# Patient Record
Sex: FEMALE | Race: WHITE | NOT HISPANIC OR LATINO
[De-identification: names, ages, dates, MRNs, and addresses within clinical notes are randomized per-mention and may not be internally consistent; named-entity substitution may affect disease eponyms.]

---

## 2017-01-20 ENCOUNTER — APPOINTMENT (OUTPATIENT)
Dept: OBGYN | Facility: CLINIC | Age: 67
End: 2017-01-20

## 2018-02-15 ENCOUNTER — APPOINTMENT (OUTPATIENT)
Dept: OBGYN | Facility: CLINIC | Age: 68
End: 2018-02-15
Payer: MEDICARE

## 2018-02-15 VITALS
WEIGHT: 158 LBS | DIASTOLIC BLOOD PRESSURE: 93 MMHG | HEART RATE: 94 BPM | SYSTOLIC BLOOD PRESSURE: 164 MMHG | HEIGHT: 64 IN | BODY MASS INDEX: 26.98 KG/M2

## 2018-02-15 DIAGNOSIS — Z01.419 ENCOUNTER FOR GYNECOLOGICAL EXAMINATION (GENERAL) (ROUTINE) W/OUT ABNORMAL FINDINGS: ICD-10-CM

## 2018-02-15 DIAGNOSIS — H91.93 UNSPECIFIED HEARING LOSS, BILATERAL: ICD-10-CM

## 2018-02-15 DIAGNOSIS — Z87.19 PERSONAL HISTORY OF OTHER DISEASES OF THE DIGESTIVE SYSTEM: ICD-10-CM

## 2018-02-15 DIAGNOSIS — Z80.0 FAMILY HISTORY OF MALIGNANT NEOPLASM OF DIGESTIVE ORGANS: ICD-10-CM

## 2018-02-15 DIAGNOSIS — Z87.39 PERSONAL HISTORY OF OTHER DISEASES OF THE MUSCULOSKELETAL SYSTEM AND CONNECTIVE TISSUE: ICD-10-CM

## 2018-02-15 DIAGNOSIS — F41.8 OTHER SPECIFIED ANXIETY DISORDERS: ICD-10-CM

## 2018-02-15 DIAGNOSIS — Z80.3 FAMILY HISTORY OF MALIGNANT NEOPLASM OF BREAST: ICD-10-CM

## 2018-02-15 DIAGNOSIS — F11.90 OPIOID USE, UNSPECIFIED, UNCOMPLICATED: ICD-10-CM

## 2018-02-15 DIAGNOSIS — Z86.79 PERSONAL HISTORY OF OTHER DISEASES OF THE CIRCULATORY SYSTEM: ICD-10-CM

## 2018-02-15 DIAGNOSIS — Z85.3 PERSONAL HISTORY OF MALIGNANT NEOPLASM OF BREAST: ICD-10-CM

## 2018-02-15 DIAGNOSIS — Z87.09 PERSONAL HISTORY OF OTHER DISEASES OF THE RESPIRATORY SYSTEM: ICD-10-CM

## 2018-02-15 PROCEDURE — G0101: CPT

## 2018-02-15 PROCEDURE — 99213 OFFICE O/P EST LOW 20 MIN: CPT | Mod: 25

## 2018-02-20 LAB — CORE LAB FLUID CYTOLOGY: NORMAL

## 2018-02-22 LAB — CYTOLOGY CVX/VAG DOC THIN PREP: NORMAL

## 2018-03-12 ENCOUNTER — APPOINTMENT (OUTPATIENT)
Dept: SURGERY | Facility: CLINIC | Age: 68
End: 2018-03-12
Payer: MEDICARE

## 2018-03-12 VITALS
SYSTOLIC BLOOD PRESSURE: 189 MMHG | HEART RATE: 70 BPM | BODY MASS INDEX: 27.31 KG/M2 | DIASTOLIC BLOOD PRESSURE: 79 MMHG | HEIGHT: 64 IN | TEMPERATURE: 98 F | WEIGHT: 160 LBS | OXYGEN SATURATION: 96 %

## 2018-03-12 DIAGNOSIS — Z80.3 FAMILY HISTORY OF MALIGNANT NEOPLASM OF BREAST: ICD-10-CM

## 2018-03-12 DIAGNOSIS — Z87.898 PERSONAL HISTORY OF OTHER SPECIFIED CONDITIONS: ICD-10-CM

## 2018-03-12 PROCEDURE — 99204 OFFICE O/P NEW MOD 45 MIN: CPT

## 2018-03-12 RX ORDER — OMEPRAZOLE MAGNESIUM 20.6 MG/1
20.6 (20 BASE) CAPSULE, DELAYED RELEASE ORAL
Refills: 0 | Status: ACTIVE | COMMUNITY

## 2018-03-12 RX ORDER — OXYCODONE AND ACETAMINOPHEN 10; 325 MG/1; MG/1
10-325 TABLET ORAL
Refills: 0 | Status: ACTIVE | COMMUNITY

## 2018-03-15 ENCOUNTER — OUTPATIENT (OUTPATIENT)
Dept: OUTPATIENT SERVICES | Facility: HOSPITAL | Age: 68
LOS: 1 days | End: 2018-03-15
Payer: MEDICARE

## 2018-03-15 ENCOUNTER — APPOINTMENT (OUTPATIENT)
Dept: ULTRASOUND IMAGING | Facility: CLINIC | Age: 68
End: 2018-03-15
Payer: MEDICARE

## 2018-03-15 ENCOUNTER — APPOINTMENT (OUTPATIENT)
Dept: MAMMOGRAPHY | Facility: CLINIC | Age: 68
End: 2018-03-15
Payer: MEDICARE

## 2018-03-15 DIAGNOSIS — Z00.8 ENCOUNTER FOR OTHER GENERAL EXAMINATION: ICD-10-CM

## 2018-03-15 PROCEDURE — 76641 ULTRASOUND BREAST COMPLETE: CPT | Mod: 26,50

## 2018-03-15 PROCEDURE — G0279: CPT

## 2018-03-15 PROCEDURE — 76641 ULTRASOUND BREAST COMPLETE: CPT

## 2018-03-15 PROCEDURE — G0279: CPT | Mod: 26

## 2018-03-15 PROCEDURE — 77066 DX MAMMO INCL CAD BI: CPT

## 2018-03-15 PROCEDURE — 77066 DX MAMMO INCL CAD BI: CPT | Mod: 26

## 2018-03-20 ENCOUNTER — APPOINTMENT (OUTPATIENT)
Dept: MRI IMAGING | Facility: CLINIC | Age: 68
End: 2018-03-20
Payer: MEDICARE

## 2018-03-20 ENCOUNTER — OUTPATIENT (OUTPATIENT)
Dept: OUTPATIENT SERVICES | Facility: HOSPITAL | Age: 68
LOS: 1 days | End: 2018-03-20
Payer: MEDICARE

## 2018-03-20 ENCOUNTER — APPOINTMENT (OUTPATIENT)
Dept: ULTRASOUND IMAGING | Facility: CLINIC | Age: 68
End: 2018-03-20
Payer: MEDICARE

## 2018-03-20 ENCOUNTER — RESULT REVIEW (OUTPATIENT)
Age: 68
End: 2018-03-20

## 2018-03-20 DIAGNOSIS — N64.52 NIPPLE DISCHARGE: ICD-10-CM

## 2018-03-20 DIAGNOSIS — Z00.8 ENCOUNTER FOR OTHER GENERAL EXAMINATION: ICD-10-CM

## 2018-03-20 PROCEDURE — 77065 DX MAMMO INCL CAD UNI: CPT | Mod: 26,LT

## 2018-03-20 PROCEDURE — 19083 BX BREAST 1ST LESION US IMAG: CPT | Mod: LT

## 2018-03-20 PROCEDURE — 77065 DX MAMMO INCL CAD UNI: CPT

## 2018-03-20 PROCEDURE — 19083 BX BREAST 1ST LESION US IMAG: CPT

## 2018-03-20 PROCEDURE — A4648: CPT

## 2018-03-23 ENCOUNTER — APPOINTMENT (OUTPATIENT)
Dept: SURGERY | Facility: CLINIC | Age: 68
End: 2018-03-23
Payer: MEDICARE

## 2018-03-23 VITALS — BODY MASS INDEX: 27.31 KG/M2 | HEIGHT: 64 IN | TEMPERATURE: 98 F | WEIGHT: 160 LBS

## 2018-03-23 VITALS — SYSTOLIC BLOOD PRESSURE: 121 MMHG | DIASTOLIC BLOOD PRESSURE: 75 MMHG | OXYGEN SATURATION: 93 % | HEART RATE: 75 BPM

## 2018-03-23 DIAGNOSIS — N64.52 NIPPLE DISCHARGE: ICD-10-CM

## 2018-03-23 DIAGNOSIS — G47.9 SLEEP DISORDER, UNSPECIFIED: ICD-10-CM

## 2018-03-23 PROCEDURE — 99215 OFFICE O/P EST HI 40 MIN: CPT

## 2018-03-23 RX ORDER — ZOLPIDEM TARTRATE 10 MG/1
10 TABLET ORAL
Qty: 30 | Refills: 0 | Status: DISCONTINUED | COMMUNITY
Start: 2018-01-11 | End: 2018-03-23

## 2018-03-23 RX ORDER — ZOLPIDEM TARTRATE 10 MG/1
10 TABLET, FILM COATED ORAL
Refills: 0 | Status: DISCONTINUED | COMMUNITY
End: 2018-03-23

## 2018-03-26 ENCOUNTER — OTHER (OUTPATIENT)
Age: 68
End: 2018-03-26

## 2018-03-28 PROBLEM — N64.52 DISCHARGE FROM LEFT NIPPLE: Status: ACTIVE | Noted: 2018-03-12

## 2018-04-02 ENCOUNTER — APPOINTMENT (OUTPATIENT)
Dept: SURGERY | Facility: CLINIC | Age: 68
End: 2018-04-02

## 2018-04-07 ENCOUNTER — OUTPATIENT (OUTPATIENT)
Dept: OUTPATIENT SERVICES | Facility: HOSPITAL | Age: 68
LOS: 1 days | Discharge: HOME | End: 2018-04-07

## 2018-04-07 DIAGNOSIS — C50.919 MALIGNANT NEOPLASM OF UNSPECIFIED SITE OF UNSPECIFIED FEMALE BREAST: ICD-10-CM

## 2018-04-11 ENCOUNTER — OUTPATIENT (OUTPATIENT)
Dept: OUTPATIENT SERVICES | Facility: HOSPITAL | Age: 68
LOS: 1 days | Discharge: HOME | End: 2018-04-11

## 2018-04-11 ENCOUNTER — APPOINTMENT (OUTPATIENT)
Dept: PLASTIC SURGERY | Facility: CLINIC | Age: 68
End: 2018-04-11

## 2018-04-11 DIAGNOSIS — C50.919 MALIGNANT NEOPLASM OF UNSPECIFIED SITE OF UNSPECIFIED FEMALE BREAST: ICD-10-CM

## 2018-04-16 ENCOUNTER — APPOINTMENT (OUTPATIENT)
Dept: HEMATOLOGY ONCOLOGY | Facility: CLINIC | Age: 68
End: 2018-04-16
Payer: MEDICARE

## 2018-04-16 ENCOUNTER — APPOINTMENT (OUTPATIENT)
Dept: SURGERY | Facility: CLINIC | Age: 68
End: 2018-04-16
Payer: MEDICARE

## 2018-04-16 VITALS
HEART RATE: 79 BPM | OXYGEN SATURATION: 94 % | BODY MASS INDEX: 28.44 KG/M2 | HEIGHT: 64 IN | DIASTOLIC BLOOD PRESSURE: 83 MMHG | SYSTOLIC BLOOD PRESSURE: 181 MMHG | WEIGHT: 166.56 LBS | TEMPERATURE: 97.6 F

## 2018-04-16 VITALS
HEART RATE: 65 BPM | DIASTOLIC BLOOD PRESSURE: 87 MMHG | WEIGHT: 166 LBS | BODY MASS INDEX: 28.34 KG/M2 | TEMPERATURE: 98.5 F | SYSTOLIC BLOOD PRESSURE: 143 MMHG | OXYGEN SATURATION: 96 % | HEIGHT: 64 IN

## 2018-04-16 DIAGNOSIS — N64.52 NIPPLE DISCHARGE: ICD-10-CM

## 2018-04-16 DIAGNOSIS — J44.9 CHRONIC OBSTRUCTIVE PULMONARY DISEASE, UNSPECIFIED: ICD-10-CM

## 2018-04-16 DIAGNOSIS — R92.8 OTHER ABNORMAL AND INCONCLUSIVE FINDINGS ON DIAGNOSTIC IMAGING OF BREAST: ICD-10-CM

## 2018-04-16 PROCEDURE — 99215 OFFICE O/P EST HI 40 MIN: CPT

## 2018-04-16 PROCEDURE — 99205 OFFICE O/P NEW HI 60 MIN: CPT

## 2018-04-16 RX ORDER — VALSARTAN AND HYDROCHLOROTHIAZIDE 160; 12.5 MG/1; MG/1
160-12.5 TABLET, FILM COATED ORAL
Refills: 0 | Status: DISCONTINUED | COMMUNITY
End: 2018-04-16

## 2018-04-18 ENCOUNTER — APPOINTMENT (OUTPATIENT)
Dept: MRI IMAGING | Facility: CLINIC | Age: 68
End: 2018-04-18
Payer: MEDICARE

## 2018-04-18 ENCOUNTER — OUTPATIENT (OUTPATIENT)
Dept: OUTPATIENT SERVICES | Facility: HOSPITAL | Age: 68
LOS: 1 days | End: 2018-04-18
Payer: MEDICARE

## 2018-04-18 DIAGNOSIS — N64.52 NIPPLE DISCHARGE: ICD-10-CM

## 2018-04-18 PROCEDURE — 77059 MRI BREAST BILATERAL: CPT | Mod: 26

## 2018-04-18 PROCEDURE — C8937: CPT

## 2018-04-18 PROCEDURE — C8908: CPT

## 2018-04-18 PROCEDURE — A9585: CPT

## 2018-04-18 PROCEDURE — 0159T: CPT | Mod: 26

## 2018-04-18 PROCEDURE — 82565 ASSAY OF CREATININE: CPT

## 2018-04-23 ENCOUNTER — APPOINTMENT (OUTPATIENT)
Dept: PHYSICAL MEDICINE AND REHAB | Facility: CLINIC | Age: 68
End: 2018-04-23
Payer: MEDICARE

## 2018-04-23 VITALS
TEMPERATURE: 97.6 F | HEART RATE: 68 BPM | BODY MASS INDEX: 28.4 KG/M2 | HEIGHT: 64 IN | WEIGHT: 166.34 LBS | SYSTOLIC BLOOD PRESSURE: 150 MMHG | DIASTOLIC BLOOD PRESSURE: 96 MMHG | OXYGEN SATURATION: 96 %

## 2018-04-23 PROCEDURE — 99204 OFFICE O/P NEW MOD 45 MIN: CPT

## 2018-05-02 PROBLEM — R92.8 ABNORMAL FINDING ON BREAST IMAGING: Status: ACTIVE | Noted: 2018-03-16

## 2018-05-08 ENCOUNTER — APPOINTMENT (OUTPATIENT)
Dept: PLASTIC SURGERY | Facility: CLINIC | Age: 68
End: 2018-05-08
Payer: MEDICARE

## 2018-05-08 VITALS
BODY MASS INDEX: 28.17 KG/M2 | DIASTOLIC BLOOD PRESSURE: 90 MMHG | HEIGHT: 64 IN | OXYGEN SATURATION: 99 % | WEIGHT: 165 LBS | TEMPERATURE: 98.2 F | RESPIRATION RATE: 16 BRPM | SYSTOLIC BLOOD PRESSURE: 148 MMHG | HEART RATE: 72 BPM

## 2018-05-08 DIAGNOSIS — Z80.0 FAMILY HISTORY OF MALIGNANT NEOPLASM OF DIGESTIVE ORGANS: ICD-10-CM

## 2018-05-08 DIAGNOSIS — Z80.41 FAMILY HISTORY OF MALIGNANT NEOPLASM OF OVARY: ICD-10-CM

## 2018-05-08 DIAGNOSIS — Z80.3 FAMILY HISTORY OF MALIGNANT NEOPLASM OF BREAST: ICD-10-CM

## 2018-05-08 PROCEDURE — 99205 OFFICE O/P NEW HI 60 MIN: CPT

## 2018-05-10 ENCOUNTER — OTHER (OUTPATIENT)
Age: 68
End: 2018-05-10

## 2018-05-15 ENCOUNTER — OUTPATIENT (OUTPATIENT)
Dept: OUTPATIENT SERVICES | Facility: HOSPITAL | Age: 68
LOS: 1 days | End: 2018-05-15
Payer: MEDICARE

## 2018-05-15 ENCOUNTER — APPOINTMENT (OUTPATIENT)
Dept: MRI IMAGING | Facility: CLINIC | Age: 68
End: 2018-05-15
Payer: MEDICARE

## 2018-05-15 ENCOUNTER — RESULT REVIEW (OUTPATIENT)
Age: 68
End: 2018-05-15

## 2018-05-15 DIAGNOSIS — Z00.8 ENCOUNTER FOR OTHER GENERAL EXAMINATION: ICD-10-CM

## 2018-05-15 PROCEDURE — A4648: CPT

## 2018-05-15 PROCEDURE — 77065 DX MAMMO INCL CAD UNI: CPT

## 2018-05-15 PROCEDURE — A9585: CPT

## 2018-05-15 PROCEDURE — 19085 BX BREAST 1ST LESION MR IMAG: CPT | Mod: RT

## 2018-05-15 PROCEDURE — 77065 DX MAMMO INCL CAD UNI: CPT | Mod: 26,RT

## 2018-05-15 PROCEDURE — 19085 BX BREAST 1ST LESION MR IMAG: CPT

## 2018-05-17 ENCOUNTER — APPOINTMENT (OUTPATIENT)
Dept: SURGERY | Facility: CLINIC | Age: 68
End: 2018-05-17
Payer: MEDICARE

## 2018-05-17 VITALS
OXYGEN SATURATION: 97 % | BODY MASS INDEX: 28.34 KG/M2 | SYSTOLIC BLOOD PRESSURE: 156 MMHG | HEIGHT: 64 IN | DIASTOLIC BLOOD PRESSURE: 93 MMHG | WEIGHT: 166 LBS | HEART RATE: 70 BPM

## 2018-05-17 VITALS — TEMPERATURE: 99.2 F

## 2018-05-17 PROCEDURE — 99215 OFFICE O/P EST HI 40 MIN: CPT

## 2018-05-17 RX ORDER — AMLODIPINE BESYLATE 5 MG/1
5 TABLET ORAL DAILY
Refills: 0 | Status: DISCONTINUED | COMMUNITY
Start: 2018-04-16 | End: 2018-05-17

## 2018-05-21 ENCOUNTER — APPOINTMENT (OUTPATIENT)
Dept: NEPHROLOGY | Facility: CLINIC | Age: 68
End: 2018-05-21

## 2018-06-07 ENCOUNTER — OUTPATIENT (OUTPATIENT)
Dept: OUTPATIENT SERVICES | Facility: HOSPITAL | Age: 68
LOS: 1 days | End: 2018-06-07
Payer: MEDICARE

## 2018-06-07 ENCOUNTER — APPOINTMENT (OUTPATIENT)
Dept: MRI IMAGING | Facility: IMAGING CENTER | Age: 68
End: 2018-06-07
Payer: MEDICARE

## 2018-06-07 DIAGNOSIS — C50.912 MALIGNANT NEOPLASM OF UNSPECIFIED SITE OF LEFT FEMALE BREAST: ICD-10-CM

## 2018-06-07 PROCEDURE — 82565 ASSAY OF CREATININE: CPT

## 2018-06-07 PROCEDURE — C8902: CPT

## 2018-06-07 PROCEDURE — C8920: CPT

## 2018-06-07 PROCEDURE — 74185 MRA ABD W OR W/O CNTRST: CPT | Mod: 26

## 2018-06-07 PROCEDURE — A9585: CPT

## 2018-06-07 PROCEDURE — 72198 MR ANGIO PELVIS W/O & W/DYE: CPT | Mod: 26

## 2018-06-11 ENCOUNTER — OUTPATIENT (OUTPATIENT)
Dept: OUTPATIENT SERVICES | Facility: HOSPITAL | Age: 68
LOS: 1 days | End: 2018-06-11
Payer: MEDICARE

## 2018-06-11 ENCOUNTER — APPOINTMENT (OUTPATIENT)
Dept: SURGERY | Facility: CLINIC | Age: 68
End: 2018-06-11
Payer: MEDICARE

## 2018-06-11 VITALS
DIASTOLIC BLOOD PRESSURE: 56 MMHG | WEIGHT: 155.21 LBS | HEIGHT: 64 IN | HEART RATE: 61 BPM | RESPIRATION RATE: 16 BRPM | TEMPERATURE: 97 F | SYSTOLIC BLOOD PRESSURE: 81 MMHG

## 2018-06-11 VITALS
OXYGEN SATURATION: 97 % | WEIGHT: 153.88 LBS | BODY MASS INDEX: 26.27 KG/M2 | HEIGHT: 64 IN | HEART RATE: 67 BPM | TEMPERATURE: 97.9 F

## 2018-06-11 DIAGNOSIS — Z29.9 ENCOUNTER FOR PROPHYLACTIC MEASURES, UNSPECIFIED: ICD-10-CM

## 2018-06-11 DIAGNOSIS — Z01.818 ENCOUNTER FOR OTHER PREPROCEDURAL EXAMINATION: ICD-10-CM

## 2018-06-11 DIAGNOSIS — Z15.01 GENETIC SUSCEPTIBILITY TO MALIGNANT NEOPLASM OF BREAST: ICD-10-CM

## 2018-06-11 DIAGNOSIS — Z98.890 OTHER SPECIFIED POSTPROCEDURAL STATES: Chronic | ICD-10-CM

## 2018-06-11 DIAGNOSIS — M54.9 DORSALGIA, UNSPECIFIED: ICD-10-CM

## 2018-06-11 DIAGNOSIS — J44.9 CHRONIC OBSTRUCTIVE PULMONARY DISEASE, UNSPECIFIED: ICD-10-CM

## 2018-06-11 DIAGNOSIS — C50.912 MALIGNANT NEOPLASM OF UNSPECIFIED SITE OF LEFT FEMALE BREAST: ICD-10-CM

## 2018-06-11 DIAGNOSIS — I10 ESSENTIAL (PRIMARY) HYPERTENSION: ICD-10-CM

## 2018-06-11 DIAGNOSIS — J38.1 POLYP OF VOCAL CORD AND LARYNX: Chronic | ICD-10-CM

## 2018-06-11 LAB
ALBUMIN SERPL ELPH-MCNC: 4.1 G/DL — SIGNIFICANT CHANGE UP (ref 3.3–5.2)
ALP SERPL-CCNC: 90 U/L — SIGNIFICANT CHANGE UP (ref 40–120)
ALT FLD-CCNC: 19 U/L — SIGNIFICANT CHANGE UP
ANION GAP SERPL CALC-SCNC: 16 MMOL/L — SIGNIFICANT CHANGE UP (ref 5–17)
APTT BLD: 30.7 SEC — SIGNIFICANT CHANGE UP (ref 27.5–37.4)
AST SERPL-CCNC: 22 U/L — SIGNIFICANT CHANGE UP
BASOPHILS # BLD AUTO: 0 K/UL — SIGNIFICANT CHANGE UP (ref 0–0.2)
BASOPHILS NFR BLD AUTO: 0.4 % — SIGNIFICANT CHANGE UP (ref 0–2)
BILIRUB SERPL-MCNC: 0.2 MG/DL — LOW (ref 0.4–2)
BLD GP AB SCN SERPL QL: SIGNIFICANT CHANGE UP
BUN SERPL-MCNC: 26 MG/DL — HIGH (ref 8–20)
CALCIUM SERPL-MCNC: 9.3 MG/DL — SIGNIFICANT CHANGE UP (ref 8.6–10.2)
CALCIUM SERPL-MCNC: 9.5 MG/DL — SIGNIFICANT CHANGE UP (ref 8.6–10.2)
CHLORIDE SERPL-SCNC: 97 MMOL/L — LOW (ref 98–107)
CO2 SERPL-SCNC: 26 MMOL/L — SIGNIFICANT CHANGE UP (ref 22–29)
CREAT SERPL-MCNC: 1.43 MG/DL — HIGH (ref 0.5–1.3)
EOSINOPHIL # BLD AUTO: 0 K/UL — SIGNIFICANT CHANGE UP (ref 0–0.5)
EOSINOPHIL NFR BLD AUTO: 0.9 % — SIGNIFICANT CHANGE UP (ref 0–6)
GLUCOSE SERPL-MCNC: 111 MG/DL — SIGNIFICANT CHANGE UP (ref 70–115)
HBA1C BLD-MCNC: 5.4 % — SIGNIFICANT CHANGE UP (ref 4–5.6)
HCT VFR BLD CALC: 39.6 % — SIGNIFICANT CHANGE UP (ref 37–47)
HGB BLD-MCNC: 13.3 G/DL — SIGNIFICANT CHANGE UP (ref 12–16)
INR BLD: 1.11 RATIO — SIGNIFICANT CHANGE UP (ref 0.88–1.16)
LYMPHOCYTES # BLD AUTO: 1.5 K/UL — SIGNIFICANT CHANGE UP (ref 1–4.8)
LYMPHOCYTES # BLD AUTO: 27.6 % — SIGNIFICANT CHANGE UP (ref 20–55)
MAGNESIUM SERPL-MCNC: 1.8 MG/DL — SIGNIFICANT CHANGE UP (ref 1.6–2.6)
MCHC RBC-ENTMCNC: 28.5 PG — SIGNIFICANT CHANGE UP (ref 27–31)
MCHC RBC-ENTMCNC: 33.6 G/DL — SIGNIFICANT CHANGE UP (ref 32–36)
MCV RBC AUTO: 85 FL — SIGNIFICANT CHANGE UP (ref 81–99)
MONOCYTES # BLD AUTO: 0.3 K/UL — SIGNIFICANT CHANGE UP (ref 0–0.8)
MONOCYTES NFR BLD AUTO: 5.4 % — SIGNIFICANT CHANGE UP (ref 3–10)
NEUTROPHILS # BLD AUTO: 3.5 K/UL — SIGNIFICANT CHANGE UP (ref 1.8–8)
NEUTROPHILS NFR BLD AUTO: 65.5 % — SIGNIFICANT CHANGE UP (ref 37–73)
PHOSPHATE SERPL-MCNC: 3.7 MG/DL — SIGNIFICANT CHANGE UP (ref 2.4–4.7)
PLATELET # BLD AUTO: 290 K/UL — SIGNIFICANT CHANGE UP (ref 150–400)
POTASSIUM SERPL-MCNC: 3 MMOL/L — LOW (ref 3.5–5.3)
POTASSIUM SERPL-SCNC: 3 MMOL/L — LOW (ref 3.5–5.3)
PROT SERPL-MCNC: 7.2 G/DL — SIGNIFICANT CHANGE UP (ref 6.6–8.7)
PROTHROM AB SERPL-ACNC: 12.2 SEC — SIGNIFICANT CHANGE UP (ref 9.8–12.7)
RBC # BLD: 4.66 M/UL — SIGNIFICANT CHANGE UP (ref 4.4–5.2)
RBC # FLD: 13.7 % — SIGNIFICANT CHANGE UP (ref 11–15.6)
SODIUM SERPL-SCNC: 139 MMOL/L — SIGNIFICANT CHANGE UP (ref 135–145)
TYPE + AB SCN PNL BLD: SIGNIFICANT CHANGE UP
WBC # BLD: 5.4 K/UL — SIGNIFICANT CHANGE UP (ref 4.8–10.8)
WBC # FLD AUTO: 5.4 K/UL — SIGNIFICANT CHANGE UP (ref 4.8–10.8)

## 2018-06-11 PROCEDURE — 83036 HEMOGLOBIN GLYCOSYLATED A1C: CPT

## 2018-06-11 PROCEDURE — 85027 COMPLETE CBC AUTOMATED: CPT

## 2018-06-11 PROCEDURE — 85730 THROMBOPLASTIN TIME PARTIAL: CPT

## 2018-06-11 PROCEDURE — 99215 OFFICE O/P EST HI 40 MIN: CPT

## 2018-06-11 PROCEDURE — 86900 BLOOD TYPING SEROLOGIC ABO: CPT

## 2018-06-11 PROCEDURE — 36415 COLL VENOUS BLD VENIPUNCTURE: CPT

## 2018-06-11 PROCEDURE — 86304 IMMUNOASSAY TUMOR CA 125: CPT

## 2018-06-11 PROCEDURE — 86850 RBC ANTIBODY SCREEN: CPT

## 2018-06-11 PROCEDURE — G0463: CPT

## 2018-06-11 PROCEDURE — 82310 ASSAY OF CALCIUM: CPT

## 2018-06-11 PROCEDURE — 85610 PROTHROMBIN TIME: CPT

## 2018-06-11 PROCEDURE — 86901 BLOOD TYPING SEROLOGIC RH(D): CPT

## 2018-06-11 PROCEDURE — 71046 X-RAY EXAM CHEST 2 VIEWS: CPT | Mod: 26

## 2018-06-11 PROCEDURE — 80053 COMPREHEN METABOLIC PANEL: CPT

## 2018-06-11 PROCEDURE — 83735 ASSAY OF MAGNESIUM: CPT

## 2018-06-11 PROCEDURE — 84100 ASSAY OF PHOSPHORUS: CPT

## 2018-06-11 PROCEDURE — 71046 X-RAY EXAM CHEST 2 VIEWS: CPT

## 2018-06-11 PROCEDURE — 93010 ELECTROCARDIOGRAM REPORT: CPT

## 2018-06-11 PROCEDURE — 93005 ELECTROCARDIOGRAM TRACING: CPT

## 2018-06-11 RX ORDER — SODIUM CHLORIDE 9 MG/ML
3 INJECTION INTRAMUSCULAR; INTRAVENOUS; SUBCUTANEOUS EVERY 8 HOURS
Qty: 0 | Refills: 0 | Status: DISCONTINUED | OUTPATIENT
Start: 2018-06-18 | End: 2018-06-18

## 2018-06-11 RX ORDER — CEFOTETAN DISODIUM 1 G
2 VIAL (EA) INJECTION ONCE
Qty: 0 | Refills: 0 | Status: COMPLETED | OUTPATIENT
Start: 2018-06-18 | End: 2018-06-18

## 2018-06-11 NOTE — PATIENT PROFILE ADULT. - PRO PAIN LIFE ADAPT
inability or reluctance to perform ADLs/inability to sleep/inability to concentrate/decreased activity level/decreased appetite

## 2018-06-11 NOTE — H&P PST ADULT - ASSESSMENT
medications reviewed, instructions given on what medications to take and what not to take. Asked the patient to take the Blood pressure medication/ heart medication on DOP.   CAPRINI SCORE [CLOT]    AGE RELATED RISK FACTORS                                                       MOBILITY RELATED FACTORS  [ ] Age 41-60 years                                            (1 Point)                  [ ] Bed rest                                                        (1 Point)  [ ] Age: 61-74 years                                           (2 Points)                 [ ] Plaster cast                                                   (2 Points)  [ ] Age= 75 years                                              (3 Points)                 [ ] Bed bound for more than 72 hours                 (2 Points)    DISEASE RELATED RISK FACTORS                                               GENDER SPECIFIC FACTORS  [ ] Edema in the lower extremities                       (1 Point)                  [ ] Pregnancy                                                     (1 Point)  [ ] Varicose veins                                               (1 Point)                  [ ] Post-partum < 6 weeks                                   (1 Point)             [ ] BMI > 25 Kg/m2                                            (1 Point)                  [ ] Hormonal therapy  or oral contraception          (1 Point)                 [ ] Sepsis (in the previous month)                        (1 Point)                  [ ] History of pregnancy complications                 (1 point)  [ ] Pneumonia or serious lung disease                                               [ ] Unexplained or recurrent                     (1 Point)           (in the previous month)                               (1 Point)  [ ] Abnormal pulmonary function test                     (1 Point)                 SURGERY RELATED RISK FACTORS  [ ] Acute myocardial infarction                              (1 Point)                 [ ]  Section                                             (1 Point)  [ ] Congestive heart failure (in the previous month)  (1 Point)               [ ] Minor surgery                                                  (1 Point)   [ ] Inflammatory bowel disease                             (1 Point)                 [ ] Arthroscopic surgery                                        (2 Points)  [ ] Central venous access                                      (2 Points)                [ ] General surgery lasting more than 45 minutes   (2 Points)       [ ] Stroke (in the previous month)                          (5 Points)               [ ] Elective arthroplasty                                         (5 Points)                                                                                                                                               HEMATOLOGY RELATED FACTORS                                                 TRAUMA RELATED RISK FACTORS  [ ] Prior episodes of VTE                                     (3 Points)                 [ ] Fracture of the hip, pelvis, or leg                       (5 Points)  [ ] Positive family history for VTE                         (3 Points)                 [ ] Acute spinal cord injury (in the previous month)  (5 Points)  [ ] Prothrombin 12702 A                                     (3 Points)                 [ ] Paralysis  (less than 1 month)                             (5 Points)  [ ] Factor V Leiden                                             (3 Points)                  [ ] Multiple Trauma within 1 month                        (5 Points)  [ ] Lupus anticoagulants                                     (3 Points)                                                           [ ] Anticardiolipin antibodies                               (3 Points)                                                       [ ] High homocysteine in the blood                      (3 Points)                                             [ ] Other congenital or acquired thrombophilia      (3 Points)                                                [ ] Heparin induced thrombocytopenia                  (3 Points)                                          Total Score [          ] medications reviewed, instructions given on what medications to take and what not to take. Asked the patient to take the Blood pressure medication/ heart medication on DOP.   CAPRINI SCORE [CLOT]    AGE RELATED RISK FACTORS                                                       MOBILITY RELATED FACTORS  [ ] Age 41-60 years                                            (1 Point)                  [ ] Bed rest                                                        (1 Point)  [x ] Age: 61-74 years                                           (2 Points)                 [ ] Plaster cast                                                   (2 Points)  [ ] Age= 75 years                                              (3 Points)                 [ ] Bed bound for more than 72 hours                 (2 Points)    DISEASE RELATED RISK FACTORS                                               GENDER SPECIFIC FACTORS  [ ] Edema in the lower extremities                       (1 Point)                  [ ] Pregnancy                                                     (1 Point)  [ ] Varicose veins                                               (1 Point)                  [ ] Post-partum < 6 weeks                                   (1 Point)             [x ] BMI > 25 Kg/m2                                            (1 Point)                  [ ] Hormonal therapy  or oral contraception          (1 Point)                 [ ] Sepsis (in the previous month)                        (1 Point)                  [ ] History of pregnancy complications                 (1 point)  [ ] Pneumonia or serious lung disease                                               [ ] Unexplained or recurrent                     (1 Point)           (in the previous month)                               (1 Point)  [ ] Abnormal pulmonary function test                     (1 Point)                 SURGERY RELATED RISK FACTORS  [ ] Acute myocardial infarction                              (1 Point)                 [ ]  Section                                             (1 Point)  [ ] Congestive heart failure (in the previous month)  (1 Point)               [ ] Minor surgery                                                  (1 Point)   [ ] Inflammatory bowel disease                             (1 Point)                 [ ] Arthroscopic surgery                                        (2 Points)  [ ] Central venous access                                      (2 Points)                [x ] General surgery lasting more than 45 minutes   (2 Points)       [ ] Stroke (in the previous month)                          (5 Points)               [ ] Elective arthroplasty                                         (5 Points)                                                                                                                                               HEMATOLOGY RELATED FACTORS                                                 TRAUMA RELATED RISK FACTORS  [ ] Prior episodes of VTE                                     (3 Points)                 [ ] Fracture of the hip, pelvis, or leg                       (5 Points)  [ ] Positive family history for VTE                         (3 Points)                 [ ] Acute spinal cord injury (in the previous month)  (5 Points)  [ ] Prothrombin 43691 A                                     (3 Points)                 [ ] Paralysis  (less than 1 month)                             (5 Points)  [ ] Factor V Leiden                                             (3 Points)                  [ ] Multiple Trauma within 1 month                        (5 Points)  [ ] Lupus anticoagulants                                     (3 Points)                                                           [ ] Anticardiolipin antibodies                               (3 Points)                                                       [ ] High homocysteine in the blood                      (3 Points)                                             [ ] Other congenital or acquired thrombophilia      (3 Points)                                                [ ] Heparin induced thrombocytopenia                  (3 Points)                                          Total Score [ 5         ]

## 2018-06-11 NOTE — H&P PST ADULT - PMH
Back pain  Chronic  COPD (chronic obstructive pulmonary disease)    GERD (gastroesophageal reflux disease)    Hypertension

## 2018-06-11 NOTE — H&P PST ADULT - HISTORY OF PRESENT ILLNESS
67 year old female 67 year old female who had a history of breast cancer in 2005, S/P chemo and radiation states she started having drainage from her left breast in Feb 2017 and she had a mammo gram and sonogram which was negative and the drainage stopped later but it started again in Dec. 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 67 year old female who had a history of breast cancer in 2005, S/P chemo and radiation states she started having drainage from her left breast in Feb 2017 and she had a mammo gram and sonogram which was negative and the drainage stopped later but it started again in Dec. 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

## 2018-06-11 NOTE — PATIENT PROFILE ADULT. - ABILITY TO HEAR (WITH HEARING AID OR HEARING APPLIANCE IF NORMALLY USED):
bilat hearing loss/Mildly to Moderately Impaired: difficulty hearing in some environments or speaker may need to increase volume or speak distinctly

## 2018-06-11 NOTE — PATIENT PROFILE ADULT. - LEARNING ASSESSMENT (PATIENT) ADDITIONAL COMMENTS
Metoprolol, Valsartan_HCTZ, spiriva, symbicort, ventolin rescue, Med clearance - Dr Blackmon 6/12, Cardiac clear with  Dr Toth & Pulmonary clear with Dr Gatica Metoprolol, Valsartan_HCTZ, spiriva, symbicort, ventolin rescue, Med clearance - Dr Blackmon 6/12, Cardiac clear with  Dr Toth & Pulmonary clear with Dr Gatica H/O TMJ Instructed pt on pre-op instructions, tips for safer surgery, pain management scale, pre-surgical infection prevention instructions, take Metoprolol & Valsartan-HCTZ with a sip of water & Spiriva, Symbicort the morning of surgery, bring ventolin rescue inhaler, Med clearance  with  Dr Blackmon on 6/12/18, Cardiac clearance with Dr Toth & Pulmonary clearance with Dr Gatica verbalized understanding of all.

## 2018-06-11 NOTE — H&P PST ADULT - ATTENDING COMMENTS
Patient understands that left sentinel lymph node biopsy may fail depending on previous surgery/healing however attempt will be made for left sentinel lymph node biopsy possible axillary dissection.  She understands that secondary to bilateral breast cancer, she will undergo bilateral mastectomy with bilateral sentinel lymph node biopsy possible axillary dissection. Risks v benefits were discussed. Technical aspects of procedure were reviewed. Patient understands that left sentinel lymph node biopsy may fail depending on previous surgery/healing (records indicate that she underwent a sentinel lymph node biopsy in 2005) however attempt will be made for left sentinel lymph node biopsy possible axillary dissection.  She understands that secondary to bilateral breast cancer, she will undergo bilateral mastectomy with bilateral sentinel lymph node biopsy possible axillary dissection. Risks v benefits were discussed. Technical aspects of procedure were reviewed.

## 2018-06-11 NOTE — H&P PST ADULT - OTHER CARE PROVIDERS
will see Dr. Toth for cardiac clearance pending , will see Dr. Gatica for Pulmonary clearance pending

## 2018-06-11 NOTE — H&P PST ADULT - PROBLEM SELECTOR PLAN 1
Bilateral mastectomy bilateral lymphoscintigraphy, bilateral breast reconstruction with deep inferior epigastric  flaps. Medical, pulmonary and cardiac clearance pending

## 2018-06-11 NOTE — H&P PST ADULT - FAMILY HISTORY
Mother  Still living? No  Family history of breast cancer in female, Age at diagnosis: Age Unknown     Father  Still living? No  Family history of throat cancer, Age at diagnosis: Age Unknown

## 2018-06-12 LAB — CANCER AG125 SERPL-ACNC: 18 U/ML — SIGNIFICANT CHANGE UP

## 2018-06-17 ENCOUNTER — TRANSCRIPTION ENCOUNTER (OUTPATIENT)
Age: 68
End: 2018-06-17

## 2018-06-18 ENCOUNTER — APPOINTMENT (OUTPATIENT)
Dept: PLASTIC SURGERY | Facility: HOSPITAL | Age: 68
End: 2018-06-18
Payer: MEDICARE

## 2018-06-18 ENCOUNTER — RESULT REVIEW (OUTPATIENT)
Age: 68
End: 2018-06-18

## 2018-06-18 ENCOUNTER — APPOINTMENT (OUTPATIENT)
Dept: SURGERY | Facility: HOSPITAL | Age: 68
End: 2018-06-18

## 2018-06-18 ENCOUNTER — INPATIENT (INPATIENT)
Facility: HOSPITAL | Age: 68
LOS: 3 days | Discharge: HOME | DRG: 571 | End: 2018-06-22
Attending: SURGERY | Admitting: SURGERY
Payer: MEDICARE

## 2018-06-18 VITALS
HEIGHT: 64 IN | OXYGEN SATURATION: 99 % | HEART RATE: 78 BPM | RESPIRATION RATE: 16 BRPM | SYSTOLIC BLOOD PRESSURE: 122 MMHG | WEIGHT: 155.21 LBS | TEMPERATURE: 98 F | DIASTOLIC BLOOD PRESSURE: 66 MMHG

## 2018-06-18 DIAGNOSIS — J38.1 POLYP OF VOCAL CORD AND LARYNX: Chronic | ICD-10-CM

## 2018-06-18 DIAGNOSIS — Z98.890 OTHER SPECIFIED POSTPROCEDURAL STATES: Chronic | ICD-10-CM

## 2018-06-18 DIAGNOSIS — C50.912 MALIGNANT NEOPLASM OF UNSPECIFIED SITE OF LEFT FEMALE BREAST: ICD-10-CM

## 2018-06-18 LAB
ANION GAP SERPL CALC-SCNC: 15 MMOL/L — SIGNIFICANT CHANGE UP (ref 5–17)
BUN SERPL-MCNC: 10 MG/DL — SIGNIFICANT CHANGE UP (ref 8–20)
CALCIUM SERPL-MCNC: 7.8 MG/DL — LOW (ref 8.6–10.2)
CHLORIDE SERPL-SCNC: 99 MMOL/L — SIGNIFICANT CHANGE UP (ref 98–107)
CO2 SERPL-SCNC: 24 MMOL/L — SIGNIFICANT CHANGE UP (ref 22–29)
CREAT SERPL-MCNC: 1.01 MG/DL — SIGNIFICANT CHANGE UP (ref 0.5–1.3)
GLUCOSE BLDC GLUCOMTR-MCNC: 112 MG/DL — HIGH (ref 70–99)
GLUCOSE BLDC GLUCOMTR-MCNC: 123 MG/DL — HIGH (ref 70–99)
GLUCOSE BLDC GLUCOMTR-MCNC: 97 MG/DL — SIGNIFICANT CHANGE UP (ref 70–99)
GLUCOSE SERPL-MCNC: 127 MG/DL — HIGH (ref 70–115)
HCT VFR BLD CALC: 33.2 % — LOW (ref 37–47)
HGB BLD-MCNC: 11.2 G/DL — LOW (ref 12–16)
MAGNESIUM SERPL-MCNC: 1.8 MG/DL — SIGNIFICANT CHANGE UP (ref 1.6–2.6)
MCHC RBC-ENTMCNC: 29.2 PG — SIGNIFICANT CHANGE UP (ref 27–31)
MCHC RBC-ENTMCNC: 33.7 G/DL — SIGNIFICANT CHANGE UP (ref 32–36)
MCV RBC AUTO: 86.7 FL — SIGNIFICANT CHANGE UP (ref 81–99)
PHOSPHATE SERPL-MCNC: 4.1 MG/DL — SIGNIFICANT CHANGE UP (ref 2.4–4.7)
PLATELET # BLD AUTO: 198 K/UL — SIGNIFICANT CHANGE UP (ref 150–400)
POTASSIUM SERPL-MCNC: 4 MMOL/L — SIGNIFICANT CHANGE UP (ref 3.5–5.3)
POTASSIUM SERPL-MCNC: 4.5 MMOL/L — SIGNIFICANT CHANGE UP (ref 3.5–5.3)
POTASSIUM SERPL-SCNC: 4 MMOL/L — SIGNIFICANT CHANGE UP (ref 3.5–5.3)
POTASSIUM SERPL-SCNC: 4.5 MMOL/L — SIGNIFICANT CHANGE UP (ref 3.5–5.3)
RBC # BLD: 3.83 M/UL — LOW (ref 4.4–5.2)
RBC # FLD: 13.8 % — SIGNIFICANT CHANGE UP (ref 11–15.6)
SODIUM SERPL-SCNC: 138 MMOL/L — SIGNIFICANT CHANGE UP (ref 135–145)
WBC # BLD: 10.3 K/UL — SIGNIFICANT CHANGE UP (ref 4.8–10.8)
WBC # FLD AUTO: 10.3 K/UL — SIGNIFICANT CHANGE UP (ref 4.8–10.8)

## 2018-06-18 PROCEDURE — XXXXX: CPT

## 2018-06-18 PROCEDURE — 19364 BRST RCNSTJ FREE FLAP: CPT

## 2018-06-18 PROCEDURE — 38792 RA TRACER ID OF SENTINL NODE: CPT | Mod: 50

## 2018-06-18 PROCEDURE — 88309 TISSUE EXAM BY PATHOLOGIST: CPT | Mod: 26

## 2018-06-18 PROCEDURE — 88307 TISSUE EXAM BY PATHOLOGIST: CPT | Mod: 26

## 2018-06-18 PROCEDURE — 88333 PATH CONSLTJ SURG CYTO XM 1: CPT | Mod: 26

## 2018-06-18 PROCEDURE — 38530 BIOPSY/REMOVAL LYMPH NODES: CPT | Mod: RT

## 2018-06-18 PROCEDURE — 88305 TISSUE EXAM BY PATHOLOGIST: CPT | Mod: 26

## 2018-06-18 PROCEDURE — 19303 MAST SIMPLE COMPLETE: CPT | Mod: 50

## 2018-06-18 PROCEDURE — 38525 BIOPSY/REMOVAL LYMPH NODES: CPT | Mod: 50

## 2018-06-18 PROCEDURE — 19303 MAST SIMPLE COMPLETE: CPT | Mod: AS,50

## 2018-06-18 RX ORDER — MIDAZOLAM HYDROCHLORIDE 1 MG/ML
1 INJECTION, SOLUTION INTRAMUSCULAR; INTRAVENOUS
Qty: 0 | Refills: 0 | Status: DISCONTINUED | OUTPATIENT
Start: 2018-06-18 | End: 2018-06-19

## 2018-06-18 RX ORDER — BUPIVACAINE 13.3 MG/ML
20 INJECTION, SUSPENSION, LIPOSOMAL INFILTRATION ONCE
Qty: 0 | Refills: 0 | Status: DISCONTINUED | OUTPATIENT
Start: 2018-06-18 | End: 2018-06-18

## 2018-06-18 RX ORDER — HEPARIN SODIUM 5000 [USP'U]/ML
5000 INJECTION INTRAVENOUS; SUBCUTANEOUS EVERY 8 HOURS
Qty: 0 | Refills: 0 | Status: DISCONTINUED | OUTPATIENT
Start: 2018-06-18 | End: 2018-06-22

## 2018-06-18 RX ORDER — ACETAMINOPHEN 500 MG
1000 TABLET ORAL ONCE
Qty: 0 | Refills: 0 | Status: COMPLETED | OUTPATIENT
Start: 2018-06-19 | End: 2018-06-19

## 2018-06-18 RX ORDER — HEPARIN SODIUM 5000 [USP'U]/ML
5000 INJECTION INTRAVENOUS; SUBCUTANEOUS EVERY 8 HOURS
Qty: 0 | Refills: 0 | Status: DISCONTINUED | OUTPATIENT
Start: 2018-06-18 | End: 2018-06-18

## 2018-06-18 RX ORDER — HYDROMORPHONE HYDROCHLORIDE 2 MG/ML
0.5 INJECTION INTRAMUSCULAR; INTRAVENOUS; SUBCUTANEOUS
Qty: 0 | Refills: 0 | Status: DISCONTINUED | OUTPATIENT
Start: 2018-06-18 | End: 2018-06-18

## 2018-06-18 RX ORDER — SODIUM CHLORIDE 9 MG/ML
1000 INJECTION, SOLUTION INTRAVENOUS
Qty: 0 | Refills: 0 | Status: DISCONTINUED | OUTPATIENT
Start: 2018-06-18 | End: 2018-06-19

## 2018-06-18 RX ORDER — SODIUM CHLORIDE 9 MG/ML
1000 INJECTION, SOLUTION INTRAVENOUS ONCE
Qty: 0 | Refills: 0 | Status: COMPLETED | OUTPATIENT
Start: 2018-06-18 | End: 2018-06-19

## 2018-06-18 RX ORDER — HYDROMORPHONE HYDROCHLORIDE 2 MG/ML
1 INJECTION INTRAMUSCULAR; INTRAVENOUS; SUBCUTANEOUS
Qty: 0 | Refills: 0 | Status: DISCONTINUED | OUTPATIENT
Start: 2018-06-18 | End: 2018-06-19

## 2018-06-18 RX ORDER — ONDANSETRON 8 MG/1
4 TABLET, FILM COATED ORAL ONCE
Qty: 0 | Refills: 0 | Status: DISCONTINUED | OUTPATIENT
Start: 2018-06-18 | End: 2018-06-19

## 2018-06-18 RX ORDER — ACETAMINOPHEN 500 MG
1000 TABLET ORAL ONCE
Qty: 0 | Refills: 0 | Status: COMPLETED | OUTPATIENT
Start: 2018-06-18 | End: 2018-06-18

## 2018-06-18 RX ADMIN — HYDROMORPHONE HYDROCHLORIDE 0.5 MILLIGRAM(S): 2 INJECTION INTRAMUSCULAR; INTRAVENOUS; SUBCUTANEOUS at 21:15

## 2018-06-18 RX ADMIN — HYDROMORPHONE HYDROCHLORIDE 0.5 MILLIGRAM(S): 2 INJECTION INTRAMUSCULAR; INTRAVENOUS; SUBCUTANEOUS at 19:51

## 2018-06-18 RX ADMIN — HYDROMORPHONE HYDROCHLORIDE 0.5 MILLIGRAM(S): 2 INJECTION INTRAMUSCULAR; INTRAVENOUS; SUBCUTANEOUS at 19:38

## 2018-06-18 RX ADMIN — HYDROMORPHONE HYDROCHLORIDE 0.5 MILLIGRAM(S): 2 INJECTION INTRAMUSCULAR; INTRAVENOUS; SUBCUTANEOUS at 19:25

## 2018-06-18 RX ADMIN — HYDROMORPHONE HYDROCHLORIDE 0.5 MILLIGRAM(S): 2 INJECTION INTRAMUSCULAR; INTRAVENOUS; SUBCUTANEOUS at 19:35

## 2018-06-18 RX ADMIN — HYDROMORPHONE HYDROCHLORIDE 0.5 MILLIGRAM(S): 2 INJECTION INTRAMUSCULAR; INTRAVENOUS; SUBCUTANEOUS at 21:28

## 2018-06-18 RX ADMIN — HYDROMORPHONE HYDROCHLORIDE 0.5 MILLIGRAM(S): 2 INJECTION INTRAMUSCULAR; INTRAVENOUS; SUBCUTANEOUS at 20:00

## 2018-06-18 RX ADMIN — HYDROMORPHONE HYDROCHLORIDE 0.5 MILLIGRAM(S): 2 INJECTION INTRAMUSCULAR; INTRAVENOUS; SUBCUTANEOUS at 21:06

## 2018-06-18 RX ADMIN — MIDAZOLAM HYDROCHLORIDE 1 MILLIGRAM(S): 1 INJECTION, SOLUTION INTRAMUSCULAR; INTRAVENOUS at 20:00

## 2018-06-18 RX ADMIN — HYDROMORPHONE HYDROCHLORIDE 0.5 MILLIGRAM(S): 2 INJECTION INTRAMUSCULAR; INTRAVENOUS; SUBCUTANEOUS at 20:56

## 2018-06-18 RX ADMIN — HYDROMORPHONE HYDROCHLORIDE 0.5 MILLIGRAM(S): 2 INJECTION INTRAMUSCULAR; INTRAVENOUS; SUBCUTANEOUS at 19:15

## 2018-06-18 RX ADMIN — HEPARIN SODIUM 5000 UNIT(S): 5000 INJECTION INTRAVENOUS; SUBCUTANEOUS at 23:58

## 2018-06-18 RX ADMIN — SODIUM CHLORIDE 100 MILLILITER(S): 9 INJECTION, SOLUTION INTRAVENOUS at 19:05

## 2018-06-18 RX ADMIN — HYDROMORPHONE HYDROCHLORIDE 0.5 MILLIGRAM(S): 2 INJECTION INTRAMUSCULAR; INTRAVENOUS; SUBCUTANEOUS at 21:51

## 2018-06-18 RX ADMIN — Medication 100 GRAM(S): at 08:12

## 2018-06-18 RX ADMIN — HYDROMORPHONE HYDROCHLORIDE 0.5 MILLIGRAM(S): 2 INJECTION INTRAMUSCULAR; INTRAVENOUS; SUBCUTANEOUS at 19:48

## 2018-06-18 RX ADMIN — HYDROMORPHONE HYDROCHLORIDE 0.5 MILLIGRAM(S): 2 INJECTION INTRAMUSCULAR; INTRAVENOUS; SUBCUTANEOUS at 22:05

## 2018-06-18 RX ADMIN — HYDROMORPHONE HYDROCHLORIDE 1 MILLIGRAM(S): 2 INJECTION INTRAMUSCULAR; INTRAVENOUS; SUBCUTANEOUS at 23:43

## 2018-06-18 RX ADMIN — MIDAZOLAM HYDROCHLORIDE 1 MILLIGRAM(S): 1 INJECTION, SOLUTION INTRAMUSCULAR; INTRAVENOUS at 20:31

## 2018-06-18 RX ADMIN — HYDROMORPHONE HYDROCHLORIDE 0.5 MILLIGRAM(S): 2 INJECTION INTRAMUSCULAR; INTRAVENOUS; SUBCUTANEOUS at 21:45

## 2018-06-18 RX ADMIN — Medication 400 MILLIGRAM(S): at 18:30

## 2018-06-18 NOTE — PROGRESS NOTE ADULT - SUBJECTIVE AND OBJECTIVE BOX
Post-op Check    Subjective:  Pt states that pain is mostly controlled on current regiment. Denies palpitations, SOB, n/v.     STATUS POST:  b/l mastectomy, b/l breast reconstruction with RIO flaps, CORA BSO    POST OPERATIVE DAY #: 0    MEDICATIONS  (STANDING):  heparin  Injectable 5000 Unit(s) SubCutaneous every 8 hours  lactated ringers Bolus 1000 milliLiter(s) IV Bolus once  lactated ringers. 1000 milliLiter(s) (100 mL/Hr) IV Continuous <Continuous>    MEDICATIONS  (PRN):  HYDROmorphone  Injectable 1 milliGRAM(s) IV Push every 3 hours PRN Moderate Pain (4 - 6)  midazolam Injectable 1 milliGRAM(s) IV Push every 30 minutes PRN anxiety  ondansetron Injectable 4 milliGRAM(s) IV Push once PRN Nausea and/or Vomiting      Vital Signs Last 24 Hrs  T(C): 37.7 (18 Jun 2018 23:00), Max: 37.8 (18 Jun 2018 19:02)  T(F): 99.8 (18 Jun 2018 23:00), Max: 100 (18 Jun 2018 19:02)  HR: 87 (18 Jun 2018 23:30) (78 - 97)  BP: 127/64 (18 Jun 2018 22:30) (122/66 - 175/98)  BP(mean): 76 (18 Jun 2018 21:30) (76 - 119)  RR: 12 (18 Jun 2018 23:30) (12 - 26)  SpO2: 97% (18 Jun 2018 23:30) (97% - 100%)    Physical Exam:    Constitutional: NAD  HEENT: PERRL, EOMI  Neck: No JVD, FROM without pain  Respiratory: no accessory muscle use  Chest wall: Tissue oximetry in place b/l, surgical dressings in place with some SS discharge L>R, drains in place  GI: soft, TTP across lower abdomen, surgical site well approximated. Drains in place with SS output.  Neurological: A&O x 3; without gross deficit    A: 67F doing well s/p mastectomy and breast reconstruction with RIO flaps    P:  Continue current care in PACU  Pain control  OOB as tolerated  Encourage IS  DVT ppx

## 2018-06-19 ENCOUNTER — RESULT REVIEW (OUTPATIENT)
Age: 68
End: 2018-06-19

## 2018-06-19 DIAGNOSIS — Z15.01 GENETIC SUSCEPTIBILITY TO MALIGNANT NEOPLASM OF BREAST: ICD-10-CM

## 2018-06-19 DIAGNOSIS — C50.919 MALIGNANT NEOPLASM OF UNSPECIFIED SITE OF UNSPECIFIED FEMALE BREAST: ICD-10-CM

## 2018-06-19 DIAGNOSIS — L76.82 OTHER POSTPROCEDURAL COMPLICATIONS OF SKIN AND SUBCUTANEOUS TISSUE: ICD-10-CM

## 2018-06-19 DIAGNOSIS — M54.9 DORSALGIA, UNSPECIFIED: ICD-10-CM

## 2018-06-19 LAB
ANION GAP SERPL CALC-SCNC: 12 MMOL/L — SIGNIFICANT CHANGE UP (ref 5–17)
BUN SERPL-MCNC: 13 MG/DL — SIGNIFICANT CHANGE UP (ref 8–20)
CALCIUM SERPL-MCNC: 8.2 MG/DL — LOW (ref 8.6–10.2)
CHLORIDE SERPL-SCNC: 101 MMOL/L — SIGNIFICANT CHANGE UP (ref 98–107)
CO2 SERPL-SCNC: 24 MMOL/L — SIGNIFICANT CHANGE UP (ref 22–29)
CREAT SERPL-MCNC: 1.14 MG/DL — SIGNIFICANT CHANGE UP (ref 0.5–1.3)
GLUCOSE SERPL-MCNC: 150 MG/DL — HIGH (ref 70–115)
HCT VFR BLD CALC: 32 % — LOW (ref 37–47)
HGB BLD-MCNC: 10.3 G/DL — LOW (ref 12–16)
MAGNESIUM SERPL-MCNC: 2 MG/DL — SIGNIFICANT CHANGE UP (ref 1.6–2.6)
MCHC RBC-ENTMCNC: 28.1 PG — SIGNIFICANT CHANGE UP (ref 27–31)
MCHC RBC-ENTMCNC: 32.2 G/DL — SIGNIFICANT CHANGE UP (ref 32–36)
MCV RBC AUTO: 87.4 FL — SIGNIFICANT CHANGE UP (ref 81–99)
PHOSPHATE SERPL-MCNC: 4.1 MG/DL — SIGNIFICANT CHANGE UP (ref 2.4–4.7)
PLATELET # BLD AUTO: 187 K/UL — SIGNIFICANT CHANGE UP (ref 150–400)
POTASSIUM SERPL-MCNC: 4 MMOL/L — SIGNIFICANT CHANGE UP (ref 3.5–5.3)
POTASSIUM SERPL-SCNC: 4 MMOL/L — SIGNIFICANT CHANGE UP (ref 3.5–5.3)
RBC # BLD: 3.66 M/UL — LOW (ref 4.4–5.2)
RBC # FLD: 13.8 % — SIGNIFICANT CHANGE UP (ref 11–15.6)
SODIUM SERPL-SCNC: 137 MMOL/L — SIGNIFICANT CHANGE UP (ref 135–145)
WBC # BLD: 10.8 K/UL — SIGNIFICANT CHANGE UP (ref 4.8–10.8)
WBC # FLD AUTO: 10.8 K/UL — SIGNIFICANT CHANGE UP (ref 4.8–10.8)

## 2018-06-19 PROCEDURE — 88112 CYTOPATH CELL ENHANCE TECH: CPT | Mod: 26

## 2018-06-19 PROCEDURE — 99223 1ST HOSP IP/OBS HIGH 75: CPT

## 2018-06-19 RX ORDER — HYDROMORPHONE HYDROCHLORIDE 2 MG/ML
1 INJECTION INTRAMUSCULAR; INTRAVENOUS; SUBCUTANEOUS ONCE
Qty: 0 | Refills: 0 | Status: DISCONTINUED | OUTPATIENT
Start: 2018-06-19 | End: 2018-06-19

## 2018-06-19 RX ORDER — MORPHINE SULFATE 50 MG/1
2 CAPSULE, EXTENDED RELEASE ORAL
Qty: 0 | Refills: 0 | Status: DISCONTINUED | OUTPATIENT
Start: 2018-06-19 | End: 2018-06-19

## 2018-06-19 RX ORDER — NALOXONE HYDROCHLORIDE 4 MG/.1ML
0.1 SPRAY NASAL
Qty: 0 | Refills: 0 | Status: DISCONTINUED | OUTPATIENT
Start: 2018-06-19 | End: 2018-06-22

## 2018-06-19 RX ORDER — HYDROMORPHONE HYDROCHLORIDE 2 MG/ML
30 INJECTION INTRAMUSCULAR; INTRAVENOUS; SUBCUTANEOUS
Qty: 0 | Refills: 0 | Status: DISCONTINUED | OUTPATIENT
Start: 2018-06-19 | End: 2018-06-19

## 2018-06-19 RX ORDER — HYDROMORPHONE HYDROCHLORIDE 2 MG/ML
0.5 INJECTION INTRAMUSCULAR; INTRAVENOUS; SUBCUTANEOUS ONCE
Qty: 0 | Refills: 0 | Status: DISCONTINUED | OUTPATIENT
Start: 2018-06-19 | End: 2018-06-19

## 2018-06-19 RX ORDER — HYDROMORPHONE HYDROCHLORIDE 2 MG/ML
30 INJECTION INTRAMUSCULAR; INTRAVENOUS; SUBCUTANEOUS
Qty: 0 | Refills: 0 | Status: DISCONTINUED | OUTPATIENT
Start: 2018-06-19 | End: 2018-06-21

## 2018-06-19 RX ORDER — HYDROMORPHONE HYDROCHLORIDE 2 MG/ML
0.5 INJECTION INTRAMUSCULAR; INTRAVENOUS; SUBCUTANEOUS
Qty: 0 | Refills: 0 | Status: DISCONTINUED | OUTPATIENT
Start: 2018-06-19 | End: 2018-06-19

## 2018-06-19 RX ORDER — OXYCODONE HYDROCHLORIDE 5 MG/1
10 TABLET ORAL EVERY 6 HOURS
Qty: 0 | Refills: 0 | Status: DISCONTINUED | OUTPATIENT
Start: 2018-06-19 | End: 2018-06-19

## 2018-06-19 RX ORDER — PANTOPRAZOLE SODIUM 20 MG/1
40 TABLET, DELAYED RELEASE ORAL
Qty: 0 | Refills: 0 | Status: DISCONTINUED | OUTPATIENT
Start: 2018-06-19 | End: 2018-06-22

## 2018-06-19 RX ORDER — OXYCODONE HYDROCHLORIDE 5 MG/1
10 TABLET ORAL EVERY 8 HOURS
Qty: 0 | Refills: 0 | Status: DISCONTINUED | OUTPATIENT
Start: 2018-06-19 | End: 2018-06-20

## 2018-06-19 RX ORDER — OXYCODONE AND ACETAMINOPHEN 5; 325 MG/1; MG/1
1 TABLET ORAL EVERY 4 HOURS
Qty: 0 | Refills: 0 | Status: DISCONTINUED | OUTPATIENT
Start: 2018-06-19 | End: 2018-06-19

## 2018-06-19 RX ORDER — OXYCODONE HYDROCHLORIDE 5 MG/1
20 TABLET ORAL EVERY 12 HOURS
Qty: 0 | Refills: 0 | Status: DISCONTINUED | OUTPATIENT
Start: 2018-06-20 | End: 2018-06-20

## 2018-06-19 RX ORDER — SODIUM CHLORIDE 9 MG/ML
1000 INJECTION, SOLUTION INTRAVENOUS
Qty: 0 | Refills: 0 | Status: DISCONTINUED | OUTPATIENT
Start: 2018-06-19 | End: 2018-06-22

## 2018-06-19 RX ORDER — DIPHENHYDRAMINE HCL 50 MG
25 CAPSULE ORAL EVERY 6 HOURS
Qty: 0 | Refills: 0 | Status: DISCONTINUED | OUTPATIENT
Start: 2018-06-19 | End: 2018-06-22

## 2018-06-19 RX ORDER — DULOXETINE HYDROCHLORIDE 30 MG/1
60 CAPSULE, DELAYED RELEASE ORAL DAILY
Qty: 0 | Refills: 0 | Status: DISCONTINUED | OUTPATIENT
Start: 2018-06-19 | End: 2018-06-22

## 2018-06-19 RX ORDER — METOPROLOL TARTRATE 50 MG
100 TABLET ORAL DAILY
Qty: 0 | Refills: 0 | Status: DISCONTINUED | OUTPATIENT
Start: 2018-06-19 | End: 2018-06-22

## 2018-06-19 RX ORDER — OXYCODONE HYDROCHLORIDE 5 MG/1
10 TABLET ORAL EVERY 4 HOURS
Qty: 0 | Refills: 0 | Status: DISCONTINUED | OUTPATIENT
Start: 2018-06-19 | End: 2018-06-19

## 2018-06-19 RX ORDER — GABAPENTIN 400 MG/1
300 CAPSULE ORAL AT BEDTIME
Qty: 0 | Refills: 0 | Status: DISCONTINUED | OUTPATIENT
Start: 2018-06-19 | End: 2018-06-19

## 2018-06-19 RX ORDER — OXYCODONE AND ACETAMINOPHEN 5; 325 MG/1; MG/1
2 TABLET ORAL EVERY 4 HOURS
Qty: 0 | Refills: 0 | Status: DISCONTINUED | OUTPATIENT
Start: 2018-06-19 | End: 2018-06-19

## 2018-06-19 RX ORDER — TIOTROPIUM BROMIDE 18 UG/1
1 CAPSULE ORAL; RESPIRATORY (INHALATION) DAILY
Qty: 0 | Refills: 0 | Status: DISCONTINUED | OUTPATIENT
Start: 2018-06-19 | End: 2018-06-22

## 2018-06-19 RX ORDER — ONDANSETRON 8 MG/1
4 TABLET, FILM COATED ORAL EVERY 6 HOURS
Qty: 0 | Refills: 0 | Status: DISCONTINUED | OUTPATIENT
Start: 2018-06-19 | End: 2018-06-22

## 2018-06-19 RX ORDER — VALSARTAN 80 MG/1
160 TABLET ORAL DAILY
Qty: 0 | Refills: 0 | Status: DISCONTINUED | OUTPATIENT
Start: 2018-06-19 | End: 2018-06-22

## 2018-06-19 RX ORDER — ACETAMINOPHEN 500 MG
1000 TABLET ORAL ONCE
Qty: 0 | Refills: 0 | Status: COMPLETED | OUTPATIENT
Start: 2018-06-19 | End: 2018-06-19

## 2018-06-19 RX ORDER — ALPRAZOLAM 0.25 MG
1 TABLET ORAL AT BEDTIME
Qty: 0 | Refills: 0 | Status: DISCONTINUED | OUTPATIENT
Start: 2018-06-19 | End: 2018-06-22

## 2018-06-19 RX ORDER — KETOROLAC TROMETHAMINE 30 MG/ML
15 SYRINGE (ML) INJECTION ONCE
Qty: 0 | Refills: 0 | Status: DISCONTINUED | OUTPATIENT
Start: 2018-06-19 | End: 2018-06-19

## 2018-06-19 RX ORDER — ALPRAZOLAM 0.25 MG
0.5 TABLET ORAL THREE TIMES A DAY
Qty: 0 | Refills: 0 | Status: DISCONTINUED | OUTPATIENT
Start: 2018-06-19 | End: 2018-06-22

## 2018-06-19 RX ORDER — ACETAMINOPHEN 500 MG
650 TABLET ORAL EVERY 6 HOURS
Qty: 0 | Refills: 0 | Status: COMPLETED | OUTPATIENT
Start: 2018-06-20 | End: 2018-06-21

## 2018-06-19 RX ORDER — BUDESONIDE AND FORMOTEROL FUMARATE DIHYDRATE 160; 4.5 UG/1; UG/1
2 AEROSOL RESPIRATORY (INHALATION)
Qty: 0 | Refills: 0 | Status: DISCONTINUED | OUTPATIENT
Start: 2018-06-19 | End: 2018-06-22

## 2018-06-19 RX ADMIN — DULOXETINE HYDROCHLORIDE 60 MILLIGRAM(S): 30 CAPSULE, DELAYED RELEASE ORAL at 12:57

## 2018-06-19 RX ADMIN — Medication 400 MILLIGRAM(S): at 16:34

## 2018-06-19 RX ADMIN — HYDROMORPHONE HYDROCHLORIDE 30 MILLILITER(S): 2 INJECTION INTRAMUSCULAR; INTRAVENOUS; SUBCUTANEOUS at 20:35

## 2018-06-19 RX ADMIN — Medication 400 MILLIGRAM(S): at 01:00

## 2018-06-19 RX ADMIN — HYDROMORPHONE HYDROCHLORIDE 30 MILLILITER(S): 2 INJECTION INTRAMUSCULAR; INTRAVENOUS; SUBCUTANEOUS at 21:37

## 2018-06-19 RX ADMIN — HYDROMORPHONE HYDROCHLORIDE 1 MILLIGRAM(S): 2 INJECTION INTRAMUSCULAR; INTRAVENOUS; SUBCUTANEOUS at 02:54

## 2018-06-19 RX ADMIN — OXYCODONE HYDROCHLORIDE 10 MILLIGRAM(S): 5 TABLET ORAL at 16:35

## 2018-06-19 RX ADMIN — HYDROMORPHONE HYDROCHLORIDE 1 MILLIGRAM(S): 2 INJECTION INTRAMUSCULAR; INTRAVENOUS; SUBCUTANEOUS at 05:44

## 2018-06-19 RX ADMIN — Medication 100 MILLIGRAM(S): at 16:52

## 2018-06-19 RX ADMIN — HYDROMORPHONE HYDROCHLORIDE 1 MILLIGRAM(S): 2 INJECTION INTRAMUSCULAR; INTRAVENOUS; SUBCUTANEOUS at 12:58

## 2018-06-19 RX ADMIN — PANTOPRAZOLE SODIUM 40 MILLIGRAM(S): 20 TABLET, DELAYED RELEASE ORAL at 16:53

## 2018-06-19 RX ADMIN — HYDROMORPHONE HYDROCHLORIDE 1 MILLIGRAM(S): 2 INJECTION INTRAMUSCULAR; INTRAVENOUS; SUBCUTANEOUS at 14:25

## 2018-06-19 RX ADMIN — HYDROMORPHONE HYDROCHLORIDE 1 MILLIGRAM(S): 2 INJECTION INTRAMUSCULAR; INTRAVENOUS; SUBCUTANEOUS at 17:09

## 2018-06-19 RX ADMIN — Medication 0.5 MILLIGRAM(S): at 08:34

## 2018-06-19 RX ADMIN — OXYCODONE HYDROCHLORIDE 10 MILLIGRAM(S): 5 TABLET ORAL at 22:45

## 2018-06-19 RX ADMIN — HYDROMORPHONE HYDROCHLORIDE 1 MILLIGRAM(S): 2 INJECTION INTRAMUSCULAR; INTRAVENOUS; SUBCUTANEOUS at 09:04

## 2018-06-19 RX ADMIN — HYDROMORPHONE HYDROCHLORIDE 0.5 MILLIGRAM(S): 2 INJECTION INTRAMUSCULAR; INTRAVENOUS; SUBCUTANEOUS at 22:27

## 2018-06-19 RX ADMIN — OXYCODONE AND ACETAMINOPHEN 2 TABLET(S): 5; 325 TABLET ORAL at 08:00

## 2018-06-19 RX ADMIN — HYDROMORPHONE HYDROCHLORIDE 1 MILLIGRAM(S): 2 INJECTION INTRAMUSCULAR; INTRAVENOUS; SUBCUTANEOUS at 06:00

## 2018-06-19 RX ADMIN — HYDROMORPHONE HYDROCHLORIDE 0.5 MILLIGRAM(S): 2 INJECTION INTRAMUSCULAR; INTRAVENOUS; SUBCUTANEOUS at 22:45

## 2018-06-19 RX ADMIN — VALSARTAN 160 MILLIGRAM(S): 80 TABLET ORAL at 16:53

## 2018-06-19 RX ADMIN — HYDROMORPHONE HYDROCHLORIDE 30 MILLILITER(S): 2 INJECTION INTRAMUSCULAR; INTRAVENOUS; SUBCUTANEOUS at 19:34

## 2018-06-19 RX ADMIN — SODIUM CHLORIDE 1000 MILLILITER(S): 9 INJECTION, SOLUTION INTRAVENOUS at 00:15

## 2018-06-19 RX ADMIN — HEPARIN SODIUM 5000 UNIT(S): 5000 INJECTION INTRAVENOUS; SUBCUTANEOUS at 16:53

## 2018-06-19 RX ADMIN — HYDROMORPHONE HYDROCHLORIDE 1 MILLIGRAM(S): 2 INJECTION INTRAMUSCULAR; INTRAVENOUS; SUBCUTANEOUS at 16:35

## 2018-06-19 RX ADMIN — HEPARIN SODIUM 5000 UNIT(S): 5000 INJECTION INTRAVENOUS; SUBCUTANEOUS at 23:23

## 2018-06-19 RX ADMIN — OXYCODONE HYDROCHLORIDE 10 MILLIGRAM(S): 5 TABLET ORAL at 08:50

## 2018-06-19 RX ADMIN — HYDROMORPHONE HYDROCHLORIDE 1 MILLIGRAM(S): 2 INJECTION INTRAMUSCULAR; INTRAVENOUS; SUBCUTANEOUS at 12:53

## 2018-06-19 RX ADMIN — Medication 15 MILLIGRAM(S): at 08:10

## 2018-06-19 RX ADMIN — HYDROMORPHONE HYDROCHLORIDE 1 MILLIGRAM(S): 2 INJECTION INTRAMUSCULAR; INTRAVENOUS; SUBCUTANEOUS at 14:44

## 2018-06-19 RX ADMIN — Medication 15 MILLIGRAM(S): at 08:50

## 2018-06-19 RX ADMIN — HEPARIN SODIUM 5000 UNIT(S): 5000 INJECTION INTRAVENOUS; SUBCUTANEOUS at 07:02

## 2018-06-19 RX ADMIN — HYDROMORPHONE HYDROCHLORIDE 1 MILLIGRAM(S): 2 INJECTION INTRAMUSCULAR; INTRAVENOUS; SUBCUTANEOUS at 00:00

## 2018-06-19 RX ADMIN — Medication 1000 MILLIGRAM(S): at 16:56

## 2018-06-19 RX ADMIN — HYDROMORPHONE HYDROCHLORIDE 1 MILLIGRAM(S): 2 INJECTION INTRAMUSCULAR; INTRAVENOUS; SUBCUTANEOUS at 03:10

## 2018-06-19 RX ADMIN — OXYCODONE HYDROCHLORIDE 10 MILLIGRAM(S): 5 TABLET ORAL at 17:09

## 2018-06-19 RX ADMIN — OXYCODONE HYDROCHLORIDE 10 MILLIGRAM(S): 5 TABLET ORAL at 14:39

## 2018-06-19 RX ADMIN — BUDESONIDE AND FORMOTEROL FUMARATE DIHYDRATE 2 PUFF(S): 160; 4.5 AEROSOL RESPIRATORY (INHALATION) at 20:20

## 2018-06-19 RX ADMIN — Medication 1000 MILLIGRAM(S): at 01:15

## 2018-06-19 RX ADMIN — Medication 0.5 MILLIGRAM(S): at 14:25

## 2018-06-19 RX ADMIN — OXYCODONE AND ACETAMINOPHEN 2 TABLET(S): 5; 325 TABLET ORAL at 07:02

## 2018-06-19 RX ADMIN — OXYCODONE HYDROCHLORIDE 10 MILLIGRAM(S): 5 TABLET ORAL at 14:44

## 2018-06-19 NOTE — PROGRESS NOTE ADULT - SUBJECTIVE AND OBJECTIVE BOX
GYNECOLOGIC ONCOLOGY PROGRESS NOTE    POD#1      Pt seen and examined at bedside with Dr. Davis.     SUBJECTIVE:    Patient is complaining of uncontrolled pain.   Denies Nausea, Vomiting or Diarrhea.  Denies shortness of breath, chest pain or dyspnea on exertion.      OBJECTIVE:     VITALS:  T(F): 99.8 (06-19-18 @ 07:00), Max: 100 (06-18-18 @ 19:02)  HR: 98 (06-19-18 @ 07:00) (82 - 98)  BP: 126/69 (06-19-18 @ 06:00) (126/62 - 175/98)  RR: 18 (06-19-18 @ 07:00) (10 - 112)  SpO2: 96% (06-19-18 @ 07:00) (95% - 100%)  Wt(kg): --    I&O's Summary    18 Jun 2018 07:01  -  19 Jun 2018 07:00  --------------------------------------------------------  IN: 2235 mL / OUT: 1230 mL / NET: 1005 mL    denney with 685 mL out overnight   Left breast drain 220 mL out overnight  Left abdomen drain 95 mL out overnight  Right breast drain 190 mL out overnight   Right abdomen drain 40 mL out overnight           MEDICATIONS  (STANDING):  heparin  Injectable 5000 Unit(s) SubCutaneous every 8 hours  lactated ringers. 1000 milliLiter(s) (100 mL/Hr) IV Continuous <Continuous>    MEDICATIONS  (PRN):  HYDROmorphone  Injectable 1 milliGRAM(s) IV Push every 3 hours PRN Moderate Pain (4 - 6)  midazolam Injectable 1 milliGRAM(s) IV Push every 30 minutes PRN anxiety  ondansetron Injectable 4 milliGRAM(s) IV Push once PRN Nausea and/or Vomiting  oxyCODONE    5 mG/acetaminophen 325 mG 1 Tablet(s) Oral every 4 hours PRN Moderate Pain (4 - 6)  oxyCODONE    5 mG/acetaminophen 325 mG 2 Tablet(s) Oral every 4 hours PRN Severe Pain (7 - 10)      Physical Exam:  Constitutional: NAD  Pulmonary: clear to auscultation bilaterally   Cardiovascular: Regular rate and rhythm   Abdomen: soft, appropriately tender, no bowel sounds heard  Extremities: no lower extremity edema or calve tenderness, Tashi's sign negative.  Incision: Clean, dry, intact.  Without signs of infection or hernia.      LABS:                        10.3   10.8  )-----------( 187      ( 19 Jun 2018 04:11 )             32.0     06-19    137  |  101  |  13.0  ----------------------------<  150<H>  4.0   |  24.0  |  1.14    Ca    8.2<L>      19 Jun 2018 04:11  Phos  4.1     06-19  Mg     2.0     06-19 GYNECOLOGIC ONCOLOGY PROGRESS NOTE    POD#1      Pt seen and examined at bedside with Dr. Davis.     SUBJECTIVE:    Patient is complaining of pain.   Denies Nausea, Vomiting or Diarrhea.  Denies shortness of breath, chest pain or dyspnea on exertion.      OBJECTIVE:     VITALS:  T(F): 99.8 (06-19-18 @ 07:00), Max: 100 (06-18-18 @ 19:02)  HR: 98 (06-19-18 @ 07:00) (82 - 98)  BP: 126/69 (06-19-18 @ 06:00) (126/62 - 175/98)  RR: 18 (06-19-18 @ 07:00) (10 - 112)  SpO2: 96% (06-19-18 @ 07:00) (95% - 100%)  Wt(kg): --    I&O's Summary    18 Jun 2018 07:01  -  19 Jun 2018 07:00  --------------------------------------------------------  IN: 2235 mL / OUT: 1230 mL / NET: 1005 mL    denney with 685 mL out overnight   Left breast drain 220 mL out overnight  Left abdomen drain 95 mL out overnight  Right breast drain 190 mL out overnight   Right abdomen drain 40 mL out overnight           MEDICATIONS  (STANDING):  heparin  Injectable 5000 Unit(s) SubCutaneous every 8 hours  lactated ringers. 1000 milliLiter(s) (100 mL/Hr) IV Continuous <Continuous>    MEDICATIONS  (PRN):  HYDROmorphone  Injectable 1 milliGRAM(s) IV Push every 3 hours PRN Moderate Pain (4 - 6)  midazolam Injectable 1 milliGRAM(s) IV Push every 30 minutes PRN anxiety  ondansetron Injectable 4 milliGRAM(s) IV Push once PRN Nausea and/or Vomiting  oxyCODONE    5 mG/acetaminophen 325 mG 1 Tablet(s) Oral every 4 hours PRN Moderate Pain (4 - 6)  oxyCODONE    5 mG/acetaminophen 325 mG 2 Tablet(s) Oral every 4 hours PRN Severe Pain (7 - 10)      Physical Exam:  Constitutional: NAD  Pulmonary: clear to auscultation bilaterally   Cardiovascular: Regular rate and rhythm   Abdomen: soft, appropriately tender, no bowel sounds heard  Extremities: no lower extremity edema or calve tenderness, Tashi's sign negative.  Incision: Clean, dry, intact.  Without signs of infection or hernia.      LABS:                        10.3   10.8  )-----------( 187      ( 19 Jun 2018 04:11 )             32.0     06-19    137  |  101  |  13.0  ----------------------------<  150<H>  4.0   |  24.0  |  1.14    Ca    8.2<L>      19 Jun 2018 04:11  Phos  4.1     06-19  Mg     2.0     06-19

## 2018-06-19 NOTE — CONSULT NOTE ADULT - PROBLEM SELECTOR RECOMMENDATION 9
1. Offer Oxycontin 10mg PO now, repeat 8 hours later   - Start Oxycontin 20mg PO BID 06/20/18  2. Offer Dilaudid 1mg IV Push now. Start IV PCA thereafter  3. Offer IV Tylenol 1gram now. Tylenol 650mg PO q6hrs x 2days thereafter  Pain service will follow up on 06/20/19

## 2018-06-19 NOTE — CONSULT NOTE ADULT - ASSESSMENT
67 year old POD 1 s/p CORA BSO, pelvic washings and Mastectomy with breast reconstruction with RIO flaps , POD # 0      Problem/Plan - 1:  ·  Problem: Breast cancer.  Plan: s/p procedure above  Management per primary team   Incentive Spirometer  DVT prophylaxis   OOB as tolerated.      Problem/Plan - 2:  ·  Problem: BRCA1 gene mutation positive.  Plan: S/p CORA BSO and pelvic washings , as per GYN Onc team    Problem / Plan - 3 ; HTN - continue home meds with parameters     Problem / Plan - 4: COPD - stable - continue home meds     Problem / Plan -5: GERD - Protonix added     Problem / Plan -6; Acute postoperative pain on chronic pain - pain not well controlled . Patient does follow up wit pain mgmt . Consider pain mgmt eval . Restart Lyrica / Cymbalta  Problem / Plan - 8: Anxiety - continue Xanax   Problem / Plan - 9: DVT prophylaxis - on Heparin as per primary team                              Opoid induced constipation prophylaxis - consider stool softener/ laxatives - increase ambulation .    Santana cath removed - TOV in progress . 67 year old POD 1 s/p CORA BSO, pelvic washings and Mastectomy with breast reconstruction with RIO flaps .     Problem/Plan - 1:  ·  Problem: Breast cancer.  Plan: s/p procedure above  Management per primary team   Incentive Spirometer  DVT prophylaxis   OOB as tolerated.      Problem/Plan - 2:  ·  Problem: BRCA1 gene mutation positive.  Plan: S/p CORA BSO and pelvic washings , as per GYN Onc team    Problem / Plan - 3 ; HTN - continue home meds with parameters     Problem / Plan - 4: COPD - stable - continue home meds     Problem / Plan -5: GERD - Protonix added     Problem / Plan -6; Acute postoperative pain on chronic pain - pain not well controlled . Patient does follow up wit pain mgmt . Consider pain mgmt eval . Restart Lyrica / Cymbalta  Problem / Plan - 8: Anxiety - continue Xanax   Problem / Plan - 9: DVT prophylaxis - on Heparin as per primary team                              Opoid induced constipation prophylaxis - consider stool softener/ laxatives - increase ambulation .    Santana cath removed - TOV in progress .

## 2018-06-19 NOTE — PROGRESS NOTE ADULT - SUBJECTIVE AND OBJECTIVE BOX
INTERVAL HPI/OVERNIGHT EVENTS/SUBJECTIVE:  POD 1 b/l mastectomy with RIO flap, abdominoplasty, CORA/BSO.  Pt seen and examined in PACU. Pain poorly controlled. No acute events overnight. Good tissue oxygenation of b/l breasts overnight.   Denies n/v/d, cp, sob.     ICU Vital Signs Last 24 Hrs  T(C): 37.8 (19 Jun 2018 08:00), Max: 37.8 (18 Jun 2018 19:02)  T(F): 100 (19 Jun 2018 08:00), Max: 100 (18 Jun 2018 19:02)  HR: 105 (19 Jun 2018 08:00) (82 - 105)  BP: 168/83 (19 Jun 2018 08:00) (126/62 - 175/98)  BP(mean): 83 (19 Jun 2018 06:00) (76 - 119)  ABP: 178/83 (19 Jun 2018 08:00) (126/62 - 190/85)  ABP(mean): 115 (19 Jun 2018 07:00) (78 - 115)  RR: 20 (19 Jun 2018 08:00) (10 - 112)  SpO2: 96% (19 Jun 2018 08:00) (95% - 100%)      I&O's Detail    18 Jun 2018 07:01  -  19 Jun 2018 07:00  --------------------------------------------------------  IN:    IV PiggyBack: 100 mL    Lactated Ringers IV Bolus: 1000 mL    lactated ringers.: 1075 mL    Oral Fluid: 60 mL  Total IN: 2235 mL    OUT:    Drain: 220 mL    Drain: 190 mL    Drain: 40 mL    Drain: 95 mL    Indwelling Catheter - Urethral: 685 mL  Total OUT: 1230 mL    Total NET: 1005 mL      19 Jun 2018 07:01  -  19 Jun 2018 08:47  --------------------------------------------------------  IN:    lactated ringers.: 100 mL  Total IN: 100 mL    OUT:    Indwelling Catheter - Urethral: 35 mL  Total OUT: 35 mL    Total NET: 65 mL      MEDICATIONS  (STANDING):  buDESOnide  80 MICROgram(s)/formoterol 4.5 MICROgram(s) Inhaler 2 Puff(s) Inhalation two times a day  heparin  Injectable 5000 Unit(s) SubCutaneous every 8 hours  lactated ringers. 1000 milliLiter(s) (100 mL/Hr) IV Continuous <Continuous>  metoprolol succinate  milliGRAM(s) Oral daily  tiotropium 18 MICROgram(s) Capsule 1 Capsule(s) Inhalation daily  valsartan 160 milliGRAM(s) Oral daily    MEDICATIONS  (PRN):  ALPRAZolam 0.5 milliGRAM(s) Oral three times a day PRN anxiety  HYDROmorphone  Injectable 1 milliGRAM(s) IV Push every 3 hours PRN Moderate Pain (4 - 6)  morphine  - Injectable 2 milliGRAM(s) IV Push every 3 hours PRN Moderate Pain (4 - 6)  ondansetron Injectable 4 milliGRAM(s) IV Push once PRN Nausea and/or Vomiting  oxyCODONE    IR 10 milliGRAM(s) Oral every 6 hours PRN Moderate Pain (4 - 6)    MISC:     PHYSICAL EXAM:    Gen: patient is in moderate discomfort.     Neurological: aaox3, no loss of sensation.    Neck: supple    Breast: Tissue oximetry in place b/l, surgical dressings in place with some SS discharge L>R, drains in place with serosang output, no ecchymosis no bluish discoloration no signs of tissue compromise.     Pulmonary: non-labored, no accessory muscle use    Cardiovascular: s1/s2    Gastrointestinal: soft, + ttp , non-distended, no guarding or rebound. abdominoplasty surgical incision well approximated, no active drainage or bleeding. drains with serosang output.     Genitourinary: denney     Musculoskeletal: moving all 4 extremities spontaneously      LABS:  CBC Full  -  ( 19 Jun 2018 04:11 )  WBC Count : 10.8 K/uL  Hemoglobin : 10.3 g/dL  Hematocrit : 32.0 %  Platelet Count - Automated : 187 K/uL  Mean Cell Volume : 87.4 fl  Mean Cell Hemoglobin : 28.1 pg  Mean Cell Hemoglobin Concentration : 32.2 g/dL  Auto Neutrophil # : x  Auto Lymphocyte # : x  Auto Monocyte # : x  Auto Eosinophil # : x  Auto Basophil # : x  Auto Neutrophil % : x  Auto Lymphocyte % : x  Auto Monocyte % : x  Auto Eosinophil % : x  Auto Basophil % : x    06-19    137  |  101  |  13.0  ----------------------------<  150<H>  4.0   |  24.0  |  1.14    Ca    8.2<L>      19 Jun 2018 04:11  Phos  4.1     06-19  Mg     2.0     06-19

## 2018-06-19 NOTE — CONSULT NOTE ADULT - SUBJECTIVE AND OBJECTIVE BOX
PMD :Dexter  Cardio : Navneet  Pulmonologist : En     Patient is a 67y old  Female who is s/p   b/l mastectomy with RIO flap, abdominoplasty, CORA/BSO.  Postoperative medical mgmt requested , POD # 1. Patient seen and examined , c/o severe pain at surgical sites , no other complaints .    CC; Breast cancer , s/p   b/l mastectomy with RIO flap, abdominoplasty, CORA/BSO. Pain at surgical sites +     HPI :67 year old female who had a history of breast cancer in 2005, S/P chemo and radiation states she started having drainage from her left breast in Feb 2017 and she had a mammo gram and sonogram which was negative and the drainage stopped later but it started again in Dec. 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       PAST MEDICAL & SURGICAL HISTORY:  GERD (gastroesophageal reflux disease)  Back pain: Chronic  Hypertension  COPD (chronic obstructive pulmonary disease)  [place]   Vocal cord polyp: excision 1999  History of lumpectomy: left 2005      Social History:  Tobacco - ex smoker , quit 13 yrs ago   ETOH - denies   Illicit drug abuse - denies       FAMILY HISTORY:  Family history of throat cancer (Father)  Family history of breast cancer in female (Mother) at 38 yrs old   Family history of DM - type 2 ( Sister )       Allergies    adhesives (Rash)  No Known Drug Allergies    Intolerances    HOME MEDICATIONS :     · 	Toprol- mg oral tablet, extended release: Last Dose Taken:  , 1 tab(s) orally once a day  · 	valsartan 160mg oral tablet: Last Dose Taken:  , 1 tab(s) orally once a day  · 	Symbicort 80 mcg-4.5 mcg/inh inhalation aerosol: Last Dose Taken:  , 2 puff(s) inhaled 2 times a day  · 	Spiriva 18 mcg inhalation capsule: Last Dose Taken:  , 1 cap(s) inhaled once a day  · 	Cymbalta 60 mg oral delayed release capsule: Last Dose Taken:  , 1 cap(s) orally once a day  · 	Xanax 0.5 mg oral tablet: 1 tab(s) orally 3 times a day, As Needed  · 	Lyrica 75 mg oral capsule: Last Dose Taken:  , 1 cap(s) orally 2 times a day, As Needed  · 	oxyCODONE-acetaminophen 10 mg-325 mg oral tablet: Last Dose Taken:  , 1 tab(s) orally every 6 hours  · 	Xanax 1 mg oral tablet: 1 tab(s) orally once a day (at bedtime)  · 	Tagamet 200 mg oral tablet: Last Dose Taken:  , 1 tab(s) orally 2 times a day        REVIEW OF SYSTEMS:    Pain at surgical sites , all other systems reviewed and are negative      MEDICATIONS  (STANDING):  ALPRAZolam 1 milliGRAM(s) Oral at bedtime  buDESOnide  80 MICROgram(s)/formoterol 4.5 MICROgram(s) Inhaler 2 Puff(s) Inhalation two times a day  DULoxetine 60 milliGRAM(s) Oral daily  heparin  Injectable 5000 Unit(s) SubCutaneous every 8 hours  metoprolol succinate  milliGRAM(s) Oral daily  pantoprazole   Suspension 40 milliGRAM(s) Oral before breakfast  pregabalin 75 milliGRAM(s) Oral two times a day  tiotropium 18 MICROgram(s) Capsule 1 Capsule(s) Inhalation daily  valsartan 160 milliGRAM(s) Oral daily    MEDICATIONS  (PRN):  ALPRAZolam 0.5 milliGRAM(s) Oral three times a day PRN anxiety  HYDROmorphone  Injectable 1 milliGRAM(s) IV Push every 3 hours PRN Moderate Pain (4 - 6)  morphine  - Injectable 2 milliGRAM(s) IV Push every 3 hours PRN Moderate Pain (4 - 6)  oxyCODONE    IR 10 milliGRAM(s) Oral every 6 hours PRN Moderate Pain (4 - 6)      Vital Signs Last 24 Hrs  T(C): 37.8 (19 Jun 2018 08:00), Max: 37.8 (18 Jun 2018 19:02)  T(F): 100 (19 Jun 2018 08:00), Max: 100 (18 Jun 2018 19:02)  HR: 94 (19 Jun 2018 09:01) (82 - 105)  BP: 151/84 (19 Jun 2018 09:01) (126/62 - 175/98)  BP(mean): 83 (19 Jun 2018 06:00) (76 - 119)  RR: 13 (19 Jun 2018 09:01) (10 - 112)  SpO2: 95% (19 Jun 2018 09:01) (95% - 100%)      I&O's Detail    18 Jun 2018 07:01  -  19 Jun 2018 07:00  --------------------------------------------------------  IN:    IV PiggyBack: 100 mL    Lactated Ringers IV Bolus: 1000 mL    lactated ringers.: 1075 mL    Oral Fluid: 60 mL  Total IN: 2235 mL    OUT:    Drain: 220 mL    Drain: 190 mL    Drain: 40 mL    Drain: 95 mL    Indwelling Catheter - Urethral: 685 mL  Total OUT: 1230 mL    Total NET: 1005 mL      19 Jun 2018 07:01  -  19 Jun 2018 08:47  --------------------------------------------------------  IN:    lactated ringers.: 100 mL  Total IN: 100 mL    OUT:    Indwelling Catheter - Urethral: 35 mL  Total OUT: 35 mL    Total NET: 65 mL    PHYSICAL EXAM:    GENERAL: NAD, well-groomed, well-developed , [place]   HEAD:  Atraumatic, Normocephalic  EYES: EOMI, PERRLA, conjunctiva and sclera clear  NECK: Supple, No JVD, Normal thyroid  Breast: Tissue oximetry in place b/l, surgical dressings in place with some SS discharge L>R, drains in place with serosang output, no ecchymosis no bluish discoloration no signs of tissue compromise.   NERVOUS SYSTEM:  Alert & Oriented X3, no focal deficit   CHEST/LUNG: CTA  b/l,  no rales, rhonchi, wheezing, or rubs  HEART: Regular rate and rhythm; No murmurs, rubs, or gallops  ABDOMEN: Soft, Nontender, Nondistended; Bowel sounds present, no guarding or rebound ,  abdominoplasty surgical incision well approximated, no active drainage or bleeding. drains with serosang output.   EXTREMITIES:  2+ Peripheral Pulses, No clubbing, cyanosis, or edema ,   LYMPH: No lymphadenopathy noted  SKIN: No rashes or lesions    LABS:                        10.3   10.8  )-----------( 187      ( 19 Jun 2018 04:11 )             32.0     06-19    137  |  101  |  13.0  ----------------------------<  150<H>  4.0   |  24.0  |  1.14    Ca    8.2<L>      19 Jun 2018 04:11  Phos  4.1     06-19  Mg     2.0     06-19      RADIOLOGY & ADDITIONAL STUDIES:  < from: MR Breast w/wo IV Cont, Bilateral w/CAD (04.18.18 @ 14:22) >    EXAM:  MR BREAST WAWIC BI W CAD#        PROCEDURE DATE:  04/18/2018           INTERPRETATION:  CLINICAL INDICATION: 67-year-old female with history of   left lumpectomy for breast cancer in 2005 treated with radiation therapy   and chemotherapy. Newly diagnosed invasive poorly different ductal   carcinoma in the left breast at the 6:00 position, 1 cm from the nipple.   The patient presented with left breast bloody nipple discharge.    TECHNIQUE:  MRI of both breasts was performed using a dedicated breast   coil and 3.0 Zulma closed MRI. Axial non fat-suppressed T1, followed by   dynamic axial T1 fat-suppressed images during and following   administration of gadolinium were obtained.  7.5 cc of Gadavist was   administered intravenously without complication and 0.0 cc were   discarded.  Axial fat suppressed T2 images were also obtained. Post   processing was performed on a Siemens work station including subtraction   and Dynacad analysis of enhancement curves.      PRIOR BREAST MRI:  Noneavailable at this time  CORRELATION STUDIES:Mammogram and breast ultrasound dated 3/15/2018. Left   breast ultrasound-guided core biopsy dated 3/20/2018.    FINDINGS:  The breast parenchyma is composed of scattered fibroglandular   tissue.   The breasts demonstrate moderate background parenchymal   enhancement. This lowers the sensitivity of the breast MRI for detection   of small enhancing lesions.     RIGHT BREAST:  There is a prominent 9 mm enhancing focus at the 12:00 position, middle   depth(series 35399, image 100).  There are scattered enhancing nonspecific foci.    There are no   suspicious enhancing lesions in the right breast.    LEFT BREAST:  Left post surgical changes are present. There is a 1.0 cm irregular mass   at the 6:00 axis and associated clip artifact, corresponding to newly   diagnosed malignancy. There is irregular non mass enhancement extending   anteriorly and superiorly from the mass to the nipple which enhances   asymmetrically, concerning for nipple involvement. Additional irregular   non mass enhancement is noted lateral to the biopsy clip and mass,   measuring up to 2.3 cm in transverse dimension.     AXILLA:  There is no significant lymphadenopathy.    IMPRESSION:    1.0 cm irregular mass at the 6:00 axis of the left breast, corresponding   to newly diagnosed malignancy. There is irregular non mass enhancement   extending lateral to the index carcinoma and superiorly and anteriorly   from the index carcinoma to involve the nipple, concerning for nipple   involvement of disease.    Prominent 9 mm enhancing focus at the 12:00 position of the right breast.   If it would alter management, right breast MR guided core biopsy can be   performed for further evaluation.    RECOMMENDATION:  Biopsy recommended    BI-RADS 4- Suspicious Finding(s)  The patient will  be mailed a written summary of these results in   layman's terms.                       WILLIAM BAH M.D., ATTENDING RADIOLOGIST  This document has been electronically signed. Apr 19 2018  1:55PM        < end of copied text > PMD :Dexter  Cardio : Navneet  Pulmonologist : En   GYN Onc : Dr Seth     Patient is a 67y old  Female who is s/p   b/l mastectomy with RIO flap, abdominoplasty, CORA/BSO.  Postoperative medical mgmt requested , POD # 1. Patient seen and examined , c/o severe pain at surgical sites , no other complaints .    CC; Breast cancer , s/p   b/l mastectomy with RIO flap, abdominoplasty, CORA/BSO. Pain at surgical sites +     HPI :67 year old female who had a history of breast cancer in 2005, S/P chemo and radiation states she started having drainage from her left breast in Feb 2017 and she had a mammo gram and sonogram which was negative and the drainage stopped later but it started again in Dec. 2017 and at that time she had MRI  which showed 1.0 cm irregular mass at the 6:00 axis of the left breast, corresponding to newly diagnosed malignancy and Prominent 9 mm enhancing focus at the 12:00 position of the right breast.       PAST MEDICAL & SURGICAL HISTORY:  L breast cancer  GERD (gastroesophageal reflux disease)  Back pain: Chronic  Hypertension  COPD (chronic obstructive pulmonary disease)  [place]   Vocal cord polyp: excision 1999  History of lumpectomy: left 2005      Social History:  Tobacco - ex smoker , quit 13 yrs ago   ETOH - denies   Illicit drug abuse - denies       FAMILY HISTORY:  Family history of throat cancer (Father)  Family history of breast cancer in female (Mother) at 38 yrs old   Family history of DM - type 2 ( Sister )       Allergies    adhesives (Rash)  No Known Drug Allergies    Intolerances    HOME MEDICATIONS :     · 	Toprol- mg oral tablet, extended release: Last Dose Taken:  , 1 tab(s) orally once a day  · 	valsartan 160mg oral tablet: Last Dose Taken:  , 1 tab(s) orally once a day  · 	Symbicort 80 mcg-4.5 mcg/inh inhalation aerosol: Last Dose Taken:  , 2 puff(s) inhaled 2 times a day  · 	Spiriva 18 mcg inhalation capsule: Last Dose Taken:  , 1 cap(s) inhaled once a day  · 	Cymbalta 60 mg oral delayed release capsule: Last Dose Taken:  , 1 cap(s) orally once a day  · 	Xanax 0.5 mg oral tablet: 1 tab(s) orally 3 times a day, As Needed  · 	Lyrica 75 mg oral capsule: Last Dose Taken:  , 1 cap(s) orally 2 times a day, As Needed  · 	oxyCODONE-acetaminophen 10 mg-325 mg oral tablet: Last Dose Taken:  , 1 tab(s) orally every 6 hours  · 	Xanax 1 mg oral tablet: 1 tab(s) orally once a day (at bedtime)  · 	Tagamet 200 mg oral tablet: Last Dose Taken:  , 1 tab(s) orally 2 times a day        REVIEW OF SYSTEMS:    Pain at surgical sites , all other systems reviewed and are negative      MEDICATIONS  (STANDING):  ALPRAZolam 1 milliGRAM(s) Oral at bedtime  buDESOnide  80 MICROgram(s)/formoterol 4.5 MICROgram(s) Inhaler 2 Puff(s) Inhalation two times a day  DULoxetine 60 milliGRAM(s) Oral daily  heparin  Injectable 5000 Unit(s) SubCutaneous every 8 hours  metoprolol succinate  milliGRAM(s) Oral daily  pantoprazole   Suspension 40 milliGRAM(s) Oral before breakfast  pregabalin 75 milliGRAM(s) Oral two times a day  tiotropium 18 MICROgram(s) Capsule 1 Capsule(s) Inhalation daily  valsartan 160 milliGRAM(s) Oral daily    MEDICATIONS  (PRN):  ALPRAZolam 0.5 milliGRAM(s) Oral three times a day PRN anxiety  HYDROmorphone  Injectable 1 milliGRAM(s) IV Push every 3 hours PRN Moderate Pain (4 - 6)  morphine  - Injectable 2 milliGRAM(s) IV Push every 3 hours PRN Moderate Pain (4 - 6)  oxyCODONE    IR 10 milliGRAM(s) Oral every 6 hours PRN Moderate Pain (4 - 6)      Vital Signs Last 24 Hrs  T(C): 37.8 (19 Jun 2018 08:00), Max: 37.8 (18 Jun 2018 19:02)  T(F): 100 (19 Jun 2018 08:00), Max: 100 (18 Jun 2018 19:02)  HR: 94 (19 Jun 2018 09:01) (82 - 105)  BP: 151/84 (19 Jun 2018 09:01) (126/62 - 175/98)  BP(mean): 83 (19 Jun 2018 06:00) (76 - 119)  RR: 13 (19 Jun 2018 09:01) (10 - 112)  SpO2: 95% (19 Jun 2018 09:01) (95% - 100%)      I&O's Detail    18 Jun 2018 07:01  -  19 Jun 2018 07:00  --------------------------------------------------------  IN:    IV PiggyBack: 100 mL    Lactated Ringers IV Bolus: 1000 mL    lactated ringers.: 1075 mL    Oral Fluid: 60 mL  Total IN: 2235 mL    OUT:    Drain: 220 mL    Drain: 190 mL    Drain: 40 mL    Drain: 95 mL    Indwelling Catheter - Urethral: 685 mL  Total OUT: 1230 mL    Total NET: 1005 mL      19 Jun 2018 07:01  -  19 Jun 2018 08:47  --------------------------------------------------------  IN:    lactated ringers.: 100 mL  Total IN: 100 mL    OUT:    Indwelling Catheter - Urethral: 35 mL  Total OUT: 35 mL    Total NET: 65 mL    PHYSICAL EXAM:    GENERAL: NAD, well-groomed, well-developed , [place]   HEAD:  Atraumatic, Normocephalic  EYES: EOMI, PERRLA, conjunctiva and sclera clear  NECK: Supple, No JVD, Normal thyroid  Breast: Tissue oximetry in place b/l, surgical dressings in place with some SS discharge L>R, drains in place with serosang output, no ecchymosis no bluish discoloration no signs of tissue compromise.   NERVOUS SYSTEM:  Alert & Oriented X3, no focal deficit   CHEST/LUNG: CTA  b/l,  no rales, rhonchi, wheezing, or rubs  HEART: Regular rate and rhythm; No murmurs, rubs, or gallops  ABDOMEN: Soft, Nontender, Nondistended; Bowel sounds present, no guarding or rebound ,  abdominoplasty surgical incision well approximated, no active drainage or bleeding. drains with serosang output.   EXTREMITIES:  2+ Peripheral Pulses, No clubbing, cyanosis, or edema ,   LYMPH: No lymphadenopathy noted  SKIN: No rashes or lesions  [place] cath removed     LABS:                        10.3   10.8  )-----------( 187      ( 19 Jun 2018 04:11 )             32.0     06-19    137  |  101  |  13.0  ----------------------------<  150<H>  4.0   |  24.0  |  1.14    Ca    8.2<L>      19 Jun 2018 04:11  Phos  4.1     06-19  Mg     2.0     06-19      RADIOLOGY & ADDITIONAL STUDIES:  < from: MR Breast w/wo IV Cont, Bilateral w/CAD (04.18.18 @ 14:22) >    EXAM:  MR BREAST WAWIC BI W CAD#        PROCEDURE DATE:  04/18/2018           INTERPRETATION:  CLINICAL INDICATION: 67-year-old female with history of   left lumpectomy for breast cancer in 2005 treated with radiation therapy   and chemotherapy. Newly diagnosed invasive poorly different ductal   carcinoma in the left breast at the 6:00 position, 1 cm from the nipple.   The patient presented with left breast bloody nipple discharge.    TECHNIQUE:  MRI of both breasts was performed using a dedicated breast   coil and 3.0 Zulma closed MRI. Axial non fat-suppressed T1, followed by   dynamic axial T1 fat-suppressed images during and following   administration of gadolinium were obtained.  7.5 cc of Gadavist was   administered intravenously without complication and 0.0 cc were   discarded.  Axial fat suppressed T2 images were also obtained. Post   processing was performed on a Siemens work station including subtraction   and Dynacad analysis of enhancement curves.      PRIOR BREAST MRI:  Noneavailable at this time  CORRELATION STUDIES:Mammogram and breast ultrasound dated 3/15/2018. Left   breast ultrasound-guided core biopsy dated 3/20/2018.    FINDINGS:  The breast parenchyma is composed of scattered fibroglandular   tissue.   The breasts demonstrate moderate background parenchymal   enhancement. This lowers the sensitivity of the breast MRI for detection   of small enhancing lesions.     RIGHT BREAST:  There is a prominent 9 mm enhancing focus at the 12:00 position, middle   depth(series 87883, image 100).  There are scattered enhancing nonspecific foci.    There are no   suspicious enhancing lesions in the right breast.    LEFT BREAST:  Left post surgical changes are present. There is a 1.0 cm irregular mass   at the 6:00 axis and associated clip artifact, corresponding to newly   diagnosed malignancy. There is irregular non mass enhancement extending   anteriorly and superiorly from the mass to the nipple which enhances   asymmetrically, concerning for nipple involvement. Additional irregular   non mass enhancement is noted lateral to the biopsy clip and mass,   measuring up to 2.3 cm in transverse dimension.     AXILLA:  There is no significant lymphadenopathy.    IMPRESSION:    1.0 cm irregular mass at the 6:00 axis of the left breast, corresponding   to newly diagnosed malignancy. There is irregular non mass enhancement   extending lateral to the index carcinoma and superiorly and anteriorly   from the index carcinoma to involve the nipple, concerning for nipple   involvement of disease.    Prominent 9 mm enhancing focus at the 12:00 position of the right breast.   If it would alter management, right breast MR guided core biopsy can be   performed for further evaluation.    RECOMMENDATION:  Biopsy recommended    BI-RADS 4- Suspicious Finding(s)  The patient will  be mailed a written summary of these results in   layman's terms.                       WILLIAM BAH M.D., ATTENDING RADIOLOGIST  This document has been electronically signed. Apr 19 2018  1:55PM        < end of copied text >

## 2018-06-19 NOTE — CONSULT NOTE ADULT - ASSESSMENT
Patient found sitting up in bed, writhing in pain, rocking back and forth as she speaks. Answers in short phrases. Visitors at bedside.      [x]  DREW CIFUENTES Reviewed.  Reference #: [id number]

## 2018-06-19 NOTE — CONSULT NOTE ADULT - CONSULT REASON
s/p  b/l mastectomy with RIO flap, abdominoplasty, CORA/BSO.  Postoperative medical mgmt requested
Chief Complaint: Post-operative pain    HPI: 67 year old female who had a history of breast cancer in 2005, S/P chemo and radiation. Admitted for newly diagnosed malignancy. She is now POD# 1  b/l mastectomy with RIO flap, CORA/BSO, abdominoplasty. Team requests assistance as patient continues to request inadequate analgesia with current regimen.

## 2018-06-19 NOTE — PROGRESS NOTE ADULT - ASSESSMENT
67 year old POD 1 s/p CORA BSO, pelvic washings and Mastectomy with breast reconstruction with RIO flaps.

## 2018-06-19 NOTE — CONSULT NOTE ADULT - SUBJECTIVE AND OBJECTIVE BOX
PAST MEDICAL & SURGICAL HISTORY:  GERD (gastroesophageal reflux disease)  Back pain: Chronic  Hypertension  COPD (chronic obstructive pulmonary disease)  Vocal cord polyp: excision 1999  History of lumpectomy: left 2005      Allergies  adhesives (Rash)  No Known Drug Allergies      VERBAL REPORT:  "I cant take this pain anymore."  Patient verified use of Percocet 4x daily at home. Denies ever requiring more than prescribed. Her primary complaint at this time is post-operative pain.  PAIN SCORE: 10/10        SCALE USED: VNRS (1-10)      PAIN MEDICATIONS:  acetaminophen  IVPB. 1000 milliGRAM(s) IV Intermittent once  ALPRAZolam 0.5 milliGRAM(s) Oral three times a day PRN  ALPRAZolam 1 milliGRAM(s) Oral at bedtime  DULoxetine 60 milliGRAM(s) Oral daily  gabapentin 300 milliGRAM(s) Oral at bedtime  HYDROmorphone  Injectable 1 milliGRAM(s) IV Push once  HYDROmorphone PCA (1 mG/mL) 30 milliLiter(s) PCA Continuous PCA Continuous  HYDROmorphone PCA (1 mG/mL) Rescue Clinician Bolus 0.5 milliGRAM(s) IV Push every 15 minutes PRN  ondansetron Injectable 4 milliGRAM(s) IV Push every 6 hours PRN  oxyCODONE  ER Tablet 10 milliGRAM(s) Oral every 8 hours  pregabalin 75 milliGRAM(s) Oral two times a day    Heme:  heparin  Injectable 5000 Unit(s) SubCutaneous every 8 hours    Cardiovascular:  metoprolol succinate  milliGRAM(s) Oral daily  valsartan 160 milliGRAM(s) Oral daily    GI:  pantoprazole   Suspension 40 milliGRAM(s) Oral before breakfast    All Other Medications:  naloxone Injectable 0.1 milliGRAM(s) IV Push every 3 minutes PRN      REVIEW OF SYSTEMS:  RESPIRATORY: No shortness of breath  GASTROINTESTINAL: Abdominal incision pain. No nausea, vomiting, diarrhea or constipation.  MUSCULOSKELETAL: Reports longstanding back pain        PHYSICAL EXAM:  GENERAL: well-groomed, well-developed  NERVOUS SYSTEM:  Alert & Oriented X3, Good concentration; Motor Strength 5/5 B/L upper and lower extremities  ABDOMEN: Surgical incision present + tenderness      Vital Signs Last 24 Hrs  T(C): 36.7 (19 Jun 2018 11:34), Max: 37.8 (18 Jun 2018 19:02)  T(F): 98 (19 Jun 2018 11:34), Max: 100 (18 Jun 2018 19:02)  HR: 81 (19 Jun 2018 11:34) (81 - 105)  BP: 139/74 (19 Jun 2018 11:34) (126/62 - 175/98)  BP(mean): 83 (19 Jun 2018 06:00) (76 - 119)  RR: 18 (19 Jun 2018 11:34) (10 - 112)  SpO2: 94% (19 Jun 2018 11:34) (94% - 100%)      LABS:                          10.3   10.8  )-----------( 187      ( 19 Jun 2018 04:11 )             32.0     06-19    137  |  101  |  13.0  ----------------------------<  150<H>  4.0   |  24.0  |  1.14    Ca    8.2<L>      19 Jun 2018 04:11  Phos  4.1     06-19  Mg     2.0     06-19

## 2018-06-19 NOTE — PROGRESS NOTE ADULT - PROBLEM SELECTOR PLAN 1
s/p procedure above  Management per primary team   Contacted primary team regarding patient's pain   Continue Regular DASH diet  Incentive Spirometer  DVT prophylaxis   OOB as tolerated

## 2018-06-19 NOTE — PROGRESS NOTE ADULT - ASSESSMENT
ASSESSMENT/PLAN:  67yFemale POD 1 b/l breast mastectomy with breast reconstruction with RIO flap, CORA/BSO.  -pain control, meds readjusted this AM and will reassess   -q2 hour tissue oxygenation checks - PLEASE NOTIFY PROVIDER FOR DROP IN TISSUE OXYGENATION % GREATER THAN 10% IN 30 MINS. will continue monitor for a total of 48 hours  -Dr. Arnold consulted - will follow-up and appreciate consult  -oob to chair today  -d/c jumana, f/u TOV  -transfer to floor  -regular diet  -dvt ppx heparin sq  -monitor I/o's - PRIMO output  -discussed with Dr. Shepherd

## 2018-06-20 LAB
ANION GAP SERPL CALC-SCNC: 11 MMOL/L — SIGNIFICANT CHANGE UP (ref 5–17)
BUN SERPL-MCNC: 13 MG/DL — SIGNIFICANT CHANGE UP (ref 8–20)
CALCIUM SERPL-MCNC: 8.5 MG/DL — LOW (ref 8.6–10.2)
CHLORIDE SERPL-SCNC: 101 MMOL/L — SIGNIFICANT CHANGE UP (ref 98–107)
CO2 SERPL-SCNC: 27 MMOL/L — SIGNIFICANT CHANGE UP (ref 22–29)
CREAT SERPL-MCNC: 0.85 MG/DL — SIGNIFICANT CHANGE UP (ref 0.5–1.3)
GLUCOSE SERPL-MCNC: 120 MG/DL — HIGH (ref 70–115)
HCT VFR BLD CALC: 31.2 % — LOW (ref 37–47)
HGB BLD-MCNC: 10 G/DL — LOW (ref 12–16)
MAGNESIUM SERPL-MCNC: 2.1 MG/DL — SIGNIFICANT CHANGE UP (ref 1.6–2.6)
MCHC RBC-ENTMCNC: 28.8 PG — SIGNIFICANT CHANGE UP (ref 27–31)
MCHC RBC-ENTMCNC: 32.1 G/DL — SIGNIFICANT CHANGE UP (ref 32–36)
MCV RBC AUTO: 89.9 FL — SIGNIFICANT CHANGE UP (ref 81–99)
NON-GYNECOLOGICAL CYTOLOGY STUDY: SIGNIFICANT CHANGE UP
PHOSPHATE SERPL-MCNC: 3 MG/DL — SIGNIFICANT CHANGE UP (ref 2.4–4.7)
PLATELET # BLD AUTO: 197 K/UL — SIGNIFICANT CHANGE UP (ref 150–400)
POTASSIUM SERPL-MCNC: 4.1 MMOL/L — SIGNIFICANT CHANGE UP (ref 3.5–5.3)
POTASSIUM SERPL-SCNC: 4.1 MMOL/L — SIGNIFICANT CHANGE UP (ref 3.5–5.3)
RBC # BLD: 3.47 M/UL — LOW (ref 4.4–5.2)
RBC # FLD: 14.2 % — SIGNIFICANT CHANGE UP (ref 11–15.6)
SODIUM SERPL-SCNC: 139 MMOL/L — SIGNIFICANT CHANGE UP (ref 135–145)
WBC # BLD: 9.4 K/UL — SIGNIFICANT CHANGE UP (ref 4.8–10.8)
WBC # FLD AUTO: 9.4 K/UL — SIGNIFICANT CHANGE UP (ref 4.8–10.8)

## 2018-06-20 PROCEDURE — 99233 SBSQ HOSP IP/OBS HIGH 50: CPT

## 2018-06-20 RX ORDER — ACETAMINOPHEN 500 MG
1000 TABLET ORAL ONCE
Qty: 0 | Refills: 0 | Status: COMPLETED | OUTPATIENT
Start: 2018-06-20 | End: 2018-06-20

## 2018-06-20 RX ORDER — TIZANIDINE 4 MG/1
2 TABLET ORAL EVERY 6 HOURS
Qty: 0 | Refills: 0 | Status: DISCONTINUED | OUTPATIENT
Start: 2018-06-20 | End: 2018-06-21

## 2018-06-20 RX ORDER — HYDROMORPHONE HYDROCHLORIDE 2 MG/ML
1 INJECTION INTRAMUSCULAR; INTRAVENOUS; SUBCUTANEOUS
Qty: 0 | Refills: 0 | Status: DISCONTINUED | OUTPATIENT
Start: 2018-06-20 | End: 2018-06-21

## 2018-06-20 RX ORDER — DIAZEPAM 5 MG
4 TABLET ORAL ONCE
Qty: 0 | Refills: 0 | Status: DISCONTINUED | OUTPATIENT
Start: 2018-06-20 | End: 2018-06-20

## 2018-06-20 RX ORDER — OXYCODONE HYDROCHLORIDE 5 MG/1
20 TABLET ORAL EVERY 8 HOURS
Qty: 0 | Refills: 0 | Status: DISCONTINUED | OUTPATIENT
Start: 2018-06-20 | End: 2018-06-21

## 2018-06-20 RX ORDER — HYDRALAZINE HCL 50 MG
10 TABLET ORAL EVERY 6 HOURS
Qty: 0 | Refills: 0 | Status: DISCONTINUED | OUTPATIENT
Start: 2018-06-20 | End: 2018-06-22

## 2018-06-20 RX ADMIN — OXYCODONE HYDROCHLORIDE 20 MILLIGRAM(S): 5 TABLET ORAL at 09:55

## 2018-06-20 RX ADMIN — TIOTROPIUM BROMIDE 1 CAPSULE(S): 18 CAPSULE ORAL; RESPIRATORY (INHALATION) at 11:00

## 2018-06-20 RX ADMIN — Medication 75 MILLIGRAM(S): at 07:01

## 2018-06-20 RX ADMIN — Medication 100 MILLIGRAM(S): at 07:00

## 2018-06-20 RX ADMIN — Medication 1 MILLIGRAM(S): at 22:04

## 2018-06-20 RX ADMIN — DULOXETINE HYDROCHLORIDE 60 MILLIGRAM(S): 30 CAPSULE, DELAYED RELEASE ORAL at 12:35

## 2018-06-20 RX ADMIN — VALSARTAN 160 MILLIGRAM(S): 80 TABLET ORAL at 07:00

## 2018-06-20 RX ADMIN — Medication 650 MILLIGRAM(S): at 17:50

## 2018-06-20 RX ADMIN — Medication 650 MILLIGRAM(S): at 12:35

## 2018-06-20 RX ADMIN — Medication 4 MILLIGRAM(S): at 10:29

## 2018-06-20 RX ADMIN — OXYCODONE HYDROCHLORIDE 20 MILLIGRAM(S): 5 TABLET ORAL at 17:50

## 2018-06-20 RX ADMIN — TIZANIDINE 2 MILLIGRAM(S): 4 TABLET ORAL at 18:47

## 2018-06-20 RX ADMIN — Medication 1000 MILLIGRAM(S): at 10:45

## 2018-06-20 RX ADMIN — OXYCODONE HYDROCHLORIDE 20 MILLIGRAM(S): 5 TABLET ORAL at 17:20

## 2018-06-20 RX ADMIN — HEPARIN SODIUM 5000 UNIT(S): 5000 INJECTION INTRAVENOUS; SUBCUTANEOUS at 23:36

## 2018-06-20 RX ADMIN — HEPARIN SODIUM 5000 UNIT(S): 5000 INJECTION INTRAVENOUS; SUBCUTANEOUS at 15:41

## 2018-06-20 RX ADMIN — HYDROMORPHONE HYDROCHLORIDE 30 MILLILITER(S): 2 INJECTION INTRAMUSCULAR; INTRAVENOUS; SUBCUTANEOUS at 19:32

## 2018-06-20 RX ADMIN — Medication 650 MILLIGRAM(S): at 23:35

## 2018-06-20 RX ADMIN — HEPARIN SODIUM 5000 UNIT(S): 5000 INJECTION INTRAVENOUS; SUBCUTANEOUS at 07:00

## 2018-06-20 RX ADMIN — OXYCODONE HYDROCHLORIDE 20 MILLIGRAM(S): 5 TABLET ORAL at 09:23

## 2018-06-20 RX ADMIN — Medication 75 MILLIGRAM(S): at 15:41

## 2018-06-20 RX ADMIN — OXYCODONE HYDROCHLORIDE 10 MILLIGRAM(S): 5 TABLET ORAL at 00:13

## 2018-06-20 RX ADMIN — Medication 400 MILLIGRAM(S): at 10:29

## 2018-06-20 RX ADMIN — TIZANIDINE 2 MILLIGRAM(S): 4 TABLET ORAL at 23:36

## 2018-06-20 RX ADMIN — Medication 650 MILLIGRAM(S): at 13:10

## 2018-06-20 RX ADMIN — HYDROMORPHONE HYDROCHLORIDE 30 MILLILITER(S): 2 INJECTION INTRAMUSCULAR; INTRAVENOUS; SUBCUTANEOUS at 07:32

## 2018-06-20 RX ADMIN — BUDESONIDE AND FORMOTEROL FUMARATE DIHYDRATE 2 PUFF(S): 160; 4.5 AEROSOL RESPIRATORY (INHALATION) at 11:00

## 2018-06-20 RX ADMIN — PANTOPRAZOLE SODIUM 40 MILLIGRAM(S): 20 TABLET, DELAYED RELEASE ORAL at 09:23

## 2018-06-20 RX ADMIN — Medication 650 MILLIGRAM(S): at 17:20

## 2018-06-20 RX ADMIN — ONDANSETRON 4 MILLIGRAM(S): 8 TABLET, FILM COATED ORAL at 00:56

## 2018-06-20 NOTE — PROGRESS NOTE ADULT - PROBLEM SELECTOR PLAN 1
s/p procedure above  Care per primary team  Will reach out to discuss better pain control    All of the above discussed with Dr. Menendez s/p procedure above  Care per primary team  Pain management following, pt seen this morning following rounds and meds adjusted.     All of the above discussed with Dr. Menendez

## 2018-06-20 NOTE — PROGRESS NOTE ADULT - SUBJECTIVE AND OBJECTIVE BOX
HPI:  67 year old female who had a history of breast cancer in 2005, S/P chemo and radiation. Admitted for newly diagnosed malignancy. She is now POD# 2  b/l mastectomy with RIO flap, CORA/BSO, abdominoplasty.      SUBJECTIVE:  "The pump is helping very little."  Patient reports relief with use of Dilaudid IV Push. She reports most pain at the abdominal incision    PAIN SCORE:  9/10 at rest  (VNRS)      Allergies  adhesives (Rash)  No Known Drug Allergies      THERAPY:    [ ] IV PCA Morphine		[ ] 5 mg/mL	[ ] 1 mg/mL  [X] IV PCA Hydromorphone	[ ] 5 mg/mL	[X] 1 mg/mL  [ ] IV PCA Fentanyl		[ ] 50 micrograms/mL    Demand dose _0.3mg_ lockout _6_ (minutes) Continuous Rate _0_ Total: _20mg_  Daily      OBJECTIVE:    Sedation Score:	[X] Alert	[ ] Drowsy	[ ] Arousable	[ ] Asleep	[ ] Unresponsive    Side Effects:	[X] None	[ ] Nausea	[ ] Vomiting	[ ] Pruritus  		  [ ] Weakness		[ ] Numbness	[ ] Other:      MEDICATIONS  (STANDING):  acetaminophen   Tablet. 650 milliGRAM(s) Oral every 6 hours  acetaminophen  IVPB. 1000 milliGRAM(s) IV Intermittent once  ALPRAZolam 1 milliGRAM(s) Oral at bedtime  buDESOnide  80 MICROgram(s)/formoterol 4.5 MICROgram(s) Inhaler 2 Puff(s) Inhalation two times a day  diazepam    Tablet 4 milliGRAM(s) Oral once  DULoxetine 60 milliGRAM(s) Oral daily  heparin  Injectable 5000 Unit(s) SubCutaneous every 8 hours  HYDROmorphone PCA (1 mG/mL) 30 milliLiter(s) PCA Continuous PCA Continuous  lactated ringers. 1000 milliLiter(s) (30 mL/Hr) IV Continuous <Continuous>  metoprolol succinate  milliGRAM(s) Oral daily  oxyCODONE  ER Tablet 20 milliGRAM(s) Oral every 8 hours  pantoprazole   Suspension 40 milliGRAM(s) Oral before breakfast  pregabalin 75 milliGRAM(s) Oral two times a day  tiotropium 18 MICROgram(s) Capsule 1 Capsule(s) Inhalation daily  valsartan 160 milliGRAM(s) Oral daily    MEDICATIONS  (PRN):  ALPRAZolam 0.5 milliGRAM(s) Oral three times a day PRN anxiety  diphenhydrAMINE   Injectable 25 milliGRAM(s) IV Push every 6 hours PRN Pruritus  HYDROmorphone PCA (1 mG/mL) Rescue Clinician Bolus 1 milliGRAM(s) IV Push every 15 minutes PRN for Pain Scale GREATER THAN 6  naloxone Injectable 0.1 milliGRAM(s) IV Push every 3 minutes PRN For ANY of the following changes in patient status:  A. RR LESS THAN 10 breaths per minute, B. Oxygen saturation LESS THAN 90%, C. Sedation score of 6  ondansetron Injectable 4 milliGRAM(s) IV Push every 6 hours PRN Nausea        LABS:                        10.0   9.4   )-----------( 197      ( 20 Jun 2018 06:08 )             31.2       06-20    139  |  101  |  13.0  ----------------------------<  120<H>  4.1   |  27.0  |  0.85    Ca    8.5<L>      20 Jun 2018 06:08  Phos  3.0     06-20  Mg     2.1     06-20

## 2018-06-20 NOTE — PROGRESS NOTE ADULT - ASSESSMENT
A&Ox3, supine in bed, writhing in pain.    PLAN  Therapy to  be:	[X] Continue   [ ] Discontinued   [ ] Change to prn Analgesics    Documentation and Verification of current medications:  [X] Done	[ ] Not done, not eligible  [ ] Not done, reason not given

## 2018-06-20 NOTE — PROGRESS NOTE ADULT - SUBJECTIVE AND OBJECTIVE BOX
SURGICAL PA NOTE:     STATUS POST:  6/18    OPERATION:  Bilateral skin sparing mastectomy with sentinel lymph node biopsy bilaterally.    OPERATION:    1.	Bilateral mastectomies and sentinel node biopsies done by Dr. Danita Fischer.    2.	Bilateral oophorectomies and total hysterectomy done by Dr. Domingo Menendez.    3.	Bilateral immediate breast reconstruction using contralateral deep inferior epigastric  flaps anastomosed to the internal mammary vessels performed by Dr. Rj Whitfield.  4.	The patient also underwent bilateral internal mammary lymph node sampling.      POST OPERATIVE DAY #: 2    Vital Signs Last 24 Hrs  T(C): 36.8 (20 Jun 2018 16:23), Max: 37.3 (20 Jun 2018 09:12)  T(F): 98.2 (20 Jun 2018 16:23), Max: 99.1 (20 Jun 2018 09:12)  HR: 75 (20 Jun 2018 16:23) (73 - 105)  BP: 130/75 (20 Jun 2018 16:23) (130/75 - 166/83)  BP(mean): --  RR: 18 (20 Jun 2018 16:23) (18 - 20)  SpO2: 95% (20 Jun 2018 16:23) (90% - 98%)    HPI:      Malignant neoplasm of left female breast  Family history of throat cancer (Father)  Family history of breast cancer in female (Mother)  Handoff  MEWS Score  GERD (gastroesophageal reflux disease)  Back pain  Hypertension  COPD (chronic obstructive pulmonary disease)  Chronic back pain, unspecified back location, unspecified back pain laterality  Pain at surgical incision  BRCA1 gene mutation positive  Breast cancer  Vocal cord polyp  History of lumpectomy  MALIGNANT NEOPLASM OF UNSPECIF      SUBJECTIVE: Pt seen lying supine with HOB up, OOB to chair today, + void, no BM, pain better controlled this afternoon    Diet: regular    Activity: OOB/amb    Fevers: [ ]Yes [x ]NO  Chills: [ ] Yes [x ] NO  SOB:  [ ] YES [ x] NO  Dyspnea: [ ]YES [ x]NO  Chest Discomfort: [ ] YES [x] NO    Nausea: [ ] YES [x ] NO           Vomiting: [ ] YES [ x] NO  Flatus: [ ] YES [ ] NO             Bowel Movement: [ ] YES [ ] NO  Diarrhea: [ ] YES [ ] NO         Void: [x ]YES [ ]No  Constipation: [ ] YES [ ] NO       Pain (0-10):      5/10        Pain Control Adequate: [ ] YES [ ] NO    Santana:    NGT:      I&O's Detail    19 Jun 2018 07:01  -  20 Jun 2018 07:00  --------------------------------------------------------  IN:    lactated ringers.: 100 mL    lactated ringers.: 420 mL    Oral Fluid: 960 mL  Total IN: 1480 mL    OUT:    Drain: 5 mL    Drain: 55 mL    Drain: 115 mL    Drain: 85 mL    Indwelling Catheter - Urethral: 35 mL    Voided: 450 mL  Total OUT: 745 mL    Total NET: 735 mL      20 Jun 2018 07:01  -  20 Jun 2018 18:29  --------------------------------------------------------  IN:    lactated ringers.: 210 mL    Oral Fluid: 320 mL  Total IN: 530 mL    OUT:    Drain: 30 mL    Drain: 70 mL    Drain: 30 mL    Drain: 50 mL  Total OUT: 180 mL    Total NET: 350 mL        I&O's Summary    19 Jun 2018 07:01  -  20 Jun 2018 07:00  --------------------------------------------------------  IN: 1480 mL / OUT: 745 mL / NET: 735 mL    20 Jun 2018 07:01  -  20 Jun 2018 18:29  --------------------------------------------------------  IN: 530 mL / OUT: 180 mL / NET: 350 mL          MEDICATIONS  (STANDING):  acetaminophen   Tablet. 650 milliGRAM(s) Oral every 6 hours  ALPRAZolam 1 milliGRAM(s) Oral at bedtime  buDESOnide  80 MICROgram(s)/formoterol 4.5 MICROgram(s) Inhaler 2 Puff(s) Inhalation two times a day  DULoxetine 60 milliGRAM(s) Oral daily  heparin  Injectable 5000 Unit(s) SubCutaneous every 8 hours  HYDROmorphone PCA (1 mG/mL) 30 milliLiter(s) PCA Continuous PCA Continuous  lactated ringers. 1000 milliLiter(s) (30 mL/Hr) IV Continuous <Continuous>  metoprolol succinate  milliGRAM(s) Oral daily  oxyCODONE  ER Tablet 20 milliGRAM(s) Oral every 8 hours  pantoprazole   Suspension 40 milliGRAM(s) Oral before breakfast  pregabalin 75 milliGRAM(s) Oral every 8 hours  tiotropium 18 MICROgram(s) Capsule 1 Capsule(s) Inhalation daily  tiZANidine 2 milliGRAM(s) Oral every 6 hours  valsartan 160 milliGRAM(s) Oral daily    MEDICATIONS  (PRN):  ALPRAZolam 0.5 milliGRAM(s) Oral three times a day PRN anxiety  diphenhydrAMINE   Injectable 25 milliGRAM(s) IV Push every 6 hours PRN Pruritus  hydrALAZINE Injectable 10 milliGRAM(s) IV Push every 6 hours PRN for SBP > 170  HYDROmorphone PCA (1 mG/mL) Rescue Clinician Bolus 1 milliGRAM(s) IV Push every 15 minutes PRN for Pain Scale GREATER THAN 6  naloxone Injectable 0.1 milliGRAM(s) IV Push every 3 minutes PRN For ANY of the following changes in patient status:  A. RR LESS THAN 10 breaths per minute, B. Oxygen saturation LESS THAN 90%, C. Sedation score of 6  ondansetron Injectable 4 milliGRAM(s) IV Push every 6 hours PRN Nausea      LABS:                        10.0   9.4   )-----------( 197      ( 20 Jun 2018 06:08 )             31.2     06-20    139  |  101  |  13.0  ----------------------------<  120<H>  4.1   |  27.0  |  0.85    Ca    8.5<L>      20 Jun 2018 06:08  Phos  3.0     06-20  Mg     2.1     06-20              RADIOLOGY & ADDITIONAL STUDIES:

## 2018-06-20 NOTE — PROGRESS NOTE ADULT - SUBJECTIVE AND OBJECTIVE BOX
Patient out of bed to chair, drowsy. Still reports abdominal pain, but appears more comfortable than this morning. Reports relief with clinician bolus and Valium previously administered. Plan: add Tizanidine 2mg PO q6hrs x 2 days for spasticity. Continue with IV PCA and current regimen. Pain service will follow up on 06/21/18.

## 2018-06-20 NOTE — PROGRESS NOTE ADULT - SUBJECTIVE AND OBJECTIVE BOX
Patient is seen and examined ,  s/p   b/l mastectomy with RIO flap, abdominoplasty, CORA/BSO, POD # 2      CC; Breast cancer , s/p   b/l mastectomy with RIO flap, abdominoplasty, CORA/BSO. Pain at surgical sites +          MEDICATIONS  (STANDING):  acetaminophen   Tablet. 650 milliGRAM(s) Oral every 6 hours  ALPRAZolam 1 milliGRAM(s) Oral at bedtime  buDESOnide  80 MICROgram(s)/formoterol 4.5 MICROgram(s) Inhaler 2 Puff(s) Inhalation two times a day  DULoxetine 60 milliGRAM(s) Oral daily  heparin  Injectable 5000 Unit(s) SubCutaneous every 8 hours  HYDROmorphone PCA (1 mG/mL) 30 milliLiter(s) PCA Continuous PCA Continuous  lactated ringers. 1000 milliLiter(s) (30 mL/Hr) IV Continuous <Continuous>  metoprolol succinate  milliGRAM(s) Oral daily  oxyCODONE  ER Tablet 20 milliGRAM(s) Oral every 8 hours  pantoprazole   Suspension 40 milliGRAM(s) Oral before breakfast  pregabalin 75 milliGRAM(s) Oral every 8 hours  tiotropium 18 MICROgram(s) Capsule 1 Capsule(s) Inhalation daily  valsartan 160 milliGRAM(s) Oral daily    MEDICATIONS  (PRN):  ALPRAZolam 0.5 milliGRAM(s) Oral three times a day PRN anxiety  diphenhydrAMINE   Injectable 25 milliGRAM(s) IV Push every 6 hours PRN Pruritus  HYDROmorphone PCA (1 mG/mL) Rescue Clinician Bolus 1 milliGRAM(s) IV Push every 15 minutes PRN for Pain Scale GREATER THAN 6  naloxone Injectable 0.1 milliGRAM(s) IV Push every 3 minutes PRN For ANY of the following changes in patient status:  A. RR LESS THAN 10 breaths per minute, B. Oxygen saturation LESS THAN 90%, C. Sedation score of 6  ondansetron Injectable 4 milliGRAM(s) IV Push every 6 hours PRN Nausea      LABS:                          10.0   9.4   )-----------( 197      ( 20 Jun 2018 06:08 )             31.2     06-20    139  |  101  |  13.0  ----------------------------<  120<H>  4.1   |  27.0  |  0.85    Ca    8.5<L>      20 Jun 2018 06:08  Phos  3.0     06-20  Mg     2.1     06-20            RADIOLOGY & ADDITIONAL TESTS:      REVIEW OF SYSTEMS:    CONSTITUTIONAL: No fever, weight loss, or fatigue  EYES: No eye pain, visual disturbances, or discharge  ENMT:  No difficulty hearing, tinnitus, vertigo; No sinus or throat pain  NECK: No pain or stiffness  RESPIRATORY: No cough, wheezing, chills or hemoptysis; No shortness of breath  CARDIOVASCULAR: No chest pain, palpitations, dizziness, or leg swelling  GASTROINTESTINAL: No abdominal or epigastric pain. No nausea, vomiting, or hematemesis; No diarrhea or constipation. No melena or hematochezia.  GENITOURINARY: No dysuria, frequency, hematuria, or incontinence  NEUROLOGICAL: No headaches, memory loss, loss of strength, numbness, or tremors  SKIN: No itching, burning, rashes, or lesions   LYMPH NODES: No enlarged glands  ENDOCRINE: No heat or cold intolerance; No hair loss  MUSCULOSKELETAL:   PSYCHIATRIC: No depression, anxiety, mood swings, or difficulty sleeping  HEME/LYMPH: No easy bruising, or bleeding gums  ALLERGY AND IMMUNOLOGIC: No hives or eczema    Vital Signs Last 24 Hrs  T(C): 37.3 (20 Jun 2018 09:12), Max: 37.3 (20 Jun 2018 09:12)  T(F): 99.1 (20 Jun 2018 09:12), Max: 99.1 (20 Jun 2018 09:12)  HR: 73 (20 Jun 2018 11:00) (73 - 105)  BP: 166/83 (20 Jun 2018 09:12) (137/79 - 166/83)  BP(mean): --  RR: 18 (20 Jun 2018 09:12) (18 - 20)  SpO2: 95% (20 Jun 2018 11:00) (90% - 98%)  PHYSICAL EXAM:    GENERAL: NAD, well-groomed, well-developed  HEAD:  Atraumatic, Normocephalic  EYES: EOMI, PERRLA, conjunctiva and sclera clear  NECK: Supple, No JVD, Normal thyroid  NERVOUS SYSTEM:  Alert & Oriented X3, no focal deficit  CHEST/LUNG: CTA b/l ,  no  rales, rhonchi, wheezing, or rubs  HEART: Regular rate and rhythm; No murmurs, rubs, or gallops  ABDOMEN: Soft, Nontender, Nondistended; Bowel sounds present  EXTREMITIES:  2+ Peripheral Pulses, No clubbing, cyanosis, or edema  LYMPH: No lymphadenopathy noted  SKIN: No rashes or lesions Patient is seen and examined ,  s/p   b/l mastectomy with RIO flap, abdominoplasty, CORA/BSO, POD # 2. Pain still not well controlled , no other complaints .      CC; Breast cancer , s/p   b/l mastectomy with RIO flap, abdominoplasty, CORA/BSO. Pain at surgical sites +          MEDICATIONS  (STANDING):  acetaminophen   Tablet. 650 milliGRAM(s) Oral every 6 hours  ALPRAZolam 1 milliGRAM(s) Oral at bedtime  buDESOnide  80 MICROgram(s)/formoterol 4.5 MICROgram(s) Inhaler 2 Puff(s) Inhalation two times a day  DULoxetine 60 milliGRAM(s) Oral daily  heparin  Injectable 5000 Unit(s) SubCutaneous every 8 hours  HYDROmorphone PCA (1 mG/mL) 30 milliLiter(s) PCA Continuous PCA Continuous  lactated ringers. 1000 milliLiter(s) (30 mL/Hr) IV Continuous <Continuous>  metoprolol succinate  milliGRAM(s) Oral daily  oxyCODONE  ER Tablet 20 milliGRAM(s) Oral every 8 hours  pantoprazole   Suspension 40 milliGRAM(s) Oral before breakfast  pregabalin 75 milliGRAM(s) Oral every 8 hours  tiotropium 18 MICROgram(s) Capsule 1 Capsule(s) Inhalation daily  valsartan 160 milliGRAM(s) Oral daily    MEDICATIONS  (PRN):  ALPRAZolam 0.5 milliGRAM(s) Oral three times a day PRN anxiety  diphenhydrAMINE   Injectable 25 milliGRAM(s) IV Push every 6 hours PRN Pruritus  HYDROmorphone PCA (1 mG/mL) Rescue Clinician Bolus 1 milliGRAM(s) IV Push every 15 minutes PRN for Pain Scale GREATER THAN 6  naloxone Injectable 0.1 milliGRAM(s) IV Push every 3 minutes PRN For ANY of the following changes in patient status:  A. RR LESS THAN 10 breaths per minute, B. Oxygen saturation LESS THAN 90%, C. Sedation score of 6  ondansetron Injectable 4 milliGRAM(s) IV Push every 6 hours PRN Nausea      LABS:                          10.0   9.4   )-----------( 197      ( 20 Jun 2018 06:08 )             31.2     06-20    139  |  101  |  13.0  ----------------------------<  120<H>  4.1   |  27.0  |  0.85    Ca    8.5<L>      20 Jun 2018 06:08  Phos  3.0     06-20  Mg     2.1     06-20        REVIEW OF SYSTEMS:    Pain at surgical sites ( lower abd and b/l breasts 0 not well controlled , all other systems are  reviewed and are negative .    Vital Signs Last 24 Hrs  T(C): 37.3 (20 Jun 2018 09:12), Max: 37.3 (20 Jun 2018 09:12)  T(F): 99.1 (20 Jun 2018 09:12), Max: 99.1 (20 Jun 2018 09:12)  HR: 73 (20 Jun 2018 11:00) (73 - 105)  BP: 166/83 (20 Jun 2018 09:12) (137/79 - 166/83)  BP(mean): --  RR: 18 (20 Jun 2018 09:12) (18 - 20)  SpO2: 95% (20 Jun 2018 11:00) (90% - 98%)    PHYSICAL EXAM:    GENERAL: NAD, well-groomed, well-developed  HEAD:  Atraumatic, Normocephalic  EYES: EOMI, PERRLA, conjunctiva and sclera clear  NECK: Supple, No JVD, Normal thyroid  Breast: Tissue oximetry in place b/l, surgical dressings in place , no ecchymosis no bluish discoloration no signs of tissue compromise.  NERVOUS SYSTEM:  Alert & Oriented X3, no focal deficit  CHEST/LUNG: CTA b/l ,  no  rales, rhonchi, wheezing, or rubs  HEART: Regular rate and rhythm; No murmurs, rubs, or gallops  ABDOMEN: Soft, Nontender, Nondistended; Bowel sounds present ,  no guarding or rebound ,  abdominoplasty surgical incision well approximated, no active drainage or bleeding. drains with serosang output.  No nausea, vomiting, or hematemesis; No diarrhea or constipation. No melena or hematochezia.  EXTREMITIES:  2+ Peripheral Pulses, No clubbing, cyanosis, or edema  LYMPH: No lymphadenopathy noted  SKIN: No rashes or lesions

## 2018-06-20 NOTE — PROGRESS NOTE ADULT - PROBLEM SELECTOR PLAN 1
d/c oximetry per Dr Shepherd, pain management per pain service, monitor PRIMO x 4 outputs, OOB/amb 6/21, adv diet as tolerated, plan per Dr Fischer/Joao

## 2018-06-20 NOTE — PROGRESS NOTE ADULT - ASSESSMENT
67 year old POD # 2 ,  s/p CORA BSO, pelvic washings and Mastectomy with breast reconstruction with RIO flaps      Problem/Plan - 1:  ·  Problem: Breast cancer.  Plan: s/p procedure above  Management per primary team   Incentive Spirometer  DVT prophylaxis   OOB as tolerated.      Problem/Plan - 2:  ·  Problem: BRCA1 gene mutation positive.  Plan: S/p CORA BSO and pelvic washings , as per GYN Onc team    Problem / Plan - 3 ; HTN - continue home meds with parameters     Problem / Plan - 4: COPD - stable - continue home meds     Problem / Plan -5: GERD - Protonix added     Problem / Plan -6; Acute postoperative pain on chronic pain - pain not well controlled . Patient does follow up wit pain mgmt . Consider pain mgmt eval . Restart Lyrica / Cymbalta  Problem / Plan - 8: Anxiety - continue Xanax   Problem / Plan - 9: DVT prophylaxis - on Heparin as per primary team                              Opoid induced constipation prophylaxis - consider stool softener/ laxatives - increase ambulation .    Santana cath removed - TOV in progress . 67 year old POD # 2 ,  s/p CORA BSO, pelvic washings and Mastectomy with breast reconstruction with RIO flaps      Problem/Plan - 1:  ·  Problem: Breast cancer.  Plan: s/p procedure above  Management per primary team   Incentive Spirometer  DVT prophylaxis   OOB as tolerated.      Problem/Plan - 2:  ·  Problem: BRCA1 gene mutation positive.  Plan: S/p CORA BSO and pelvic washings , as per GYN Onc team    Problem / Plan - 3 ; HTN - continue home meds with parameters . BP on higher side - likely secondary to pain , will order Hydralazine PRN for SBP > 170    Problem / Plan - 4: COPD - stable - continue home meds     Problem / Plan -5: GERD - Protonix added     Problem / Plan -6; Acute postoperative pain on chronic pain - pain not well controlled . Pain mgmt noted and appreciated , PCA ordered   Problem / Plan - 8: Anxiety - continue Xanax   Problem / Plan - 9: DVT prophylaxis - on Heparin as per primary team                              Opoid induced constipation prophylaxis - consider stool softener/ laxatives - increase ambulation .     Problem / Plan - 10 : Decreased eGFR - improved with ivf , likely secondary to pre renal azotemia .    Santana cath removed - voiding

## 2018-06-20 NOTE — PROGRESS NOTE ADULT - SUBJECTIVE AND OBJECTIVE BOX
GYNECOLOGIC ONCOLOGY PROGRESS NOTE    POD#2    PROBLEMS:  Chronic back pain, unspecified back location, unspecified back pain laterality  Pain at surgical incision  BRCA1 gene mutation positive  Breast cancer  HTN  COPD  GERD    Pt seen and examined at bedside with Dr. Davis.     SUBJECTIVE:    Patient with inadequate pain control. Admits to nausea  Denies Vomiting or Diarrhea.  Denies shortness of breath, chest pain or dyspnea on exertion.  Tolerating diet.    OBJECTIVE:     VITALS:  T(F): 99.1 (06-20-18 @ 09:12), Max: 99.1 (06-20-18 @ 09:12)  HR: 103 (06-20-18 @ 09:12) (81 - 105)  BP: 166/83 (06-20-18 @ 09:12) (137/79 - 166/83)  RR: 18 (06-20-18 @ 09:12) (18 - 20)  SpO2: 95% (06-20-18 @ 09:12) (90% - 98%)      I&O's Summary  Voiding-125cc's overnight.  L. abdominal drain-20cc's.  R. abdominal drain-0cc's.    MEDICATIONS  (STANDING):  acetaminophen   Tablet. 650 milliGRAM(s) Oral every 6 hours  ALPRAZolam 1 milliGRAM(s) Oral at bedtime  buDESOnide  80 MICROgram(s)/formoterol 4.5 MICROgram(s) Inhaler 2 Puff(s) Inhalation two times a day  DULoxetine 60 milliGRAM(s) Oral daily  heparin  Injectable 5000 Unit(s) SubCutaneous every 8 hours  HYDROmorphone PCA (1 mG/mL) 30 milliLiter(s) PCA Continuous PCA Continuous  lactated ringers. 1000 milliLiter(s) (30 mL/Hr) IV Continuous <Continuous>  metoprolol succinate  milliGRAM(s) Oral daily  oxyCODONE  ER Tablet 20 milliGRAM(s) Oral every 8 hours  pantoprazole   Suspension 40 milliGRAM(s) Oral before breakfast  pregabalin 75 milliGRAM(s) Oral every 8 hours  tiotropium 18 MICROgram(s) Capsule 1 Capsule(s) Inhalation daily  valsartan 160 milliGRAM(s) Oral daily    MEDICATIONS  (PRN):  ALPRAZolam 0.5 milliGRAM(s) Oral three times a day PRN anxiety  diphenhydrAMINE   Injectable 25 milliGRAM(s) IV Push every 6 hours PRN Pruritus  HYDROmorphone PCA (1 mG/mL) Rescue Clinician Bolus 1 milliGRAM(s) IV Push every 15 minutes PRN for Pain Scale GREATER THAN 6  naloxone Injectable 0.1 milliGRAM(s) IV Push every 3 minutes PRN For ANY of the following changes in patient status:  A. RR LESS THAN 10 breaths per minute, B. Oxygen saturation LESS THAN 90%, C. Sedation score of 6  ondansetron Injectable 4 milliGRAM(s) IV Push every 6 hours PRN Nausea      Physical Exam:  Constitutional: NAD  Pulmonary: clear to auscultation bilaterally   Cardiovascular: Regular rate and rhythm   Abdomen: soft, non-tender, non-distended, normal bowel sounds  Extremities: no lower extremity edema or calve tenderness, Tashi's sign negative.  Abdominal Incision: Clean, dry, intact.  Without signs of infection or hernia.      LABS:                        10.0   9.4   )-----------( 197      ( 20 Jun 2018 06:08 )             31.2     06-20    139  |  101  |  13.0  ----------------------------<  120<H>  4.1   |  27.0  |  0.85    Ca    8.5<L>      20 Jun 2018 06:08  Phos  3.0     06-20  Mg     2.1     06-20

## 2018-06-20 NOTE — PROGRESS NOTE ADULT - PROBLEM SELECTOR PLAN 1
1. Increase Oxycontin 20mg PO to q8hrs  2. Continue Dilaudid PCA at current dose   - Add Clinician Bolus of 1mg for BTP; 1st dose now  3. Offer IV Tylenol 1gram now. Continue Tylenol 650mg PO q6hrs x 2days thereafter  4. Offer Valium 4mg PO once now.  Pain service will follow up later today.

## 2018-06-21 ENCOUNTER — TRANSCRIPTION ENCOUNTER (OUTPATIENT)
Age: 68
End: 2018-06-21

## 2018-06-21 LAB
ANION GAP SERPL CALC-SCNC: 9 MMOL/L — SIGNIFICANT CHANGE UP (ref 5–17)
BLD GP AB SCN SERPL QL: SIGNIFICANT CHANGE UP
BUN SERPL-MCNC: 11 MG/DL — SIGNIFICANT CHANGE UP (ref 8–20)
CALCIUM SERPL-MCNC: 8.1 MG/DL — LOW (ref 8.6–10.2)
CHLORIDE SERPL-SCNC: 103 MMOL/L — SIGNIFICANT CHANGE UP (ref 98–107)
CO2 SERPL-SCNC: 24 MMOL/L — SIGNIFICANT CHANGE UP (ref 22–29)
CREAT SERPL-MCNC: 0.91 MG/DL — SIGNIFICANT CHANGE UP (ref 0.5–1.3)
GLUCOSE SERPL-MCNC: 95 MG/DL — SIGNIFICANT CHANGE UP (ref 70–115)
HCT VFR BLD CALC: 28.2 % — LOW (ref 37–47)
HCT VFR BLD CALC: 28.6 % — LOW (ref 37–47)
HGB BLD-MCNC: 8.8 G/DL — LOW (ref 12–16)
HGB BLD-MCNC: 8.9 G/DL — LOW (ref 12–16)
LACTATE SERPL-SCNC: <0.2 MMOL/L — LOW (ref 0.5–2)
MAGNESIUM SERPL-MCNC: 2.1 MG/DL — SIGNIFICANT CHANGE UP (ref 1.6–2.6)
MCHC RBC-ENTMCNC: 27.7 PG — SIGNIFICANT CHANGE UP (ref 27–31)
MCHC RBC-ENTMCNC: 28.6 PG — SIGNIFICANT CHANGE UP (ref 27–31)
MCHC RBC-ENTMCNC: 31.1 G/DL — LOW (ref 32–36)
MCHC RBC-ENTMCNC: 31.2 G/DL — LOW (ref 32–36)
MCV RBC AUTO: 88.7 FL — SIGNIFICANT CHANGE UP (ref 81–99)
MCV RBC AUTO: 92 FL — SIGNIFICANT CHANGE UP (ref 81–99)
PHOSPHATE SERPL-MCNC: 3 MG/DL — SIGNIFICANT CHANGE UP (ref 2.4–4.7)
PLATELET # BLD AUTO: 189 K/UL — SIGNIFICANT CHANGE UP (ref 150–400)
PLATELET # BLD AUTO: 199 K/UL — SIGNIFICANT CHANGE UP (ref 150–400)
POTASSIUM SERPL-MCNC: 4.3 MMOL/L — SIGNIFICANT CHANGE UP (ref 3.5–5.3)
POTASSIUM SERPL-SCNC: 4.3 MMOL/L — SIGNIFICANT CHANGE UP (ref 3.5–5.3)
RBC # BLD: 3.11 M/UL — LOW (ref 4.4–5.2)
RBC # BLD: 3.18 M/UL — LOW (ref 4.4–5.2)
RBC # FLD: 14 % — SIGNIFICANT CHANGE UP (ref 11–15.6)
RBC # FLD: 14 % — SIGNIFICANT CHANGE UP (ref 11–15.6)
SODIUM SERPL-SCNC: 136 MMOL/L — SIGNIFICANT CHANGE UP (ref 135–145)
TYPE + AB SCN PNL BLD: SIGNIFICANT CHANGE UP
WBC # BLD: 7.3 K/UL — SIGNIFICANT CHANGE UP (ref 4.8–10.8)
WBC # BLD: 7.7 K/UL — SIGNIFICANT CHANGE UP (ref 4.8–10.8)
WBC # FLD AUTO: 7.3 K/UL — SIGNIFICANT CHANGE UP (ref 4.8–10.8)
WBC # FLD AUTO: 7.7 K/UL — SIGNIFICANT CHANGE UP (ref 4.8–10.8)

## 2018-06-21 PROCEDURE — 99233 SBSQ HOSP IP/OBS HIGH 50: CPT

## 2018-06-21 RX ORDER — SODIUM CHLORIDE 9 MG/ML
1000 INJECTION, SOLUTION INTRAVENOUS ONCE
Qty: 0 | Refills: 0 | Status: COMPLETED | OUTPATIENT
Start: 2018-06-21 | End: 2018-06-21

## 2018-06-21 RX ORDER — HYDROMORPHONE HYDROCHLORIDE 2 MG/ML
1 INJECTION INTRAMUSCULAR; INTRAVENOUS; SUBCUTANEOUS
Qty: 0 | Refills: 0 | Status: DISCONTINUED | OUTPATIENT
Start: 2018-06-21 | End: 2018-06-22

## 2018-06-21 RX ORDER — DOCUSATE SODIUM 100 MG
100 CAPSULE ORAL
Qty: 0 | Refills: 0 | Status: DISCONTINUED | OUTPATIENT
Start: 2018-06-21 | End: 2018-06-22

## 2018-06-21 RX ORDER — OXYCODONE HYDROCHLORIDE 5 MG/1
10 TABLET ORAL EVERY 4 HOURS
Qty: 0 | Refills: 0 | Status: DISCONTINUED | OUTPATIENT
Start: 2018-06-21 | End: 2018-06-22

## 2018-06-21 RX ORDER — ACETAMINOPHEN 500 MG
1000 TABLET ORAL ONCE
Qty: 0 | Refills: 0 | Status: COMPLETED | OUTPATIENT
Start: 2018-06-21 | End: 2018-06-21

## 2018-06-21 RX ORDER — OXYCODONE HYDROCHLORIDE 5 MG/1
20 TABLET ORAL
Qty: 0 | Refills: 0 | Status: DISCONTINUED | OUTPATIENT
Start: 2018-06-21 | End: 2018-06-22

## 2018-06-21 RX ORDER — SODIUM CHLORIDE 9 MG/ML
500 INJECTION, SOLUTION INTRAVENOUS ONCE
Qty: 0 | Refills: 0 | Status: COMPLETED | OUTPATIENT
Start: 2018-06-21 | End: 2018-06-21

## 2018-06-21 RX ORDER — TIZANIDINE 4 MG/1
2 TABLET ORAL EVERY 6 HOURS
Qty: 0 | Refills: 0 | Status: DISCONTINUED | OUTPATIENT
Start: 2018-06-21 | End: 2018-06-22

## 2018-06-21 RX ORDER — OXYCODONE HYDROCHLORIDE 5 MG/1
5 TABLET ORAL EVERY 4 HOURS
Qty: 0 | Refills: 0 | Status: DISCONTINUED | OUTPATIENT
Start: 2018-06-21 | End: 2018-06-22

## 2018-06-21 RX ADMIN — OXYCODONE HYDROCHLORIDE 20 MILLIGRAM(S): 5 TABLET ORAL at 23:13

## 2018-06-21 RX ADMIN — HYDROMORPHONE HYDROCHLORIDE 30 MILLILITER(S): 2 INJECTION INTRAMUSCULAR; INTRAVENOUS; SUBCUTANEOUS at 07:22

## 2018-06-21 RX ADMIN — SODIUM CHLORIDE 1000 MILLILITER(S): 9 INJECTION, SOLUTION INTRAVENOUS at 00:30

## 2018-06-21 RX ADMIN — Medication 650 MILLIGRAM(S): at 17:11

## 2018-06-21 RX ADMIN — OXYCODONE HYDROCHLORIDE 10 MILLIGRAM(S): 5 TABLET ORAL at 20:24

## 2018-06-21 RX ADMIN — HEPARIN SODIUM 5000 UNIT(S): 5000 INJECTION INTRAVENOUS; SUBCUTANEOUS at 06:43

## 2018-06-21 RX ADMIN — SODIUM CHLORIDE 1000 MILLILITER(S): 9 INJECTION, SOLUTION INTRAVENOUS at 14:21

## 2018-06-21 RX ADMIN — Medication 400 MILLIGRAM(S): at 12:40

## 2018-06-21 RX ADMIN — TIOTROPIUM BROMIDE 1 CAPSULE(S): 18 CAPSULE ORAL; RESPIRATORY (INHALATION) at 09:41

## 2018-06-21 RX ADMIN — PANTOPRAZOLE SODIUM 40 MILLIGRAM(S): 20 TABLET, DELAYED RELEASE ORAL at 12:44

## 2018-06-21 RX ADMIN — Medication 1 MILLIGRAM(S): at 23:14

## 2018-06-21 RX ADMIN — Medication 650 MILLIGRAM(S): at 19:00

## 2018-06-21 RX ADMIN — HEPARIN SODIUM 5000 UNIT(S): 5000 INJECTION INTRAVENOUS; SUBCUTANEOUS at 14:37

## 2018-06-21 RX ADMIN — BUDESONIDE AND FORMOTEROL FUMARATE DIHYDRATE 2 PUFF(S): 160; 4.5 AEROSOL RESPIRATORY (INHALATION) at 21:02

## 2018-06-21 RX ADMIN — SODIUM CHLORIDE 30 MILLILITER(S): 9 INJECTION, SOLUTION INTRAVENOUS at 02:10

## 2018-06-21 RX ADMIN — Medication 75 MILLIGRAM(S): at 12:44

## 2018-06-21 RX ADMIN — SODIUM CHLORIDE 1000 MILLILITER(S): 9 INJECTION, SOLUTION INTRAVENOUS at 01:45

## 2018-06-21 RX ADMIN — Medication 650 MILLIGRAM(S): at 12:54

## 2018-06-21 RX ADMIN — HEPARIN SODIUM 5000 UNIT(S): 5000 INJECTION INTRAVENOUS; SUBCUTANEOUS at 23:14

## 2018-06-21 RX ADMIN — Medication 650 MILLIGRAM(S): at 06:43

## 2018-06-21 RX ADMIN — OXYCODONE HYDROCHLORIDE 10 MILLIGRAM(S): 5 TABLET ORAL at 15:45

## 2018-06-21 RX ADMIN — OXYCODONE HYDROCHLORIDE 10 MILLIGRAM(S): 5 TABLET ORAL at 14:45

## 2018-06-21 RX ADMIN — TIZANIDINE 2 MILLIGRAM(S): 4 TABLET ORAL at 06:43

## 2018-06-21 RX ADMIN — Medication 650 MILLIGRAM(S): at 00:30

## 2018-06-21 RX ADMIN — Medication 100 MILLIGRAM(S): at 17:11

## 2018-06-21 RX ADMIN — DULOXETINE HYDROCHLORIDE 60 MILLIGRAM(S): 30 CAPSULE, DELAYED RELEASE ORAL at 12:45

## 2018-06-21 RX ADMIN — SODIUM CHLORIDE 2000 MILLILITER(S): 9 INJECTION, SOLUTION INTRAVENOUS at 12:45

## 2018-06-21 RX ADMIN — HYDROMORPHONE HYDROCHLORIDE 1 MILLIGRAM(S): 2 INJECTION INTRAMUSCULAR; INTRAVENOUS; SUBCUTANEOUS at 19:00

## 2018-06-21 RX ADMIN — Medication 650 MILLIGRAM(S): at 13:40

## 2018-06-21 RX ADMIN — Medication 650 MILLIGRAM(S): at 06:44

## 2018-06-21 RX ADMIN — Medication 75 MILLIGRAM(S): at 23:13

## 2018-06-21 RX ADMIN — Medication 1000 MILLIGRAM(S): at 13:40

## 2018-06-21 RX ADMIN — HYDROMORPHONE HYDROCHLORIDE 1 MILLIGRAM(S): 2 INJECTION INTRAMUSCULAR; INTRAVENOUS; SUBCUTANEOUS at 17:14

## 2018-06-21 NOTE — PROGRESS NOTE ADULT - PROBLEM SELECTOR PLAN 1
1. PCA dc'd by surgical team  2. Oxycontin frequency reduced to BID dosing. Offer 1st dose now to prevent onset of severe pain. RN aware.  3. Lyrica frequency reduced to BID dosing. Last given at 15:41 on 06/20/18. Offer 1st dose now.  4. Tizanidine changed to PRN dosing, for spasms, x 2days. Hold for oversedation or hypotension  5. Continue with Oxycodone IR PRN as ordered by surgical team  6. Continue with Tylenol as ordered.

## 2018-06-21 NOTE — PROGRESS NOTE ADULT - ASSESSMENT
67F s/p bilateral mastectomy with RIO reconstruction  \[facility]\"" BSO pod#4, doing well, hypotension transient likely due to hypovolemia  IV fluids given, will continue to assess volume status and resuscitate as needed   labs acceptable   encourage oob, ambulation   will cont to follow   hold lopressor am dose   gyn/onc following   dvt ppx   will checks flaps as needed for bedside doppler

## 2018-06-21 NOTE — DISCHARGE NOTE ADULT - MEDICATION SUMMARY - MEDICATIONS TO TAKE
I will START or STAY ON the medications listed below when I get home from the hospital:    oxyCODONE 20 mg oral tablet, extended release  -- 1 tab(s) by mouth 2 times a day, As Needed MDD:2  -- Indication: For Pain    oxyCODONE 10 mg oral tablet  -- 1 tab(s) by mouth every 6 hours, As Needed -Severe Pain (7 - 10) MDD:4  -- Indication: For Pain     mg oral tablet  -- 1 tab(s) by mouth every 8 hours, As Needed MDD:3   -- Do not take this drug if you are pregnant.  It is very important that you take or use this exactly as directed.  Do not skip doses or discontinue unless directed by your doctor.  May cause drowsiness or dizziness.  Obtain medical advice before taking any non-prescription drugs as some may affect the action of this medication.  Take with food or milk.    -- Indication: For Pain    Lyrica 75 mg oral capsule  -- 1 cap(s) by mouth 2 times a day, As Needed  -- Indication: For Pain    Cymbalta 60 mg oral delayed release capsule  -- 1 cap(s) by mouth once a day  -- Indication: For home med    valsartan-hydrochlorothiazide 160mg-12.5mg oral tablet  -- 1 tab(s) by mouth once a day  -- Indication: For home med    Xanax 1 mg oral tablet  -- 1 tab(s) by mouth once a day (at bedtime), As Needed MDD:1   -- Indication: For anxiety    Ambien 5 mg oral tablet  -- 1 tab(s) by mouth once a day (at bedtime), As Needed MDD:1   -- Caution federal law prohibits the transfer of this drug to any person other  than the person for whom it was prescribed.  May cause drowsiness.  Alcohol may intensify this effect.  Use care when operating dangerous machinery.    -- Indication: For sleep    Toprol- mg oral tablet, extended release  -- 1 tab(s) by mouth once a day  -- Indication: For home med    Symbicort 80 mcg-4.5 mcg/inh inhalation aerosol  -- 2 puff(s) inhaled 2 times a day  -- Indication: For home med    Spiriva 18 mcg inhalation capsule  -- 1 cap(s) inhaled once a day  -- Indication: For home med    Tagamet 200 mg oral tablet  -- 1 tab(s) by mouth 2 times a day  -- Indication: For home med    senna oral tablet  -- 2 tab(s) by mouth once a day (at bedtime) MDD:1  -- Indication: For Constipation, stool softener    docusate sodium 100 mg oral capsule  -- 1 cap(s) by mouth 2 times a day MDD:2  -- Indication: For Constipation, stool softener

## 2018-06-21 NOTE — DISCHARGE NOTE ADULT - MEDICATION SUMMARY - MEDICATIONS TO STOP TAKING
I will STOP taking the medications listed below when I get home from the hospital:    Xanax 0.5 mg oral tablet  -- 1 tab(s) by mouth 3 times a day, As Needed    oxyCODONE-acetaminophen 10 mg-325 mg oral tablet  -- 1 tab(s) by mouth every 6 hours    Xanax 1 mg oral tablet  -- 1 tab(s) by mouth once a day (at bedtime)    Cipro 250 mg oral tablet  -- 1 tab(s) by mouth every 12 hours

## 2018-06-21 NOTE — PROGRESS NOTE ADULT - SUBJECTIVE AND OBJECTIVE BOX
GYNECOLOGIC ONCOLOGY PROGRESS NOTE    POD#3    PROBLEMS:  BRCA1 gene mutation positive  Breast cancer      Pt seen and examined at bedside with Dr. Davis.     SUBJECTIVE:    Patient is without complaints.  Pain well-controlled.  Denies Nausea, Vomiting or Diarrhea.  Denies shortness of breath, chest pain or dyspnea on exertion.  Tolerating diet.    OBJECTIVE:     VITALS:  T(F): 98 (06-21-18 @ 09:04), Max: 98.9 (06-20-18 @ 20:03)  HR: 62 (06-21-18 @ 09:04) (60 - 75)  BP: 105/62 (06-21-18 @ 09:04) (71/39 - 153/84)  RR: 18 (06-21-18 @ 09:04) (16 - 18)  SpO2: 99% (06-21-18 @ 09:04) (95% - 100%)  Wt(kg): --    I&O's Summary    20 Jun 2018 07:01  -  21 Jun 2018 07:00  --------------------------------------------------------  IN: 1860 mL / OUT: 500 mL / NET: 1360 mL    21 Jun 2018 07:01  -  21 Jun 2018 11:34  --------------------------------------------------------  IN: 0 mL / OUT: 590 mL / NET: -590 mL        MEDICATIONS  (STANDING):  acetaminophen   Tablet. 650 milliGRAM(s) Oral every 6 hours  acetaminophen  IVPB. 1000 milliGRAM(s) IV Intermittent once  ALPRAZolam 1 milliGRAM(s) Oral at bedtime  buDESOnide  80 MICROgram(s)/formoterol 4.5 MICROgram(s) Inhaler 2 Puff(s) Inhalation two times a day  docusate sodium 100 milliGRAM(s) Oral two times a day  DULoxetine 60 milliGRAM(s) Oral daily  heparin  Injectable 5000 Unit(s) SubCutaneous every 8 hours  lactated ringers. 1000 milliLiter(s) (30 mL/Hr) IV Continuous <Continuous>  metoprolol succinate  milliGRAM(s) Oral daily  multiple electrolytes Injection Type 1 Bolus 1000 milliLiter(s) IV Bolus once  oxyCODONE  ER Tablet 20 milliGRAM(s) Oral <User Schedule>  pantoprazole   Suspension 40 milliGRAM(s) Oral before breakfast  pregabalin 75 milliGRAM(s) Oral <User Schedule>  tiotropium 18 MICROgram(s) Capsule 1 Capsule(s) Inhalation daily  valsartan 160 milliGRAM(s) Oral daily    MEDICATIONS  (PRN):  ALPRAZolam 0.5 milliGRAM(s) Oral three times a day PRN anxiety  diphenhydrAMINE   Injectable 25 milliGRAM(s) IV Push every 6 hours PRN Pruritus  hydrALAZINE Injectable 10 milliGRAM(s) IV Push every 6 hours PRN for SBP > 170  HYDROmorphone  Injectable 1 milliGRAM(s) IV Push every 3 hours PRN breakthrough pain  naloxone Injectable 0.1 milliGRAM(s) IV Push every 3 minutes PRN For ANY of the following changes in patient status:  A. RR LESS THAN 10 breaths per minute, B. Oxygen saturation LESS THAN 90%, C. Sedation score of 6  ondansetron Injectable 4 milliGRAM(s) IV Push every 6 hours PRN Nausea  oxyCODONE    IR 5 milliGRAM(s) Oral every 4 hours PRN Moderate Pain (4 - 6)  oxyCODONE    IR 10 milliGRAM(s) Oral every 4 hours PRN Severe Pain (7 - 10)  tiZANidine 2 milliGRAM(s) Oral every 6 hours PRN spasms      Physical Exam:  Constitutional: NAD  Pulmonary: clear to auscultation bilaterally   Cardiovascular: Regular rate and rhythm   Abdomen: soft, non-tender, non-distended, normal bowel sounds   Extremities: no lower extremity edema or calve tenderness, Tashi's sign negative.  Incisions: Clean, dry, intact.  Without signs of infection or hernia.      LABS:                        8.9    7.3   )-----------( 199      ( 21 Jun 2018 06:52 )             28.6     06-21    136  |  103  |  11.0  ----------------------------<  95  4.3   |  24.0  |  0.91    Ca    8.1<L>      21 Jun 2018 02:00  Phos  3.0     06-21  Mg     2.1     06-21

## 2018-06-21 NOTE — PROGRESS NOTE ADULT - ASSESSMENT
67 year old POD # 3  ,  s/p CORA BSO, pelvic washings and Mastectomy with breast reconstruction with RIO flaps      Problem/Plan - 1:  ·  Problem: Breast cancer.  Plan: s/p procedure above  Management per primary team   Incentive Spirometer  DVT prophylaxis   OOB as tolerated.      Problem/Plan - 2:  ·  Problem: BRCA1 gene mutation positive.  Plan: S/p CORA BSO and pelvic washings , as per GYN Onc team    Problem / Plan - 3 ; HTN - continue home meds with parameters . Hypotensive episodes since last night , given 3 L boluses - asymptomatic , tolerating diet , advised to increase PO fluid intake . Closely observe , if BP remains < 100 consider ivf ATC .     Problem / Plan - 4: COPD - stable - continue home meds     Problem / Plan -5: GERD - Protonix added     Problem / Plan -6; Acute postoperative pain on chronic pain - pain  better controlled .. Pain mgmt noted and appreciated , PCA ordered   Problem / Plan - 8: Anxiety - continue Xanax   Problem / Plan - 9: DVT prophylaxis - on Heparin as per primary team                              Opoid induced constipation prophylaxis - consider stool softener/ laxatives - increase ambulation .     Problem / Plan - 10 : Decreased eGFR - improved with ivf , likely secondary to pre renal azotemia .    [place] cath removed - voiding .

## 2018-06-21 NOTE — DISCHARGE NOTE ADULT - PLAN OF CARE
Pain control, resume activities of daily living as tolerated. Please make an appointment to see Dr. Menendez in his office in 2 weeks. Nothing per vagina x 6 weeks, do not lift anything greater than 10 lbs for 6 weeks, do not drive for 1 week or while taking pain medication. Please seek medical attention for fever greater than 100.4, excessive vaginal bleeding or pain uncontrolled by pain meds. Please make an appointment to see Dr. Menendez in his office the week of 7/2/18. Nothing per vagina x 6 weeks, do not lift anything greater than 10 lbs for 6 weeks, do not drive for 1 week or while taking pain medication. Please seek medical attention for fever greater than 100.4, excessive vaginal bleeding or pain uncontrolled by pain meds.

## 2018-06-21 NOTE — DISCHARGE NOTE ADULT - ADDITIONAL INSTRUCTIONS
Must follow up with Pain management MD by 6/26/18. Narcotic Prescriptions being dispensed only til 6/26.     Pt has been educated on risk of respiratory depression and knows to report to ER if she experiences any respiratory difficulties secondary to overlap of pain medications, antianxiety meds, sleeping meds.

## 2018-06-21 NOTE — PROGRESS NOTE ADULT - SUBJECTIVE AND OBJECTIVE BOX
Patient seen this afternoon with Dr. Menendez. Patient informed that Dr. Menendez will be out of the country starting tomorrow and that Dr. Corbin will be covering. Follow up instructions after discharge discussed.

## 2018-06-21 NOTE — DISCHARGE NOTE ADULT - HOSPITAL COURSE
67 year old female who had a history of breast cancer in 2005, S/P chemo and radiation states she started having drainage from her left breast in Feb 2017 and she had a mammo gram and sonogram which was negative and the drainage stopped later but it started again in Dec. 2017 and at that time she had MRI and which showed left nipple 4 negative cancer and the MRI also showed something in her right breast, she also got tested positive for BRCA.    Pt underwent B/L mastectomy with bilateral sentinel lymph node biopsy and breast reconstruction and CORA BSO with PW on 6/18.    Pt now POD#4: yayo Po well, lower abd pain more manageable, + void, + flatus, OOB/amb ok, no c/o pain from breast incisions.    Pt seen by pain management while inpatient and analgesics adjusted appropiately.    Pt has PRIMO x 4 - instructed on management/emptying/recording of outputs.    Pt ok to be discharged on pain management plan and to follow up outpt with Dr Fischer and Dr Shepherd and Dr Menendez.

## 2018-06-21 NOTE — DISCHARGE NOTE ADULT - NS AS ACTIVITY OBS
Walking-Outdoors allowed/Stairs allowed/No Heavy lifting/straining/Walking-Indoors allowed/Do not make important decisions/Do not drive or operate machinery

## 2018-06-21 NOTE — PROGRESS NOTE ADULT - SUBJECTIVE AND OBJECTIVE BOX
HPI:  67 year old female who had a history of breast cancer in 2005, S/P chemo and radiation. Admitted for newly diagnosed malignancy. She is now POD #3  b/l mastectomy with RIO flap, CORA/BSO, abdominoplasty.      SUBJECTIVE:  "I'm feeling better. I'm doing well with the breathing machine [incentive spirometer] and I have walked to the bathroom."  Patient reports a couple hours of sleep; appears tired. Spouse verbalizes improvement in activity tolerance reduced complaints about incisional pain. Patient refused Lyrica overnight, takes it PRN at home. Encouraged to utilize the drug twice daily during the acute post-operative period to minimize opioid requirements and maximize analgesia.  PAIN SCORE: 5/10  (VNRS)      Allergies  adhesives (Rash)  No Known Drug Allergies      THERAPY:  [ ] IV PCA Morphine		[ ] 5 mg/mL	[ ] 1 mg/mL  [X] IV PCA Hydromorphone	[ ] 5 mg/mL	[X] 1 mg/mL  [ ] IV PCA Fentanyl		[ ] 50 micrograms/mL    Demand dose _0.2mg_ lockout _6_ (minutes) Continuous Rate _0_ Total: _15.9mg_  Daily      OBJECTIVE:  Sedation Score:	[] Alert	[x] Drowsy	[ ] Arousable	[ ] Asleep	[ ] Unresponsive    Side Effects:	[X] None	[ ] Nausea	[ ] Vomiting	[ ] Pruritus  		  [ ] Weakness		[ ] Numbness	[ ] Other:      MEDICATIONS  (STANDING):  acetaminophen   Tablet. 650 milliGRAM(s) Oral every 6 hours  acetaminophen  IVPB. 1000 milliGRAM(s) IV Intermittent once  ALPRAZolam 1 milliGRAM(s) Oral at bedtime  buDESOnide  80 MICROgram(s)/formoterol 4.5 MICROgram(s) Inhaler 2 Puff(s) Inhalation two times a day  docusate sodium 100 milliGRAM(s) Oral two times a day  DULoxetine 60 milliGRAM(s) Oral daily  heparin  Injectable 5000 Unit(s) SubCutaneous every 8 hours  lactated ringers. 1000 milliLiter(s) (30 mL/Hr) IV Continuous <Continuous>  metoprolol succinate  milliGRAM(s) Oral daily  multiple electrolytes Injection Type 1 Bolus 1000 milliLiter(s) IV Bolus once  oxyCODONE  ER Tablet 20 milliGRAM(s) Oral <User Schedule>  pantoprazole   Suspension 40 milliGRAM(s) Oral before breakfast  pregabalin 75 milliGRAM(s) Oral <User Schedule>  tiotropium 18 MICROgram(s) Capsule 1 Capsule(s) Inhalation daily  valsartan 160 milliGRAM(s) Oral daily    MEDICATIONS  (PRN):  ALPRAZolam 0.5 milliGRAM(s) Oral three times a day PRN anxiety  diphenhydrAMINE   Injectable 25 milliGRAM(s) IV Push every 6 hours PRN Pruritus  hydrALAZINE Injectable 10 milliGRAM(s) IV Push every 6 hours PRN for SBP > 170  HYDROmorphone  Injectable 1 milliGRAM(s) IV Push every 3 hours PRN breakthrough pain  naloxone Injectable 0.1 milliGRAM(s) IV Push every 3 minutes PRN For ANY of the following changes in patient status:  A. RR LESS THAN 10 breaths per minute, B. Oxygen saturation LESS THAN 90%, C. Sedation score of 6  ondansetron Injectable 4 milliGRAM(s) IV Push every 6 hours PRN Nausea  oxyCODONE    IR 5 milliGRAM(s) Oral every 4 hours PRN Moderate Pain (4 - 6)  oxyCODONE    IR 10 milliGRAM(s) Oral every 4 hours PRN Severe Pain (7 - 10)  tiZANidine 2 milliGRAM(s) Oral every 6 hours PRN spasms        LABS:                        8.9    7.3   )-----------( 199      ( 21 Jun 2018 06:52 )             28.6       06-21    136  |  103  |  11.0  ----------------------------<  95  4.3   |  24.0  |  0.91    Ca    8.1<L>      21 Jun 2018 02:00  Phos  3.0     06-21  Mg     2.1     06-21

## 2018-06-21 NOTE — PROGRESS NOTE ADULT - SUBJECTIVE AND OBJECTIVE BOX
Subjective: doing well, pain controlled. Denies dizziness. Overnight received bolus of 2L for hypotension. Currently minor UOP, pending bladder scan.  Seen and examined this am by Dr hickman. taken off perfusion scanning       STATUS POST:  bilateral mastectomy with RIO reconstruction     POST OPERATIVE DAY #: 4    MEDICATIONS  (STANDING):  acetaminophen   Tablet. 650 milliGRAM(s) Oral every 6 hours  ALPRAZolam 1 milliGRAM(s) Oral at bedtime  buDESOnide  80 MICROgram(s)/formoterol 4.5 MICROgram(s) Inhaler 2 Puff(s) Inhalation two times a day  docusate sodium 100 milliGRAM(s) Oral two times a day  DULoxetine 60 milliGRAM(s) Oral daily  heparin  Injectable 5000 Unit(s) SubCutaneous every 8 hours  HYDROmorphone PCA (1 mG/mL) 30 milliLiter(s) PCA Continuous PCA Continuous  lactated ringers. 1000 milliLiter(s) (30 mL/Hr) IV Continuous <Continuous>  metoprolol succinate  milliGRAM(s) Oral daily  oxyCODONE  ER Tablet 20 milliGRAM(s) Oral every 8 hours  pantoprazole   Suspension 40 milliGRAM(s) Oral before breakfast  pregabalin 75 milliGRAM(s) Oral every 8 hours  tiotropium 18 MICROgram(s) Capsule 1 Capsule(s) Inhalation daily  tiZANidine 2 milliGRAM(s) Oral every 6 hours  valsartan 160 milliGRAM(s) Oral daily    MEDICATIONS  (PRN):  ALPRAZolam 0.5 milliGRAM(s) Oral three times a day PRN anxiety  diphenhydrAMINE   Injectable 25 milliGRAM(s) IV Push every 6 hours PRN Pruritus  hydrALAZINE Injectable 10 milliGRAM(s) IV Push every 6 hours PRN for SBP > 170  HYDROmorphone PCA (1 mG/mL) Rescue Clinician Bolus 1 milliGRAM(s) IV Push every 15 minutes PRN for Pain Scale GREATER THAN 6  naloxone Injectable 0.1 milliGRAM(s) IV Push every 3 minutes PRN For ANY of the following changes in patient status:  A. RR LESS THAN 10 breaths per minute, B. Oxygen saturation LESS THAN 90%, C. Sedation score of 6  ondansetron Injectable 4 milliGRAM(s) IV Push every 6 hours PRN Nausea      Vital Signs Last 24 Hrs  T(C): 36.7 (21 Jun 2018 09:04), Max: 37.3 (20 Jun 2018 09:12)  T(F): 98 (21 Jun 2018 09:04), Max: 99.1 (20 Jun 2018 09:12)  HR: 62 (21 Jun 2018 09:04) (60 - 103)  BP: 105/62 (21 Jun 2018 09:04) (71/39 - 166/83)  BP(mean): --  RR: 18 (21 Jun 2018 09:04) (16 - 18)  SpO2: 99% (21 Jun 2018 09:04) (95% - 100%)    Physical Exam:    Constitutional: NAD  HEENT: PERRL, EOMI  Neck: No JVD, FROM without pain  Respiratory: Breath Sounds equal & clear to auscultation, no accessory muscle use  chest: flap viable, incision c/d/i, no evidence of necrosis or hematoma. NO erythema. stitch in place marking area to check perfusion. Abd area c/d/i.   Cardiovascular: Regular rate & rhythm, S1, S2  Gastrointestinal: Soft, non-tender  Extremities: No peripheral edema, No cyanosis  Neurological: A&O x 3; without gross deficit, GCS: 15  Musculoskeletal: No joint pain, swelling, deformity, or point tenderness; no limitation of movement      LABS:                        8.9    7.3   )-----------( 199      ( 21 Jun 2018 06:52 )             28.6     06-21    136  |  103  |  11.0  ----------------------------<  95  4.3   |  24.0  |  0.91    Ca    8.1<L>      21 Jun 2018 02:00  Phos  3.0     06-21  Mg     2.1     06-21

## 2018-06-21 NOTE — DISCHARGE NOTE ADULT - CARE PROVIDERS DIRECT ADDRESSES
,DirectAddress_Unknown ,DirectAddress_Unknown,[email or web address]

## 2018-06-21 NOTE — CHART NOTE - NSCHARTNOTEFT_GEN_A_CORE
RN called for a bp of 84/52 asymptomatic, I ordered a 500cc bolus with no repsonse, repeat 71/39, I promptly came to the bedside to examine patient. She was sleeping but very arousable, To note patient is hard of hearing. I ordered stat labs, and another bolus of lR 500. Patient was in no distress, A&Ox3, answering all questions appropriately, states " I just want to go home so I get some sleep and people stop poking me" I drew the labs myself, repeat vitals sign BP79/45, HR 64, afebrile, minimal O2 requirement  Patient continues to mentate , denies dizziness, shortness of breath, chest pain, blurry vision. Will continue to monitor

## 2018-06-21 NOTE — DISCHARGE NOTE ADULT - CARE PLAN
Principal Discharge DX:	BRCA1 gene mutation positive  Goal:	Pain control, resume activities of daily living as tolerated.  Assessment and plan of treatment:	Please make an appointment to see Dr. Menendez in his office in 2 weeks. Nothing per vagina x 6 weeks, do not lift anything greater than 10 lbs for 6 weeks, do not drive for 1 week or while taking pain medication. Please seek medical attention for fever greater than 100.4, excessive vaginal bleeding or pain uncontrolled by pain meds. Principal Discharge DX:	BRCA1 gene mutation positive  Goal:	Pain control, resume activities of daily living as tolerated.  Assessment and plan of treatment:	Please make an appointment to see Dr. Menendez in his office the week of 7/2/18. Nothing per vagina x 6 weeks, do not lift anything greater than 10 lbs for 6 weeks, do not drive for 1 week or while taking pain medication. Please seek medical attention for fever greater than 100.4, excessive vaginal bleeding or pain uncontrolled by pain meds.

## 2018-06-21 NOTE — PROGRESS NOTE ADULT - ASSESSMENT
Oriented x3, NAD, sitting up in chair using incentive spirometry. Patient is drowsy, no signs of oversedation. Spouse at bedside. Oxycontin held overnight for low BPs, 09:00 /62      PLAN  Therapy to  be:	[] Continue   [x] Discontinued   [ ] Change to prn Analgesics    Documentation and Verification of current medications:  [X] Done	[ ] Not done, not eligible  [ ] Not done, reason not given

## 2018-06-21 NOTE — DISCHARGE NOTE ADULT - PATIENT PORTAL LINK FT
You can access the [email or web address], offered by [facility], by registering with the following website: [email or web address]

## 2018-06-21 NOTE — DISCHARGE NOTE ADULT - CARE PROVIDER_API CALL
Domingo Menendez), Gynecologic Oncology; Obstetrics and Gynecology  [address] [place] [address]  Phone: [phone]  Fax: [phone] Domingo Menendez), Gynecologic Oncology; Obstetrics and Gynecology  [address] [place] [address]  Phone: [phone]  Fax: [phone]    Danita Fischer), Surgery  [address] [place] [address]  Phone: [phone]  Fax: [phone]    Luther Shepherd), Plastic Surgery; Surgery; Surgery of the Hand  [address] [facility]  [address]  [place] [address]  Phone: [phone]  Fax: [phone]

## 2018-06-21 NOTE — PROGRESS NOTE ADULT - SUBJECTIVE AND OBJECTIVE BOX
Patient seen and examined . Pain better controlled , passing gas .    CC; Breast cancer , s/p b/l mastectomy and CORA/ BSO    MEDICATIONS  (STANDING):  acetaminophen   Tablet. 650 milliGRAM(s) Oral every 6 hours  ALPRAZolam 1 milliGRAM(s) Oral at bedtime  buDESOnide  80 MICROgram(s)/formoterol 4.5 MICROgram(s) Inhaler 2 Puff(s) Inhalation two times a day  docusate sodium 100 milliGRAM(s) Oral two times a day  DULoxetine 60 milliGRAM(s) Oral daily  heparin  Injectable 5000 Unit(s) SubCutaneous every 8 hours  lactated ringers. 1000 milliLiter(s) (30 mL/Hr) IV Continuous <Continuous>  metoprolol succinate  milliGRAM(s) Oral daily  multiple electrolytes Injection Type 1 Bolus 1000 milliLiter(s) IV Bolus once  oxyCODONE  ER Tablet 20 milliGRAM(s) Oral <User Schedule>  pantoprazole   Suspension 40 milliGRAM(s) Oral before breakfast  pregabalin 75 milliGRAM(s) Oral <User Schedule>  tiotropium 18 MICROgram(s) Capsule 1 Capsule(s) Inhalation daily  valsartan 160 milliGRAM(s) Oral daily    MEDICATIONS  (PRN):  ALPRAZolam 0.5 milliGRAM(s) Oral three times a day PRN anxiety  diphenhydrAMINE   Injectable 25 milliGRAM(s) IV Push every 6 hours PRN Pruritus  hydrALAZINE Injectable 10 milliGRAM(s) IV Push every 6 hours PRN for SBP > 170  HYDROmorphone  Injectable 1 milliGRAM(s) IV Push every 3 hours PRN breakthrough pain  naloxone Injectable 0.1 milliGRAM(s) IV Push every 3 minutes PRN For ANY of the following changes in patient status:  A. RR LESS THAN 10 breaths per minute, B. Oxygen saturation LESS THAN 90%, C. Sedation score of 6  ondansetron Injectable 4 milliGRAM(s) IV Push every 6 hours PRN Nausea  oxyCODONE    IR 5 milliGRAM(s) Oral every 4 hours PRN Moderate Pain (4 - 6)  oxyCODONE    IR 10 milliGRAM(s) Oral every 4 hours PRN Severe Pain (7 - 10)  tiZANidine 2 milliGRAM(s) Oral every 6 hours PRN spasms      LABS:                          8.9    7.3   )-----------( 199      ( 21 Jun 2018 06:52 )             28.6     06-21    136  |  103  |  11.0  ----------------------------<  95  4.3   |  24.0  |  0.91    Ca    8.1<L>      21 Jun 2018 02:00  Phos  3.0     06-21  Mg     2.1     06-21          REVIEW OF SYSTEMS:    CONSTITUTIONAL: No fever, weight loss, or fatigue  EYES: No eye pain, visual disturbances, or discharge  ENMT:  No difficulty hearing, tinnitus, vertigo; No sinus or throat pain  NECK: No pain or stiffness  RESPIRATORY: No cough, wheezing, chills or hemoptysis; No shortness of breath  CARDIOVASCULAR: No chest pain, palpitations, dizziness, or leg swelling  GASTROINTESTINAL: No abdominal or epigastric pain. No nausea, vomiting, or hematemesis; No diarrhea or constipation. No melena or hematochezia.  GENITOURINARY: No dysuria, frequency, hematuria, or incontinence  NEUROLOGICAL: No headaches, memory loss, loss of strength, numbness, or tremors  SKIN: No itching, burning, rashes, or lesions   LYMPH NODES: No enlarged glands  ENDOCRINE: No heat or cold intolerance; No hair loss  MUSCULOSKELETAL:   PSYCHIATRIC: No depression, anxiety, mood swings, or difficulty sleeping  HEME/LYMPH: No easy bruising, or bleeding gums  ALLERGY AND IMMUNOLOGIC: No hives or eczema    Vital Signs Last 24 Hrs  T(C): 36.9 (21 Jun 2018 12:30), Max: 37.2 (20 Jun 2018 20:03)  T(F): 98.5 (21 Jun 2018 12:30), Max: 98.9 (20 Jun 2018 20:03)  HR: 70 (21 Jun 2018 12:30) (60 - 77)  BP: 91/57 (21 Jun 2018 12:30) (70/45 - 153/84)  BP(mean): --  RR: 18 (21 Jun 2018 09:04) (16 - 18)  SpO2: 100% (21 Jun 2018 12:30) (95% - 100%)  PHYSICAL EXAM:    GENERAL: NAD, well-groomed, well-developed  HEAD:  Atraumatic, Normocephalic  EYES: EOMI, PERRLA, conjunctiva and sclera clear  NECK: Supple, No JVD, Normal thyroid  Respiratory: Breath Sounds equal & clear to auscultation, no accessory muscle use  chest: flap viable, incision c/d/i, no evidence of necrosis or hematoma. NO erythema  NERVOUS SYSTEM:  Alert & Oriented X3, no focal deficit  CHEST/LUNG: CTA b/l ,  no  rales, rhonchi, wheezing, or rubs  HEART: Regular rate and rhythm; No murmurs, rubs, or gallops  ABDOMEN: Soft, Nontender, Nondistended; Bowel sounds present , surgical site C/D/I   EXTREMITIES:  2+ Peripheral Pulses, No clubbing, cyanosis, or edema  LYMPH: No lymphadenopathy noted  SKIN: No rashes or lesions

## 2018-06-22 VITALS
DIASTOLIC BLOOD PRESSURE: 83 MMHG | HEART RATE: 72 BPM | OXYGEN SATURATION: 98 % | TEMPERATURE: 97 F | SYSTOLIC BLOOD PRESSURE: 142 MMHG | RESPIRATION RATE: 20 BRPM

## 2018-06-22 LAB
HCT VFR BLD CALC: 28.6 % — LOW (ref 37–47)
HGB BLD-MCNC: 9.2 G/DL — LOW (ref 12–16)
MCHC RBC-ENTMCNC: 28.8 PG — SIGNIFICANT CHANGE UP (ref 27–31)
MCHC RBC-ENTMCNC: 32.2 G/DL — SIGNIFICANT CHANGE UP (ref 32–36)
MCV RBC AUTO: 89.4 FL — SIGNIFICANT CHANGE UP (ref 81–99)
PLATELET # BLD AUTO: 239 K/UL — SIGNIFICANT CHANGE UP (ref 150–400)
RBC # BLD: 3.2 M/UL — LOW (ref 4.4–5.2)
RBC # FLD: 13.3 % — SIGNIFICANT CHANGE UP (ref 11–15.6)
WBC # BLD: 6.5 K/UL — SIGNIFICANT CHANGE UP (ref 4.8–10.8)
WBC # FLD AUTO: 6.5 K/UL — SIGNIFICANT CHANGE UP (ref 4.8–10.8)

## 2018-06-22 PROCEDURE — 88305 TISSUE EXAM BY PATHOLOGIST: CPT

## 2018-06-22 PROCEDURE — 84100 ASSAY OF PHOSPHORUS: CPT

## 2018-06-22 PROCEDURE — 88333 PATH CONSLTJ SURG CYTO XM 1: CPT

## 2018-06-22 PROCEDURE — 38792 RA TRACER ID OF SENTINL NODE: CPT

## 2018-06-22 PROCEDURE — 80048 BASIC METABOLIC PNL TOTAL CA: CPT

## 2018-06-22 PROCEDURE — 94664 DEMO&/EVAL PT USE INHALER: CPT

## 2018-06-22 PROCEDURE — 83605 ASSAY OF LACTIC ACID: CPT

## 2018-06-22 PROCEDURE — 82962 GLUCOSE BLOOD TEST: CPT

## 2018-06-22 PROCEDURE — 88112 CYTOPATH CELL ENHANCE TECH: CPT

## 2018-06-22 PROCEDURE — 86901 BLOOD TYPING SEROLOGIC RH(D): CPT

## 2018-06-22 PROCEDURE — A9541: CPT

## 2018-06-22 PROCEDURE — 84132 ASSAY OF SERUM POTASSIUM: CPT

## 2018-06-22 PROCEDURE — 36415 COLL VENOUS BLD VENIPUNCTURE: CPT

## 2018-06-22 PROCEDURE — 85027 COMPLETE CBC AUTOMATED: CPT

## 2018-06-22 PROCEDURE — 86900 BLOOD TYPING SEROLOGIC ABO: CPT

## 2018-06-22 PROCEDURE — 99233 SBSQ HOSP IP/OBS HIGH 50: CPT

## 2018-06-22 PROCEDURE — 88307 TISSUE EXAM BY PATHOLOGIST: CPT

## 2018-06-22 PROCEDURE — 94760 N-INVAS EAR/PLS OXIMETRY 1: CPT

## 2018-06-22 PROCEDURE — 86850 RBC ANTIBODY SCREEN: CPT

## 2018-06-22 PROCEDURE — 94640 AIRWAY INHALATION TREATMENT: CPT

## 2018-06-22 PROCEDURE — 83735 ASSAY OF MAGNESIUM: CPT

## 2018-06-22 PROCEDURE — 88309 TISSUE EXAM BY PATHOLOGIST: CPT

## 2018-06-22 RX ORDER — SENNA PLUS 8.6 MG/1
2 TABLET ORAL AT BEDTIME
Qty: 0 | Refills: 0 | Status: DISCONTINUED | OUTPATIENT
Start: 2018-06-22 | End: 2018-06-22

## 2018-06-22 RX ORDER — ACETAMINOPHEN 500 MG
650 TABLET ORAL EVERY 6 HOURS
Qty: 0 | Refills: 0 | Status: DISCONTINUED | OUTPATIENT
Start: 2018-06-22 | End: 2018-06-22

## 2018-06-22 RX ORDER — OXYCODONE HYDROCHLORIDE 5 MG/1
1 TABLET ORAL
Qty: 8 | Refills: 0 | OUTPATIENT
Start: 2018-06-22 | End: 2018-06-25

## 2018-06-22 RX ORDER — ZOLPIDEM TARTRATE 10 MG/1
1 TABLET ORAL
Qty: 4 | Refills: 0
Start: 2018-06-22

## 2018-06-22 RX ORDER — ALPRAZOLAM 0.25 MG
1 TABLET ORAL
Qty: 4 | Refills: 0
Start: 2018-06-22

## 2018-06-22 RX ORDER — SENNA PLUS 8.6 MG/1
2 TABLET ORAL
Qty: 14 | Refills: 1
Start: 2018-06-22

## 2018-06-22 RX ORDER — CIPROFLOXACIN LACTATE 400MG/40ML
1 VIAL (ML) INTRAVENOUS
Qty: 0 | Refills: 0 | COMMUNITY

## 2018-06-22 RX ORDER — ALPRAZOLAM 0.25 MG
1 TABLET ORAL
Qty: 0 | Refills: 0 | COMMUNITY

## 2018-06-22 RX ORDER — IBUPROFEN 200 MG
1 TABLET ORAL
Qty: 21 | Refills: 1
Start: 2018-06-22 | End: 2018-07-05

## 2018-06-22 RX ORDER — DOCUSATE SODIUM 100 MG
1 CAPSULE ORAL
Qty: 14 | Refills: 1
Start: 2018-06-22

## 2018-06-22 RX ORDER — OXYCODONE HYDROCHLORIDE 5 MG/1
1 TABLET ORAL
Qty: 16 | Refills: 0
Start: 2018-06-22 | End: 2018-06-25

## 2018-06-22 RX ADMIN — OXYCODONE HYDROCHLORIDE 10 MILLIGRAM(S): 5 TABLET ORAL at 11:54

## 2018-06-22 RX ADMIN — OXYCODONE HYDROCHLORIDE 10 MILLIGRAM(S): 5 TABLET ORAL at 08:30

## 2018-06-22 RX ADMIN — OXYCODONE HYDROCHLORIDE 10 MILLIGRAM(S): 5 TABLET ORAL at 03:16

## 2018-06-22 RX ADMIN — HEPARIN SODIUM 5000 UNIT(S): 5000 INJECTION INTRAVENOUS; SUBCUTANEOUS at 12:00

## 2018-06-22 RX ADMIN — Medication 100 MILLIGRAM(S): at 05:31

## 2018-06-22 RX ADMIN — BUDESONIDE AND FORMOTEROL FUMARATE DIHYDRATE 2 PUFF(S): 160; 4.5 AEROSOL RESPIRATORY (INHALATION) at 09:31

## 2018-06-22 RX ADMIN — OXYCODONE HYDROCHLORIDE 20 MILLIGRAM(S): 5 TABLET ORAL at 12:00

## 2018-06-22 RX ADMIN — OXYCODONE HYDROCHLORIDE 10 MILLIGRAM(S): 5 TABLET ORAL at 13:00

## 2018-06-22 RX ADMIN — TIOTROPIUM BROMIDE 1 CAPSULE(S): 18 CAPSULE ORAL; RESPIRATORY (INHALATION) at 09:31

## 2018-06-22 RX ADMIN — PANTOPRAZOLE SODIUM 40 MILLIGRAM(S): 20 TABLET, DELAYED RELEASE ORAL at 05:32

## 2018-06-22 RX ADMIN — HYDROMORPHONE HYDROCHLORIDE 1 MILLIGRAM(S): 2 INJECTION INTRAMUSCULAR; INTRAVENOUS; SUBCUTANEOUS at 05:38

## 2018-06-22 RX ADMIN — Medication 100 MILLIGRAM(S): at 05:30

## 2018-06-22 RX ADMIN — VALSARTAN 160 MILLIGRAM(S): 80 TABLET ORAL at 05:31

## 2018-06-22 RX ADMIN — Medication 75 MILLIGRAM(S): at 11:59

## 2018-06-22 RX ADMIN — OXYCODONE HYDROCHLORIDE 10 MILLIGRAM(S): 5 TABLET ORAL at 07:35

## 2018-06-22 RX ADMIN — DULOXETINE HYDROCHLORIDE 60 MILLIGRAM(S): 30 CAPSULE, DELAYED RELEASE ORAL at 11:54

## 2018-06-22 NOTE — CHART NOTE - NSCHARTNOTEFT_GEN_A_CORE
Upon Nutritional Assessment by the Registered Dietitian your patient was determined to meet criteria / has evidence of the following diagnosis/diagnoses:          [ ]  Mild Protein Calorie Malnutrition        [X ]  Moderate Protein Calorie Malnutrition        [ ] Severe Protein Calorie Malnutrition        [ ] Unspecified Protein Calorie Malnutrition        [ ] Underweight / BMI <19        [ ] Morbid Obesity / BMI > 40      Findings as based on:  •  Comprehensive nutrition assessment and consultation  reported unintentional 9% wt loss and prolonged poor po intake x 1.5 months      Treatment:    The following diet has been recommended: Regular diet      PROVIDER Section:     By signing this assessment you are acknowledging and agree with the diagnosis/diagnoses assigned by the Registered Dietitian    Comments:

## 2018-06-22 NOTE — PROGRESS NOTE ADULT - ASSESSMENT
A&Ox3, NAD, sitting up in chair. Less drowsiness compared to 06/21; appears more rested. Presently no signs of intense pain.

## 2018-06-22 NOTE — PROGRESS NOTE ADULT - PROBLEM SELECTOR PLAN 2
1. As noted above  2. Increase Lyrica 75mg PO to q8hr dosing.  3. Continue Cymbalta as ordered.
s/p procedure above  Management per primary team     Case discussed with Dr. Menendez
As noted above
Care per primary team, gyn onc will continue to follow     Case discussed with Dr. Menendez
Patient will follow up with her outpatient pain management provider for ongoing management.   Pain management signed off.  [phone]
S/p CORA BSO and pelvic washings   Recovering well from gyn onc perspective  Continue current diet  Ok to remove Santana from gyn perspective     Will discuss case with Dr. Menendez

## 2018-06-22 NOTE — PROGRESS NOTE ADULT - SUBJECTIVE AND OBJECTIVE BOX
Patient seen and examined June 20th and today for a follow up.    She is s/p bilateral mastectomy with bilateral sentinel lymph node biopsy.    She is A&Ox 3 NAD (with moderate pain)   Breast/Chest: Viable bilateral rylan flap; no evidence of bleeding or hematoma. No erythema.  Abd: Incision, clean, dry and intact.

## 2018-06-22 NOTE — DIETITIAN INITIAL EVALUATION ADULT. - OTHER INFO
Pt admitted for breast cancer, s/p mastectomy, breast reconstruction, TAHBSO. Pt admits to limited PO intake secondary to stress and lack of appetite. Pt states her nausea has decreased since hospitalization. Pt states she will be undergoing chemotherapy soon. admits to only eating one meal per day on average.

## 2018-06-22 NOTE — PROGRESS NOTE ADULT - PROBLEM SELECTOR PROBLEM 1
BRCA1 gene mutation positive
Pain at surgical incision
BRCA1 gene mutation positive
Breast cancer
Breast cancer
Pain at surgical incision
Pain at surgical incision
Breast cancer

## 2018-06-22 NOTE — PROGRESS NOTE ADULT - PROBLEM SELECTOR PROBLEM 2
Breast cancer
Chronic back pain, unspecified back location, unspecified back pain laterality
BRCA1 gene mutation positive
Breast cancer
Chronic back pain, unspecified back location, unspecified back pain laterality
Chronic back pain, unspecified back location, unspecified back pain laterality

## 2018-06-22 NOTE — PROGRESS NOTE ADULT - ASSESSMENT
68 yo postmenopausal female with bilateral breast cancer and BRCA 1 mutation s/p bilateral mastectomy with bilateral SLNB, b/l salping0-oophorectomy and RIO reconstruction. Pain control as per pain specialist. She is doing well from a surgical standpoint.  1. Pain control  2. VNS   3. DC planning  4. Care as per plastic surgery- Dr. Whitfield and Dr. Shepherd

## 2018-06-22 NOTE — PROGRESS NOTE ADULT - PROBLEM SELECTOR PLAN 1
S/p procedure above. Recovering well from gyn standpoint. Will continue to follow. Gyn dc and follow up instructions discussed with patient.

## 2018-06-22 NOTE — PROGRESS NOTE ADULT - SUBJECTIVE AND OBJECTIVE BOX
67 year old female who had a history of breast cancer in 2005, S/P chemo and radiation. Admitted for newly diagnosed malignancy. She is now POD #3  b/l mastectomy with RIO flap, CORA/BSO, abdominoplasty.    Allergies  adhesives (Rash)  No Known Drug Allergies      Verbal report:  "I have a lot of pain at night when I try to get up to the bathroom."  Patient reports adequate analgesia through out the day, but having difficulty getting out of bed at night. Per MAR, patient utilize pain meds about every 4 hours while awake, but had a 7hour gap between 8pm and 3am last night.    Vital Signs Last 24 Hrs  T(C): 36.8 (22 Jun 2018 07:57), Max: 37.1 (22 Jun 2018 00:40)  T(F): 98.3 (22 Jun 2018 07:57), Max: 98.8 (22 Jun 2018 00:40)  HR: 83 (22 Jun 2018 09:32) (70 - 83)  BP: 157/83 (22 Jun 2018 07:57) (91/57 - 157/83)  BP(mean): --  RR: 20 (22 Jun 2018 07:57) (18 - 20)  SpO2: 96% (22 Jun 2018 09:32) (96% - 100%)    MEDICATIONS  (STANDING):  ALPRAZolam 1 milliGRAM(s) Oral at bedtime  buDESOnide  80 MICROgram(s)/formoterol 4.5 MICROgram(s) Inhaler 2 Puff(s) Inhalation two times a day  docusate sodium 100 milliGRAM(s) Oral two times a day  DULoxetine 60 milliGRAM(s) Oral daily  heparin  Injectable 5000 Unit(s) SubCutaneous every 8 hours  lactated ringers. 1000 milliLiter(s) (30 mL/Hr) IV Continuous <Continuous>  metoprolol succinate  milliGRAM(s) Oral daily  oxyCODONE  ER Tablet 20 milliGRAM(s) Oral <User Schedule>  pantoprazole   Suspension 40 milliGRAM(s) Oral before breakfast  pregabalin 75 milliGRAM(s) Oral <User Schedule>  tiotropium 18 MICROgram(s) Capsule 1 Capsule(s) Inhalation daily  valsartan 160 milliGRAM(s) Oral daily    MEDICATIONS  (PRN):  acetaminophen   Tablet. 650 milliGRAM(s) Oral every 6 hours PRN Mild Pain (1 - 3)/ Headache  ALPRAZolam 0.5 milliGRAM(s) Oral three times a day PRN anxiety  diphenhydrAMINE   Injectable 25 milliGRAM(s) IV Push every 6 hours PRN Pruritus  hydrALAZINE Injectable 10 milliGRAM(s) IV Push every 6 hours PRN for SBP > 170  HYDROmorphone  Injectable 1 milliGRAM(s) IV Push every 3 hours PRN breakthrough pain  naloxone Injectable 0.1 milliGRAM(s) IV Push every 3 minutes PRN For ANY of the following changes in patient status:  A. RR LESS THAN 10 breaths per minute, B. Oxygen saturation LESS THAN 90%, C. Sedation score of 6  ondansetron Injectable 4 milliGRAM(s) IV Push every 6 hours PRN Nausea  oxyCODONE    IR 5 milliGRAM(s) Oral every 4 hours PRN Moderate Pain (4 - 6)  oxyCODONE    IR 10 milliGRAM(s) Oral every 4 hours PRN Severe Pain (7 - 10)  tiZANidine 2 milliGRAM(s) Oral every 6 hours PRN spasms

## 2018-06-22 NOTE — PROGRESS NOTE ADULT - PROBLEM SELECTOR PLAN 1
1. Continue Oxycontin BID.   - Consider increasing dose to 30mg PO to improve duration of analgesia.  2. Continue Oxycodone IR PRN as ordered  - Encourage regular use prior to onset of severe breakthrough pain  3. Continue Tylenol, Lyrica and Tizanidine as non-opioid adjuncts  4. Encourage PT; ambulation as tolerated

## 2018-06-22 NOTE — PROGRESS NOTE ADULT - ASSESSMENT
67 year old POD 4 s/p CORA BSO, pelvic washings, mastectomy and reconstruction with RIO flaps. Recovering well.

## 2018-06-22 NOTE — PROGRESS NOTE ADULT - PROVIDER SPECIALTY LIST ADULT
Gyn Onc
Hospitalist
Pain Medicine
Surgery
Gyn Onc
Pain Medicine

## 2018-06-22 NOTE — PROGRESS NOTE ADULT - ASSESSMENT
67 year old POD # 4 ,  s/p CORA BSO, pelvic washings and  b/l Mastectomy with breast reconstruction with RIO flaps      Problem/Plan - 1:  ·  Problem: Breast cancer.  Plan: s/p procedure above  Management per primary team   Incentive Spirometer  DVT prophylaxis   OOB as tolerated.      Problem/Plan - 2:  ·  Problem: BRCA1 gene mutation positive.  Plan: S/p CORA BSO and pelvic washings , as per GYN Onc team    Problem / Plan - 3 ; HTN - continue home meds with parameters . Hypotensive episode resolved with boluses .    Problem / Plan - 4: COPD - stable - continue home meds     Problem / Plan -5: GERD - Protonix added     Problem / Plan -6; Acute postoperative pain on chronic pain - pain  better controlled .. Pain mgmt noted and appreciated , PCA d/kian   Problem / Plan - 8: Anxiety - continue Xanax   Problem / Plan - 9: DVT prophylaxis - on Heparin as per primary team                              Opoid induced constipation prophylaxis. No BM yet , consider to add Senna / Miralax     Problem / Plan - 10 : Decreased eGFR - improved with ivf , likely secondary to pre renal azotemia .   Problem / Plan -11: Poor oral intake - Nutrition consult noted and appreciated , patient refused supplements    Santana cath removed - voiding .

## 2018-06-22 NOTE — PROGRESS NOTE ADULT - SUBJECTIVE AND OBJECTIVE BOX
GYNECOLOGIC ONCOLOGY PROGRESS NOTE    POD#4    PROBLEMS:  Chronic back pain, unspecified back location, unspecified back pain laterality  Pain at surgical incision  BRCA1 gene mutation positive  Breast cancer      Pt seen and examined at bedside with Dr. Davis.     SUBJECTIVE:    Patient is without complaints.  Pain well-controlled.  Denies Nausea, Vomiting or Diarrhea.  Denies shortness of breath, chest pain or dyspnea on exertion.  Tolerating diet, voiding regularly.     OBJECTIVE:     VITALS:  T(F): 98.3 (06-22-18 @ 07:57), Max: 98.8 (06-22-18 @ 00:40)  HR: 70 (06-22-18 @ 07:57) (62 - 78)  BP: 157/83 (06-22-18 @ 07:57) (70/45 - 157/83)  RR: 20 (06-22-18 @ 07:57) (18 - 20)  SpO2: 99% (06-22-18 @ 07:57) (97% - 100%)  Wt(kg): --    I&O's Summary    21 Jun 2018 07:01  -  22 Jun 2018 07:00  --------------------------------------------------------  IN: 0 mL / OUT: 1760 mL / NET: -1760 mL        MEDICATIONS  (STANDING):  ALPRAZolam 1 milliGRAM(s) Oral at bedtime  buDESOnide  80 MICROgram(s)/formoterol 4.5 MICROgram(s) Inhaler 2 Puff(s) Inhalation two times a day  docusate sodium 100 milliGRAM(s) Oral two times a day  DULoxetine 60 milliGRAM(s) Oral daily  heparin  Injectable 5000 Unit(s) SubCutaneous every 8 hours  lactated ringers. 1000 milliLiter(s) (30 mL/Hr) IV Continuous <Continuous>  metoprolol succinate  milliGRAM(s) Oral daily  oxyCODONE  ER Tablet 20 milliGRAM(s) Oral <User Schedule>  pantoprazole   Suspension 40 milliGRAM(s) Oral before breakfast  pregabalin 75 milliGRAM(s) Oral <User Schedule>  tiotropium 18 MICROgram(s) Capsule 1 Capsule(s) Inhalation daily  valsartan 160 milliGRAM(s) Oral daily    MEDICATIONS  (PRN):  ALPRAZolam 0.5 milliGRAM(s) Oral three times a day PRN anxiety  diphenhydrAMINE   Injectable 25 milliGRAM(s) IV Push every 6 hours PRN Pruritus  hydrALAZINE Injectable 10 milliGRAM(s) IV Push every 6 hours PRN for SBP > 170  HYDROmorphone  Injectable 1 milliGRAM(s) IV Push every 3 hours PRN breakthrough pain  naloxone Injectable 0.1 milliGRAM(s) IV Push every 3 minutes PRN For ANY of the following changes in patient status:  A. RR LESS THAN 10 breaths per minute, B. Oxygen saturation LESS THAN 90%, C. Sedation score of 6  ondansetron Injectable 4 milliGRAM(s) IV Push every 6 hours PRN Nausea  oxyCODONE    IR 5 milliGRAM(s) Oral every 4 hours PRN Moderate Pain (4 - 6)  oxyCODONE    IR 10 milliGRAM(s) Oral every 4 hours PRN Severe Pain (7 - 10)  tiZANidine 2 milliGRAM(s) Oral every 6 hours PRN spasms      Physical Exam:  Constitutional: NAD  Pulmonary: clear to auscultation bilaterally   Cardiovascular: Regular rate and rhythm   Abdomen: soft, non-tender, non-distended, normal bowel sounds   Extremities: no lower extremity edema or calve tenderness, Tashi's sign negative.  Abdominal Incision: Clean, dry, intact.  Without signs of infection or hernia.      LABS:                        9.2    6.5   )-----------( 239      ( 22 Jun 2018 07:04 )             28.6     06-21    136  |  103  |  11.0  ----------------------------<  95  4.3   |  24.0  |  0.91    Ca    8.1<L>      21 Jun 2018 02:00  Phos  3.0     06-21  Mg     2.1     06-21

## 2018-06-22 NOTE — PROGRESS NOTE ADULT - SUBJECTIVE AND OBJECTIVE BOX
Patient seen and examined . s/p bilateral mastectomy with bilateral SLNB, b/l salping0-oophorectomy and RIO reconstruction.      CC : B/L Breast cancer , s/p b/l mastectomy with CORA/ BSO , no BM for 3-4 days , pain well controlled , poor appetite     MEDICATIONS  (STANDING):  ALPRAZolam 1 milliGRAM(s) Oral at bedtime  buDESOnide  80 MICROgram(s)/formoterol 4.5 MICROgram(s) Inhaler 2 Puff(s) Inhalation two times a day  docusate sodium 100 milliGRAM(s) Oral two times a day  DULoxetine 60 milliGRAM(s) Oral daily  heparin  Injectable 5000 Unit(s) SubCutaneous every 8 hours  lactated ringers. 1000 milliLiter(s) (30 mL/Hr) IV Continuous <Continuous>  metoprolol succinate  milliGRAM(s) Oral daily  oxyCODONE  ER Tablet 20 milliGRAM(s) Oral <User Schedule>  pantoprazole   Suspension 40 milliGRAM(s) Oral before breakfast  pregabalin 75 milliGRAM(s) Oral <User Schedule>  senna 2 Tablet(s) Oral at bedtime  tiotropium 18 MICROgram(s) Capsule 1 Capsule(s) Inhalation daily  valsartan 160 milliGRAM(s) Oral daily    MEDICATIONS  (PRN):  acetaminophen   Tablet. 650 milliGRAM(s) Oral every 6 hours PRN Mild Pain (1 - 3)/ Headache  ALPRAZolam 0.5 milliGRAM(s) Oral three times a day PRN anxiety  diphenhydrAMINE   Injectable 25 milliGRAM(s) IV Push every 6 hours PRN Pruritus  hydrALAZINE Injectable 10 milliGRAM(s) IV Push every 6 hours PRN for SBP > 170  HYDROmorphone  Injectable 1 milliGRAM(s) IV Push every 3 hours PRN breakthrough pain  naloxone Injectable 0.1 milliGRAM(s) IV Push every 3 minutes PRN For ANY of the following changes in patient status:  A. RR LESS THAN 10 breaths per minute, B. Oxygen saturation LESS THAN 90%, C. Sedation score of 6  ondansetron Injectable 4 milliGRAM(s) IV Push every 6 hours PRN Nausea  oxyCODONE    IR 5 milliGRAM(s) Oral every 4 hours PRN Moderate Pain (4 - 6)  oxyCODONE    IR 10 milliGRAM(s) Oral every 4 hours PRN Severe Pain (7 - 10)  tiZANidine 2 milliGRAM(s) Oral every 6 hours PRN spasms      LABS:                          9.2    6.5   )-----------( 239      ( 22 Jun 2018 07:04 )             28.6     06-21    136  |  103  |  11.0  ----------------------------<  95  4.3   |  24.0  |  0.91    Ca    8.1<L>      21 Jun 2018 02:00  Phos  3.0     06-21  Mg     2.1     06-21      REVIEW OF SYSTEMS:    As above , all other systems reviewed and are negative     Vital Signs Last 24 Hrs  T(C): 36.8 (22 Jun 2018 07:57), Max: 37.1 (22 Jun 2018 00:40)  T(F): 98.3 (22 Jun 2018 07:57), Max: 98.8 (22 Jun 2018 00:40)  HR: 83 (22 Jun 2018 09:32) (70 - 83)  BP: 157/83 (22 Jun 2018 07:57) (99/63 - 157/83)  BP(mean): --  RR: 20 (22 Jun 2018 07:57) (18 - 20)  SpO2: 96% (22 Jun 2018 09:32) (96% - 100%)    PHYSICAL EXAM:    GENERAL: NAD, well-groomed, well-developed  HEAD:  Atraumatic, Normocephalic  EYES: EOMI, PERRLA, conjunctiva and sclera clear  NECK: Supple, No JVD, Normal thyroid  Breast : no erythema , no drainage , flaps visible   NERVOUS SYSTEM:  Alert & Oriented X3, no focal deficit  CHEST/LUNG: CTA b/l ,  no  rales, rhonchi, wheezing, or rubs  HEART: Regular rate and rhythm; No murmurs, rubs, or gallops  ABDOMEN: Soft, Nontender, Nondistended; Bowel sounds present , surgical site clean and dry , 2 drains on each low abd side +   EXTREMITIES:  2+ Peripheral Pulses, No clubbing, cyanosis, or edema  LYMPH: No lymphadenopathy noted  SKIN: No rashes or lesions

## 2018-06-25 PROBLEM — L53.9 REDNESS: Status: ACTIVE | Noted: 2018-06-25

## 2018-06-25 LAB — SURGICAL PATHOLOGY FINAL REPORT - CH: SIGNIFICANT CHANGE UP

## 2018-07-02 ENCOUNTER — APPOINTMENT (OUTPATIENT)
Dept: SURGERY | Facility: CLINIC | Age: 68
End: 2018-07-02
Payer: MEDICARE

## 2018-07-02 VITALS
OXYGEN SATURATION: 97 % | SYSTOLIC BLOOD PRESSURE: 133 MMHG | HEIGHT: 64 IN | DIASTOLIC BLOOD PRESSURE: 73 MMHG | HEART RATE: 77 BPM | TEMPERATURE: 98 F

## 2018-07-02 DIAGNOSIS — L53.9 ERYTHEMATOUS CONDITION, UNSPECIFIED: ICD-10-CM

## 2018-07-02 PROCEDURE — 99024 POSTOP FOLLOW-UP VISIT: CPT

## 2018-07-02 RX ORDER — ZOLPIDEM TARTRATE 10 MG/1
10 TABLET ORAL
Qty: 30 | Refills: 0 | Status: DISCONTINUED | COMMUNITY
Start: 2018-03-23 | End: 2018-07-02

## 2018-07-02 RX ORDER — VALSARTAN AND HYDROCHLOROTHIAZIDE 160; 12.5 MG/1; MG/1
160-12.5 TABLET, FILM COATED ORAL
Refills: 0 | Status: DISCONTINUED | COMMUNITY

## 2018-07-09 ENCOUNTER — APPOINTMENT (OUTPATIENT)
Dept: PLASTIC SURGERY | Facility: CLINIC | Age: 68
End: 2018-07-09
Payer: MEDICARE

## 2018-07-09 VITALS
HEART RATE: 78 BPM | DIASTOLIC BLOOD PRESSURE: 81 MMHG | SYSTOLIC BLOOD PRESSURE: 128 MMHG | RESPIRATION RATE: 16 BRPM | TEMPERATURE: 97.8 F

## 2018-07-09 PROCEDURE — 99024 POSTOP FOLLOW-UP VISIT: CPT

## 2018-07-23 ENCOUNTER — OUTPATIENT (OUTPATIENT)
Dept: OUTPATIENT SERVICES | Facility: HOSPITAL | Age: 68
LOS: 1 days | Discharge: HOME | End: 2018-07-23
Payer: MEDICARE

## 2018-07-23 DIAGNOSIS — Z98.890 OTHER SPECIFIED POSTPROCEDURAL STATES: Chronic | ICD-10-CM

## 2018-07-23 DIAGNOSIS — J38.1 POLYP OF VOCAL CORD AND LARYNX: Chronic | ICD-10-CM

## 2018-07-23 DIAGNOSIS — C50.919 MALIGNANT NEOPLASM OF UNSPECIFIED SITE OF UNSPECIFIED FEMALE BREAST: ICD-10-CM

## 2018-07-23 PROBLEM — J44.9 CHRONIC OBSTRUCTIVE PULMONARY DISEASE, UNSPECIFIED: Chronic | Status: ACTIVE | Noted: 2018-06-11

## 2018-07-23 PROBLEM — I10 ESSENTIAL (PRIMARY) HYPERTENSION: Chronic | Status: ACTIVE | Noted: 2018-06-11

## 2018-07-23 PROBLEM — M54.9 DORSALGIA, UNSPECIFIED: Chronic | Status: ACTIVE | Noted: 2018-06-11

## 2018-07-23 PROBLEM — K21.9 GASTRO-ESOPHAGEAL REFLUX DISEASE WITHOUT ESOPHAGITIS: Chronic | Status: ACTIVE | Noted: 2018-06-11

## 2018-07-30 ENCOUNTER — APPOINTMENT (OUTPATIENT)
Dept: HEMATOLOGY ONCOLOGY | Facility: CLINIC | Age: 68
End: 2018-07-30
Payer: MEDICARE

## 2018-07-30 VITALS
HEIGHT: 64 IN | OXYGEN SATURATION: 98 % | WEIGHT: 151.12 LBS | TEMPERATURE: 97.8 F | SYSTOLIC BLOOD PRESSURE: 156 MMHG | DIASTOLIC BLOOD PRESSURE: 78 MMHG | BODY MASS INDEX: 25.8 KG/M2 | HEART RATE: 68 BPM

## 2018-07-30 PROCEDURE — 99214 OFFICE O/P EST MOD 30 MIN: CPT

## 2018-07-30 RX ORDER — CIPROFLOXACIN HYDROCHLORIDE 500 MG/1
500 TABLET, FILM COATED ORAL
Qty: 10 | Refills: 0 | Status: DISCONTINUED | COMMUNITY
Start: 2018-06-25 | End: 2018-07-30

## 2018-08-09 ENCOUNTER — RX RENEWAL (OUTPATIENT)
Age: 68
End: 2018-08-09

## 2018-08-09 ENCOUNTER — APPOINTMENT (OUTPATIENT)
Dept: SURGERY | Facility: CLINIC | Age: 68
End: 2018-08-09
Payer: MEDICARE

## 2018-08-09 ENCOUNTER — OTHER (OUTPATIENT)
Age: 68
End: 2018-08-09

## 2018-08-09 VITALS
SYSTOLIC BLOOD PRESSURE: 157 MMHG | TEMPERATURE: 98.7 F | OXYGEN SATURATION: 98 % | HEIGHT: 64 IN | BODY MASS INDEX: 26.12 KG/M2 | HEART RATE: 69 BPM | WEIGHT: 153 LBS | DIASTOLIC BLOOD PRESSURE: 94 MMHG

## 2018-08-09 DIAGNOSIS — Z92.21 PERSONAL HISTORY OF ANTINEOPLASTIC CHEMOTHERAPY: ICD-10-CM

## 2018-08-09 PROCEDURE — 99024 POSTOP FOLLOW-UP VISIT: CPT

## 2018-08-10 ENCOUNTER — RX RENEWAL (OUTPATIENT)
Age: 68
End: 2018-08-10

## 2018-08-10 ENCOUNTER — LABORATORY RESULT (OUTPATIENT)
Age: 68
End: 2018-08-10

## 2018-08-10 ENCOUNTER — EMERGENCY (EMERGENCY)
Facility: HOSPITAL | Age: 68
LOS: 1 days | Discharge: HOME | End: 2018-08-10
Attending: EMERGENCY MEDICINE
Payer: MEDICARE

## 2018-08-10 ENCOUNTER — RESULT REVIEW (OUTPATIENT)
Age: 68
End: 2018-08-10

## 2018-08-10 ENCOUNTER — APPOINTMENT (OUTPATIENT)
Dept: INFUSION THERAPY | Facility: CLINIC | Age: 68
End: 2018-08-10

## 2018-08-10 VITALS
SYSTOLIC BLOOD PRESSURE: 196 MMHG | RESPIRATION RATE: 18 BRPM | HEART RATE: 69 BPM | DIASTOLIC BLOOD PRESSURE: 92 MMHG | OXYGEN SATURATION: 99 % | TEMPERATURE: 98 F

## 2018-08-10 VITALS
WEIGHT: 153 LBS | SYSTOLIC BLOOD PRESSURE: 95 MMHG | HEART RATE: 87 BPM | OXYGEN SATURATION: 99 % | TEMPERATURE: 98 F | HEIGHT: 64 IN | RESPIRATION RATE: 16 BRPM | DIASTOLIC BLOOD PRESSURE: 70 MMHG

## 2018-08-10 DIAGNOSIS — J38.1 POLYP OF VOCAL CORD AND LARYNX: Chronic | ICD-10-CM

## 2018-08-10 DIAGNOSIS — Z90.13 ACQUIRED ABSENCE OF BILATERAL BREASTS AND NIPPLES: Chronic | ICD-10-CM

## 2018-08-10 DIAGNOSIS — Z98.890 OTHER SPECIFIED POSTPROCEDURAL STATES: Chronic | ICD-10-CM

## 2018-08-10 DIAGNOSIS — Z90.710 ACQUIRED ABSENCE OF BOTH CERVIX AND UTERUS: Chronic | ICD-10-CM

## 2018-08-10 LAB
BASOPHILS # BLD AUTO: 0 K/UL — SIGNIFICANT CHANGE UP (ref 0–0.2)
BASOPHILS NFR BLD AUTO: 0.4 % — SIGNIFICANT CHANGE UP (ref 0–2)
EOSINOPHIL # BLD AUTO: 0 K/UL — SIGNIFICANT CHANGE UP (ref 0–0.5)
EOSINOPHIL NFR BLD AUTO: 0.2 % — SIGNIFICANT CHANGE UP (ref 0–6)
HCT VFR BLD CALC: 33 % — LOW (ref 34.5–45)
HGB BLD-MCNC: 11 G/DL — LOW (ref 11.5–15.5)
LYMPHOCYTES # BLD AUTO: 1.1 K/UL — SIGNIFICANT CHANGE UP (ref 1–3.3)
LYMPHOCYTES # BLD AUTO: 14.4 % — SIGNIFICANT CHANGE UP (ref 13–44)
MCHC RBC-ENTMCNC: 27.8 PG — SIGNIFICANT CHANGE UP (ref 27–34)
MCHC RBC-ENTMCNC: 33.4 GM/DL — SIGNIFICANT CHANGE UP (ref 32–36)
MCV RBC AUTO: 83.3 FL — SIGNIFICANT CHANGE UP (ref 80–100)
MONOCYTES # BLD AUTO: 0.1 K/UL — SIGNIFICANT CHANGE UP (ref 0–0.9)
MONOCYTES NFR BLD AUTO: 1.8 % — LOW (ref 2–14)
NEUTROPHILS # BLD AUTO: 6.5 K/UL — SIGNIFICANT CHANGE UP (ref 1.8–7.4)
NEUTROPHILS NFR BLD AUTO: 83.2 % — HIGH (ref 43–77)
PLATELET # BLD AUTO: 263 K/UL — SIGNIFICANT CHANGE UP (ref 150–400)
RBC # BLD: 3.96 M/UL — SIGNIFICANT CHANGE UP (ref 3.8–5.2)
RBC # FLD: 13 % — SIGNIFICANT CHANGE UP (ref 10.3–14.5)
WBC # BLD: 7.8 K/UL — SIGNIFICANT CHANGE UP (ref 3.8–10.5)
WBC # FLD AUTO: 7.8 K/UL — SIGNIFICANT CHANGE UP (ref 3.8–10.5)

## 2018-08-10 PROCEDURE — 99284 EMERGENCY DEPT VISIT MOD MDM: CPT

## 2018-08-10 PROCEDURE — 93010 ELECTROCARDIOGRAM REPORT: CPT

## 2018-08-10 PROCEDURE — 99283 EMERGENCY DEPT VISIT LOW MDM: CPT

## 2018-08-10 RX ORDER — LABETALOL HCL 100 MG
10 TABLET ORAL ONCE
Qty: 0 | Refills: 0 | Status: DISCONTINUED | OUTPATIENT
Start: 2018-08-10 | End: 2018-08-15

## 2018-08-10 RX ORDER — SODIUM CHLORIDE 9 MG/ML
1000 INJECTION, SOLUTION INTRAVENOUS ONCE
Qty: 0 | Refills: 0 | Status: DISCONTINUED | OUTPATIENT
Start: 2018-08-10 | End: 2018-08-15

## 2018-08-10 NOTE — ED ADULT TRIAGE NOTE - CHIEF COMPLAINT QUOTE
pt comes to ed from [facility] after first time chemo treatment; reportedly had drug reaction of dizziness/nausea/flushing. was treated with 20 pepcid 100 solu cortef and 50 iv benadryl now feels much better. respirations even and unlabored.

## 2018-08-10 NOTE — ED ADULT NURSE NOTE - NSIMPLEMENTINTERV_GEN_ALL_ED
Implemented All Universal Safety Interventions:  [place] to call system. Call bell, personal items and telephone within reach. Instruct patient to call for assistance. Room bathroom lighting operational. Non-slip footwear when patient is off stretcher. Physically safe environment: no spills, clutter or unnecessary equipment. Stretcher in lowest position, wheels locked, appropriate side rails in place.

## 2018-08-10 NOTE — ED PROVIDER NOTE - PROGRESS NOTE DETAILS
The patient is alert and oriented x4 and has capacity to make decision and the patient wants to sign out against medical advise.  The patient understands the risks of signing out AMA that includes high blood pressure to stroke to death

## 2018-08-10 NOTE — ED ADULT NURSE NOTE - PMH
Back pain  Chronic  Breast CA    COPD (chronic obstructive pulmonary disease)    GERD (gastroesophageal reflux disease)    Hypertension

## 2018-08-10 NOTE — ED ADULT NURSE NOTE - CHIEF COMPLAINT QUOTE
pt comes to ed from [facility] after first time chemo treatment; reportedly had drug reaction of dizziness/nausea/flushing. was treated with 20 pepcid 100 solu cortef and 50 iv benadryl now feels much better. respirations even and unlabored.

## 2018-08-10 NOTE — ED ADULT NURSE NOTE - PSH
H/O abdominal hysterectomy    H/O mastectomy, bilateral  june 18 2018  History of lumpectomy  left 2005  Vocal cord polyp  excision 1999

## 2018-08-10 NOTE — ED PROVIDER NOTE - OBJECTIVE STATEMENT
69 y/o F with PMHx of back pain, COPD, GERD and HTN presents to ED c/o sudden allergic reaction today. She had a round of chemotherapy this morning and after infusion notes feeling dizzy, nauseous, and flushed. Her symptoms have since resolved and she wants to go home. She denies SOB, CP, throat swelling, fever, chills. No further complaints at this time

## 2018-08-13 DIAGNOSIS — Z51.11 ENCOUNTER FOR ANTINEOPLASTIC CHEMOTHERAPY: ICD-10-CM

## 2018-08-13 DIAGNOSIS — R11.2 NAUSEA WITH VOMITING, UNSPECIFIED: ICD-10-CM

## 2018-08-20 ENCOUNTER — RESULT REVIEW (OUTPATIENT)
Age: 68
End: 2018-08-20

## 2018-08-20 ENCOUNTER — APPOINTMENT (OUTPATIENT)
Dept: HEMATOLOGY ONCOLOGY | Facility: CLINIC | Age: 68
End: 2018-08-20
Payer: MEDICARE

## 2018-08-20 ENCOUNTER — OUTPATIENT (OUTPATIENT)
Dept: OUTPATIENT SERVICES | Facility: HOSPITAL | Age: 68
LOS: 1 days | Discharge: HOME | End: 2018-08-20

## 2018-08-20 VITALS
HEART RATE: 68 BPM | WEIGHT: 152.34 LBS | BODY MASS INDEX: 26.01 KG/M2 | SYSTOLIC BLOOD PRESSURE: 110 MMHG | OXYGEN SATURATION: 98 % | DIASTOLIC BLOOD PRESSURE: 68 MMHG | TEMPERATURE: 98.2 F | HEIGHT: 64 IN

## 2018-08-20 DIAGNOSIS — Z90.13 ACQUIRED ABSENCE OF BILATERAL BREASTS AND NIPPLES: Chronic | ICD-10-CM

## 2018-08-20 DIAGNOSIS — Z90.710 ACQUIRED ABSENCE OF BOTH CERVIX AND UTERUS: Chronic | ICD-10-CM

## 2018-08-20 DIAGNOSIS — C50.919 MALIGNANT NEOPLASM OF UNSPECIFIED SITE OF UNSPECIFIED FEMALE BREAST: ICD-10-CM

## 2018-08-20 DIAGNOSIS — J38.1 POLYP OF VOCAL CORD AND LARYNX: Chronic | ICD-10-CM

## 2018-08-20 DIAGNOSIS — K12.31 ORAL MUCOSITIS (ULCERATIVE) DUE TO ANTINEOPLASTIC THERAPY: ICD-10-CM

## 2018-08-20 DIAGNOSIS — Z98.890 OTHER SPECIFIED POSTPROCEDURAL STATES: Chronic | ICD-10-CM

## 2018-08-20 DIAGNOSIS — D70.1 AGRANULOCYTOSIS SECONDARY TO CANCER CHEMOTHERAPY: ICD-10-CM

## 2018-08-20 DIAGNOSIS — T45.1X5A AGRANULOCYTOSIS SECONDARY TO CANCER CHEMOTHERAPY: ICD-10-CM

## 2018-08-20 LAB
BASOPHILS # BLD AUTO: 0 K/UL — SIGNIFICANT CHANGE UP (ref 0–0.2)
BASOPHILS NFR BLD AUTO: 1.7 % — SIGNIFICANT CHANGE UP (ref 0–2)
EOSINOPHIL # BLD AUTO: 0.1 K/UL — SIGNIFICANT CHANGE UP (ref 0–0.5)
EOSINOPHIL NFR BLD AUTO: 5.4 % — SIGNIFICANT CHANGE UP (ref 0–6)
HCT VFR BLD CALC: 28.1 % — LOW (ref 34.5–45)
HGB BLD-MCNC: 9.8 G/DL — LOW (ref 11.5–15.5)
LYMPHOCYTES # BLD AUTO: 0.9 K/UL — LOW (ref 1–3.3)
LYMPHOCYTES # BLD AUTO: 68.8 % — HIGH (ref 13–44)
MCHC RBC-ENTMCNC: 28 PG — SIGNIFICANT CHANGE UP (ref 27–34)
MCHC RBC-ENTMCNC: 35 GM/DL — SIGNIFICANT CHANGE UP (ref 32–36)
MCV RBC AUTO: 80.2 FL — SIGNIFICANT CHANGE UP (ref 80–100)
MONOCYTES # BLD AUTO: 0.3 K/UL — SIGNIFICANT CHANGE UP (ref 0–0.9)
MONOCYTES NFR BLD AUTO: 19.9 % — HIGH (ref 2–14)
NEUTROPHILS # BLD AUTO: 0.1 K/UL — LOW (ref 1.8–7.4)
NEUTROPHILS NFR BLD AUTO: 4.2 % — LOW (ref 43–77)
PLATELET # BLD AUTO: 257 K/UL — SIGNIFICANT CHANGE UP (ref 150–400)
RBC # BLD: 3.5 M/UL — LOW (ref 3.8–5.2)
RBC # FLD: 12.3 % — SIGNIFICANT CHANGE UP (ref 10.3–14.5)
WBC # BLD: 1.3 K/UL — LOW (ref 3.8–10.5)
WBC # FLD AUTO: 1.3 K/UL — LOW (ref 3.8–10.5)

## 2018-08-20 PROCEDURE — 99214 OFFICE O/P EST MOD 30 MIN: CPT

## 2018-08-30 ENCOUNTER — RESULT REVIEW (OUTPATIENT)
Age: 68
End: 2018-08-30

## 2018-08-30 ENCOUNTER — APPOINTMENT (OUTPATIENT)
Dept: HEMATOLOGY ONCOLOGY | Facility: CLINIC | Age: 68
End: 2018-08-30
Payer: MEDICARE

## 2018-08-30 VITALS
HEIGHT: 64 IN | BODY MASS INDEX: 26.05 KG/M2 | DIASTOLIC BLOOD PRESSURE: 89 MMHG | HEART RATE: 63 BPM | SYSTOLIC BLOOD PRESSURE: 178 MMHG | WEIGHT: 152.56 LBS | OXYGEN SATURATION: 100 %

## 2018-08-30 DIAGNOSIS — Z79.899 OTHER LONG TERM (CURRENT) DRUG THERAPY: ICD-10-CM

## 2018-08-30 LAB
BASOPHILS # BLD AUTO: 0.1 K/UL — SIGNIFICANT CHANGE UP (ref 0–0.2)
BASOPHILS NFR BLD AUTO: 1.1 % — SIGNIFICANT CHANGE UP (ref 0–2)
EOSINOPHIL # BLD AUTO: 0.2 K/UL — SIGNIFICANT CHANGE UP (ref 0–0.5)
EOSINOPHIL NFR BLD AUTO: 2.3 % — SIGNIFICANT CHANGE UP (ref 0–6)
HCT VFR BLD CALC: 29.7 % — LOW (ref 34.5–45)
HGB BLD-MCNC: 10.3 G/DL — LOW (ref 11.5–15.5)
LYMPHOCYTES # BLD AUTO: 2.1 K/UL — SIGNIFICANT CHANGE UP (ref 1–3.3)
LYMPHOCYTES # BLD AUTO: 24.2 % — SIGNIFICANT CHANGE UP (ref 13–44)
MCHC RBC-ENTMCNC: 28.4 PG — SIGNIFICANT CHANGE UP (ref 27–34)
MCHC RBC-ENTMCNC: 34.5 GM/DL — SIGNIFICANT CHANGE UP (ref 32–36)
MCV RBC AUTO: 82.2 FL — SIGNIFICANT CHANGE UP (ref 80–100)
MONOCYTES # BLD AUTO: 0.6 K/UL — SIGNIFICANT CHANGE UP (ref 0–0.9)
MONOCYTES NFR BLD AUTO: 7.6 % — SIGNIFICANT CHANGE UP (ref 2–14)
NEUTROPHILS # BLD AUTO: 5.5 K/UL — SIGNIFICANT CHANGE UP (ref 1.8–7.4)
NEUTROPHILS NFR BLD AUTO: 64.8 % — SIGNIFICANT CHANGE UP (ref 43–77)
PLATELET # BLD AUTO: 270 K/UL — SIGNIFICANT CHANGE UP (ref 150–400)
RBC # BLD: 3.62 M/UL — LOW (ref 3.8–5.2)
RBC # FLD: 14.2 % — SIGNIFICANT CHANGE UP (ref 10.3–14.5)
WBC # BLD: 8.5 K/UL — SIGNIFICANT CHANGE UP (ref 3.8–10.5)
WBC # FLD AUTO: 8.5 K/UL — SIGNIFICANT CHANGE UP (ref 3.8–10.5)

## 2018-08-30 PROCEDURE — 99214 OFFICE O/P EST MOD 30 MIN: CPT

## 2018-08-30 RX ORDER — CIPROFLOXACIN HYDROCHLORIDE 500 MG/1
500 TABLET, FILM COATED ORAL TWICE DAILY
Qty: 10 | Refills: 0 | Status: DISCONTINUED | COMMUNITY
Start: 2018-08-20 | End: 2018-08-30

## 2018-08-31 ENCOUNTER — RX RENEWAL (OUTPATIENT)
Age: 68
End: 2018-08-31

## 2018-08-31 ENCOUNTER — APPOINTMENT (OUTPATIENT)
Dept: INFUSION THERAPY | Facility: CLINIC | Age: 68
End: 2018-08-31

## 2018-08-31 ENCOUNTER — LABORATORY RESULT (OUTPATIENT)
Age: 68
End: 2018-08-31

## 2018-09-04 DIAGNOSIS — F41.9 ANXIETY DISORDER, UNSPECIFIED: ICD-10-CM

## 2018-09-05 DIAGNOSIS — R11.2 NAUSEA WITH VOMITING, UNSPECIFIED: ICD-10-CM

## 2018-09-05 DIAGNOSIS — Z51.11 ENCOUNTER FOR ANTINEOPLASTIC CHEMOTHERAPY: ICD-10-CM

## 2018-09-05 DIAGNOSIS — Z51.89 ENCOUNTER FOR OTHER SPECIFIED AFTERCARE: ICD-10-CM

## 2018-09-17 ENCOUNTER — OUTPATIENT (OUTPATIENT)
Dept: OUTPATIENT SERVICES | Facility: HOSPITAL | Age: 68
LOS: 1 days | Discharge: HOME | End: 2018-09-17

## 2018-09-17 DIAGNOSIS — Z90.710 ACQUIRED ABSENCE OF BOTH CERVIX AND UTERUS: Chronic | ICD-10-CM

## 2018-09-17 DIAGNOSIS — Z98.890 OTHER SPECIFIED POSTPROCEDURAL STATES: Chronic | ICD-10-CM

## 2018-09-17 DIAGNOSIS — C50.919 MALIGNANT NEOPLASM OF UNSPECIFIED SITE OF UNSPECIFIED FEMALE BREAST: ICD-10-CM

## 2018-09-17 DIAGNOSIS — Z90.13 ACQUIRED ABSENCE OF BILATERAL BREASTS AND NIPPLES: Chronic | ICD-10-CM

## 2018-09-17 DIAGNOSIS — J38.1 POLYP OF VOCAL CORD AND LARYNX: Chronic | ICD-10-CM

## 2018-09-20 ENCOUNTER — APPOINTMENT (OUTPATIENT)
Dept: HEMATOLOGY ONCOLOGY | Facility: CLINIC | Age: 68
End: 2018-09-20
Payer: MEDICARE

## 2018-09-20 ENCOUNTER — RESULT REVIEW (OUTPATIENT)
Age: 68
End: 2018-09-20

## 2018-09-20 VITALS
TEMPERATURE: 97.8 F | HEIGHT: 64 IN | BODY MASS INDEX: 26.23 KG/M2 | WEIGHT: 153.66 LBS | DIASTOLIC BLOOD PRESSURE: 76 MMHG | HEART RATE: 73 BPM | OXYGEN SATURATION: 97 % | SYSTOLIC BLOOD PRESSURE: 147 MMHG

## 2018-09-20 LAB
BASOPHILS # BLD AUTO: 0.1 K/UL — SIGNIFICANT CHANGE UP (ref 0–0.2)
BASOPHILS NFR BLD AUTO: 1.1 % — SIGNIFICANT CHANGE UP (ref 0–2)
EOSINOPHIL # BLD AUTO: 0 K/UL — SIGNIFICANT CHANGE UP (ref 0–0.5)
EOSINOPHIL NFR BLD AUTO: 0.4 % — SIGNIFICANT CHANGE UP (ref 0–6)
HCT VFR BLD CALC: 33.3 % — LOW (ref 34.5–45)
HGB BLD-MCNC: 10.4 G/DL — LOW (ref 11.5–15.5)
LYMPHOCYTES # BLD AUTO: 0.8 K/UL — LOW (ref 1–3.3)
LYMPHOCYTES # BLD AUTO: 11.3 % — LOW (ref 13–44)
MCHC RBC-ENTMCNC: 26.7 PG — LOW (ref 27–34)
MCHC RBC-ENTMCNC: 31.1 GM/DL — LOW (ref 32–36)
MCV RBC AUTO: 85.6 FL — SIGNIFICANT CHANGE UP (ref 80–100)
MONOCYTES # BLD AUTO: 0.2 K/UL — SIGNIFICANT CHANGE UP (ref 0–0.9)
MONOCYTES NFR BLD AUTO: 2.2 % — SIGNIFICANT CHANGE UP (ref 2–14)
NEUTROPHILS # BLD AUTO: 5.7 K/UL — SIGNIFICANT CHANGE UP (ref 1.8–7.4)
NEUTROPHILS NFR BLD AUTO: 85 % — HIGH (ref 43–77)
PLATELET # BLD AUTO: 314 K/UL — SIGNIFICANT CHANGE UP (ref 150–400)
RBC # BLD: 3.89 M/UL — SIGNIFICANT CHANGE UP (ref 3.8–5.2)
RBC # FLD: 17.2 % — HIGH (ref 10.3–14.5)
WBC # BLD: 6.7 K/UL — SIGNIFICANT CHANGE UP (ref 3.8–10.5)
WBC # FLD AUTO: 6.7 K/UL — SIGNIFICANT CHANGE UP (ref 3.8–10.5)

## 2018-09-20 PROCEDURE — 99214 OFFICE O/P EST MOD 30 MIN: CPT

## 2018-09-21 ENCOUNTER — LABORATORY RESULT (OUTPATIENT)
Age: 68
End: 2018-09-21

## 2018-09-21 ENCOUNTER — APPOINTMENT (OUTPATIENT)
Dept: INFUSION THERAPY | Facility: CLINIC | Age: 68
End: 2018-09-21

## 2018-09-21 LAB
ALBUMIN SERPL ELPH-MCNC: 4.3 G/DL
ALP BLD-CCNC: 105 U/L
ALT SERPL-CCNC: 10 U/L
ANION GAP SERPL CALC-SCNC: 15 MMOL/L
AST SERPL-CCNC: 16 U/L
BILIRUB SERPL-MCNC: 0.2 MG/DL
BUN SERPL-MCNC: 12 MG/DL
CALCIUM SERPL-MCNC: 9.1 MG/DL
CHLORIDE SERPL-SCNC: 101 MMOL/L
CO2 SERPL-SCNC: 27 MMOL/L
CREAT SERPL-MCNC: 0.92 MG/DL
MAGNESIUM SERPL-MCNC: 2 MG/DL
POTASSIUM SERPL-SCNC: 4.2 MMOL/L
PROT SERPL-MCNC: 6.4 G/DL
SODIUM SERPL-SCNC: 143 MMOL/L

## 2018-09-24 DIAGNOSIS — Z51.11 ENCOUNTER FOR ANTINEOPLASTIC CHEMOTHERAPY: ICD-10-CM

## 2018-09-24 DIAGNOSIS — R11.2 NAUSEA WITH VOMITING, UNSPECIFIED: ICD-10-CM

## 2018-09-24 DIAGNOSIS — Z51.89 ENCOUNTER FOR OTHER SPECIFIED AFTERCARE: ICD-10-CM

## 2018-10-05 ENCOUNTER — APPOINTMENT (OUTPATIENT)
Dept: PLASTIC SURGERY | Facility: CLINIC | Age: 68
End: 2018-10-05

## 2018-10-09 ENCOUNTER — APPOINTMENT (OUTPATIENT)
Dept: HEMATOLOGY ONCOLOGY | Facility: CLINIC | Age: 68
End: 2018-10-09
Payer: MEDICARE

## 2018-10-09 ENCOUNTER — RESULT REVIEW (OUTPATIENT)
Age: 68
End: 2018-10-09

## 2018-10-09 VITALS
BODY MASS INDEX: 25.56 KG/M2 | OXYGEN SATURATION: 96 % | HEART RATE: 67 BPM | TEMPERATURE: 98.4 F | SYSTOLIC BLOOD PRESSURE: 118 MMHG | DIASTOLIC BLOOD PRESSURE: 76 MMHG | WEIGHT: 149.69 LBS | HEIGHT: 64 IN

## 2018-10-09 DIAGNOSIS — F19.982 OTHER PSYCHOACTIVE SUBSTANCE USE, UNSPECIFIED WITH PSYCHOACTIVE SUBSTANCE-INDUCED SLEEP DISORDER: ICD-10-CM

## 2018-10-09 LAB
BASOPHILS # BLD AUTO: 0.1 K/UL — SIGNIFICANT CHANGE UP (ref 0–0.2)
BASOPHILS NFR BLD AUTO: 2.2 % — HIGH (ref 0–2)
EOSINOPHIL # BLD AUTO: 0.1 K/UL — SIGNIFICANT CHANGE UP (ref 0–0.5)
EOSINOPHIL NFR BLD AUTO: 1.7 % — SIGNIFICANT CHANGE UP (ref 0–6)
HCT VFR BLD CALC: 28.2 % — LOW (ref 34.5–45)
HGB BLD-MCNC: 9.1 G/DL — LOW (ref 11.5–15.5)
LYMPHOCYTES # BLD AUTO: 1 K/UL — SIGNIFICANT CHANGE UP (ref 1–3.3)
LYMPHOCYTES # BLD AUTO: 24.4 % — SIGNIFICANT CHANGE UP (ref 13–44)
MCHC RBC-ENTMCNC: 26.7 PG — LOW (ref 27–34)
MCHC RBC-ENTMCNC: 32.4 GM/DL — SIGNIFICANT CHANGE UP (ref 32–36)
MCV RBC AUTO: 82.4 FL — SIGNIFICANT CHANGE UP (ref 80–100)
MONOCYTES # BLD AUTO: 0.5 K/UL — SIGNIFICANT CHANGE UP (ref 0–0.9)
MONOCYTES NFR BLD AUTO: 12.1 % — SIGNIFICANT CHANGE UP (ref 2–14)
NEUTROPHILS # BLD AUTO: 2.5 K/UL — SIGNIFICANT CHANGE UP (ref 1.8–7.4)
NEUTROPHILS NFR BLD AUTO: 59.6 % — SIGNIFICANT CHANGE UP (ref 43–77)
PLATELET # BLD AUTO: 312 K/UL — SIGNIFICANT CHANGE UP (ref 150–400)
RBC # BLD: 3.43 M/UL — LOW (ref 3.8–5.2)
RBC # FLD: 16.7 % — HIGH (ref 10.3–14.5)
WBC # BLD: 4.2 K/UL — SIGNIFICANT CHANGE UP (ref 3.8–10.5)
WBC # FLD AUTO: 4.2 K/UL — SIGNIFICANT CHANGE UP (ref 3.8–10.5)

## 2018-10-09 PROCEDURE — 99214 OFFICE O/P EST MOD 30 MIN: CPT

## 2018-10-10 ENCOUNTER — RX RENEWAL (OUTPATIENT)
Age: 68
End: 2018-10-10

## 2018-10-10 RX ORDER — ALPRAZOLAM 1 MG/1
1 TABLET ORAL
Qty: 60 | Refills: 0 | Status: ACTIVE | COMMUNITY
Start: 1900-01-01 | End: 1900-01-01

## 2018-10-12 ENCOUNTER — RESULT REVIEW (OUTPATIENT)
Age: 68
End: 2018-10-12

## 2018-10-12 ENCOUNTER — LABORATORY RESULT (OUTPATIENT)
Age: 68
End: 2018-10-12

## 2018-10-12 ENCOUNTER — APPOINTMENT (OUTPATIENT)
Dept: INFUSION THERAPY | Facility: CLINIC | Age: 68
End: 2018-10-12

## 2018-10-12 LAB
BASOPHILS # BLD AUTO: 0.1 K/UL — SIGNIFICANT CHANGE UP (ref 0–0.2)
BASOPHILS NFR BLD AUTO: 1.5 % — SIGNIFICANT CHANGE UP (ref 0–2)
EOSINOPHIL # BLD AUTO: 0.1 K/UL — SIGNIFICANT CHANGE UP (ref 0–0.5)
EOSINOPHIL NFR BLD AUTO: 1.3 % — SIGNIFICANT CHANGE UP (ref 0–6)
HCT VFR BLD CALC: 28.6 % — LOW (ref 34.5–45)
HGB BLD-MCNC: 9.3 G/DL — LOW (ref 11.5–15.5)
LYMPHOCYTES # BLD AUTO: 1.3 K/UL — SIGNIFICANT CHANGE UP (ref 1–3.3)
LYMPHOCYTES # BLD AUTO: 22.1 % — SIGNIFICANT CHANGE UP (ref 13–44)
MCHC RBC-ENTMCNC: 27.2 PG — SIGNIFICANT CHANGE UP (ref 27–34)
MCHC RBC-ENTMCNC: 32.6 GM/DL — SIGNIFICANT CHANGE UP (ref 32–36)
MCV RBC AUTO: 83.2 FL — SIGNIFICANT CHANGE UP (ref 80–100)
MONOCYTES # BLD AUTO: 0.6 K/UL — SIGNIFICANT CHANGE UP (ref 0–0.9)
MONOCYTES NFR BLD AUTO: 10.4 % — SIGNIFICANT CHANGE UP (ref 2–14)
NEUTROPHILS # BLD AUTO: 3.8 K/UL — SIGNIFICANT CHANGE UP (ref 1.8–7.4)
NEUTROPHILS NFR BLD AUTO: 64.8 % — SIGNIFICANT CHANGE UP (ref 43–77)
PLATELET # BLD AUTO: 378 K/UL — SIGNIFICANT CHANGE UP (ref 150–400)
RBC # BLD: 3.44 M/UL — LOW (ref 3.8–5.2)
RBC # FLD: 16.9 % — HIGH (ref 10.3–14.5)
WBC # BLD: 5.9 K/UL — SIGNIFICANT CHANGE UP (ref 3.8–10.5)
WBC # FLD AUTO: 5.9 K/UL — SIGNIFICANT CHANGE UP (ref 3.8–10.5)

## 2018-10-29 ENCOUNTER — APPOINTMENT (OUTPATIENT)
Dept: SURGERY | Facility: CLINIC | Age: 68
End: 2018-10-29
Payer: MEDICARE

## 2018-10-29 VITALS
HEART RATE: 72 BPM | BODY MASS INDEX: 24.75 KG/M2 | WEIGHT: 145 LBS | OXYGEN SATURATION: 95 % | TEMPERATURE: 98.2 F | DIASTOLIC BLOOD PRESSURE: 64 MMHG | SYSTOLIC BLOOD PRESSURE: 97 MMHG | HEIGHT: 64 IN

## 2018-10-29 PROCEDURE — 99214 OFFICE O/P EST MOD 30 MIN: CPT

## 2018-10-31 ENCOUNTER — OUTPATIENT (OUTPATIENT)
Dept: OUTPATIENT SERVICES | Facility: HOSPITAL | Age: 68
LOS: 1 days | Discharge: HOME | End: 2018-10-31

## 2018-10-31 DIAGNOSIS — C50.919 MALIGNANT NEOPLASM OF UNSPECIFIED SITE OF UNSPECIFIED FEMALE BREAST: ICD-10-CM

## 2018-10-31 DIAGNOSIS — Z90.710 ACQUIRED ABSENCE OF BOTH CERVIX AND UTERUS: Chronic | ICD-10-CM

## 2018-10-31 DIAGNOSIS — J38.1 POLYP OF VOCAL CORD AND LARYNX: Chronic | ICD-10-CM

## 2018-10-31 DIAGNOSIS — Z98.890 OTHER SPECIFIED POSTPROCEDURAL STATES: Chronic | ICD-10-CM

## 2018-10-31 DIAGNOSIS — Z90.13 ACQUIRED ABSENCE OF BILATERAL BREASTS AND NIPPLES: Chronic | ICD-10-CM

## 2018-11-02 ENCOUNTER — APPOINTMENT (OUTPATIENT)
Dept: INFUSION THERAPY | Facility: CLINIC | Age: 68
End: 2018-11-02

## 2018-11-08 ENCOUNTER — APPOINTMENT (OUTPATIENT)
Dept: HEMATOLOGY ONCOLOGY | Facility: CLINIC | Age: 68
End: 2018-11-08
Payer: MEDICARE

## 2018-11-08 ENCOUNTER — RESULT REVIEW (OUTPATIENT)
Age: 68
End: 2018-11-08

## 2018-11-08 VITALS
WEIGHT: 146.83 LBS | SYSTOLIC BLOOD PRESSURE: 119 MMHG | DIASTOLIC BLOOD PRESSURE: 76 MMHG | TEMPERATURE: 97.6 F | HEART RATE: 67 BPM | BODY MASS INDEX: 25.07 KG/M2 | OXYGEN SATURATION: 97 % | HEIGHT: 64 IN

## 2018-11-08 LAB
BASOPHILS # BLD AUTO: 0.1 K/UL — SIGNIFICANT CHANGE UP (ref 0–0.2)
BASOPHILS NFR BLD AUTO: 1.5 % — SIGNIFICANT CHANGE UP (ref 0–2)
EOSINOPHIL # BLD AUTO: 0.2 K/UL — SIGNIFICANT CHANGE UP (ref 0–0.5)
EOSINOPHIL NFR BLD AUTO: 4.9 % — SIGNIFICANT CHANGE UP (ref 0–6)
HCT VFR BLD CALC: 31 % — LOW (ref 34.5–45)
HGB BLD-MCNC: 10.3 G/DL — LOW (ref 11.5–15.5)
LYMPHOCYTES # BLD AUTO: 1.4 K/UL — SIGNIFICANT CHANGE UP (ref 1–3.3)
LYMPHOCYTES # BLD AUTO: 28.6 % — SIGNIFICANT CHANGE UP (ref 13–44)
MCHC RBC-ENTMCNC: 27.5 PG — SIGNIFICANT CHANGE UP (ref 27–34)
MCHC RBC-ENTMCNC: 33.2 GM/DL — SIGNIFICANT CHANGE UP (ref 32–36)
MCV RBC AUTO: 82.9 FL — SIGNIFICANT CHANGE UP (ref 80–100)
MONOCYTES # BLD AUTO: 0.5 K/UL — SIGNIFICANT CHANGE UP (ref 0–0.9)
MONOCYTES NFR BLD AUTO: 9.9 % — SIGNIFICANT CHANGE UP (ref 2–14)
NEUTROPHILS # BLD AUTO: 2.6 K/UL — SIGNIFICANT CHANGE UP (ref 1.8–7.4)
NEUTROPHILS NFR BLD AUTO: 55 % — SIGNIFICANT CHANGE UP (ref 43–77)
PLATELET # BLD AUTO: 288 K/UL — SIGNIFICANT CHANGE UP (ref 150–400)
RBC # BLD: 3.73 M/UL — LOW (ref 3.8–5.2)
RBC # FLD: 16.4 % — HIGH (ref 10.3–14.5)
WBC # BLD: 4.8 K/UL — SIGNIFICANT CHANGE UP (ref 3.8–10.5)
WBC # FLD AUTO: 4.8 K/UL — SIGNIFICANT CHANGE UP (ref 3.8–10.5)

## 2018-11-08 PROCEDURE — 99214 OFFICE O/P EST MOD 30 MIN: CPT

## 2018-11-08 RX ORDER — METOPROLOL TARTRATE 100 MG/1
100 TABLET, FILM COATED ORAL
Refills: 0 | Status: DISCONTINUED | COMMUNITY
End: 2018-11-08

## 2018-11-08 RX ORDER — LOSARTAN POTASSIUM AND HYDROCHLOROTHIAZIDE 25; 100 MG/1; MG/1
100-25 TABLET ORAL
Refills: 0 | Status: ACTIVE | COMMUNITY
Start: 2018-11-08

## 2018-11-08 NOTE — PHYSICAL EXAM
[Ambulatory and capable of all self care but unable to carry out any work activities] : Status 2- Ambulatory and capable of all self care but unable to carry out any work activities. Up and about more than 50% of waking hours [Normal] : affect appropriate [de-identified] : Fatigued appearance, well groomed, in NAD. [de-identified] : negative cervical, supra/infraclavicular adenopathy. [de-identified] : negative pedal edema or calve tenderness [de-identified] : BS+, soft, nontender on palpation. [de-identified] : without spinal or cva tenderness.

## 2018-11-08 NOTE — ASSESSMENT
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  Now with new bilateral 2nd breast primaries s/p bilateral mastectomy + CORA-BSO on 6/18/18.\par 1. Left IDC - 0.9 cm, grade 3, ER 0%, NY 20%, HER2 negative.  pT1b pN0 (stage IB)\par 2. Right IDC- 0.2cm, grade 3,  ER 0%, NY 0%, HER2 negative. pT1a pN0 (stage IB)\par \par Completed 4 cycles of TC on 10/12/18.  Hgb improved to 10.3 g/dL. \par Continues to feel fatigued, +insomnia which showed improve with time.  Discussed survivorship. \par  \par Plan:\par -Follow up in 3 months. \par

## 2018-11-08 NOTE — HISTORY OF PRESENT ILLNESS
[Disease: _____________________] : Disease: [unfilled] [T: ___] : T[unfilled] [N: ___] : N[unfilled] [AJCC Stage: ____] : AJCC Stage: [unfilled] [de-identified] : Ms. Knowles was diagnosed with left breast cancer at age 67 in March 2018.  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  Her oncologist was Dr. Sanchez at [facility].  \par \par She initially had left blood nipple discharge for 1 year and 2/2017 mammogram + ultrasound showed benign findings. \par Subsequent mammogram + ultrasound on 3/15/18 showed developing density in left retroareolar breast, and US confirming 0.7 x 0.6 x 0.9 cm hypoechoic nodule at 6:00, 1 cmfn with angulated margins.  \par \par 3/20/18 left breast 6:00 core biopsy, 1 cmfn: invasive poorly differentiated ductal carcinoma, Nicole score 8/9, measuring at least 9 cm. ER 0%, AK 20%, HER2 1+ (negative).\par \par 4/5/18 CT abd/pelvis - no lymphadenopathy or metastatic disease within abdomen or pelvis.  RLL 0.7 cm subpleural pulmonary nodule.\par \par 4/16/18 MRI breast - 1.0 cm irregular mass at the 6:00 axis of the left breast, corresponding to newly diagnosed malignancy. There is irregular non mass enhancement \par extending lateral to the index carcinoma and superiorly and anteriorly from the index carcinoma to involve the nipple, concerning for nipple involvement of disease.\par Prominent 9 mm enhancing focus at the 12:00 position of the right breast. If it would alter management, right breast MR guided core biopsy can be performed for further evaluation.\par \par On 5/15/18 she had MRI guided core biopsy of right breast 12:00 --> pathology invasive poorly differentiated ductal carcinoma, [place] 8/9, measuring at least 2mm, DCIS, solid pattern, high grade nuclear atypia.  ER 0%, AK 0%, HER2 negative. \par \par S/p bilateral mastectomy + SNL biopsies and CORA+BSO on 6/18/18. \par Pathology: \par 1. Left mastectomy - 0.9 cm IDC, Nicole 9/9, margins negative, none of 3 lymph nodes involved. ER 0%, AK 20%, HER2 negative.  pT1b pN0\par 2. Right mastectomy - 0.2cm IDC, [place] 9/9, margins negative, none of 3 lymph nodes involved. ER 0%, AK 0%, HER2 negative. pT1a pN0\par 3. CORA+BSO - negative for malignancy\par \par Family history is significant for maternal grandmother being diagnosed with breast cancer, 2 maternal aunts with breast cancer and mother diagnosed with breast cancer and father diagnosed with throat cancer.\par \par 3/23/18 [facility] genetic results - BRCA1 mutation [id number]\par \par Dr. Esquivel - pain management in [place] (following for back injury)\par \par Treatment summary:\par Docetaxel 75 mg/m2 and Cytoxan 600 mg/m2 every 3 weeks, w/ OnPro on day 1 each cycle. [de-identified] : invasive ductal [de-identified] : ER 0%, PR20%, HER2 negative [de-identified] : S/p C4 Taxotere/Cytoxan on 10/12/18. \par Since completion of chemotherapy, her sleep is improved. She is taking Ambien + Xanax at bedtime.\par Feels very tired. No nausea/vomiting.\par No fever. She notes tingling + cramps in her feet which is intermittent, worse at night. Not taking lyrica for last 3-4 days. \par No diarrhea. \par The remainder of ROS is negative.

## 2018-12-03 ENCOUNTER — APPOINTMENT (OUTPATIENT)
Dept: PLASTIC SURGERY | Facility: CLINIC | Age: 68
End: 2018-12-03
Payer: MEDICARE

## 2018-12-03 VITALS
WEIGHT: 146 LBS | DIASTOLIC BLOOD PRESSURE: 81 MMHG | OXYGEN SATURATION: 97 % | BODY MASS INDEX: 24.92 KG/M2 | RESPIRATION RATE: 16 BRPM | SYSTOLIC BLOOD PRESSURE: 118 MMHG | HEIGHT: 64 IN

## 2018-12-03 DIAGNOSIS — Z90.13 ACQUIRED ABSENCE OF BILATERAL BREASTS AND NIPPLES: ICD-10-CM

## 2018-12-03 PROCEDURE — 99215 OFFICE O/P EST HI 40 MIN: CPT

## 2019-02-08 ENCOUNTER — OUTPATIENT (OUTPATIENT)
Dept: OUTPATIENT SERVICES | Facility: HOSPITAL | Age: 69
LOS: 1 days | Discharge: HOME | End: 2019-02-08

## 2019-02-08 DIAGNOSIS — J38.1 POLYP OF VOCAL CORD AND LARYNX: Chronic | ICD-10-CM

## 2019-02-08 DIAGNOSIS — C50.919 MALIGNANT NEOPLASM OF UNSPECIFIED SITE OF UNSPECIFIED FEMALE BREAST: ICD-10-CM

## 2019-02-08 DIAGNOSIS — Z98.890 OTHER SPECIFIED POSTPROCEDURAL STATES: Chronic | ICD-10-CM

## 2019-02-08 DIAGNOSIS — Z90.710 ACQUIRED ABSENCE OF BOTH CERVIX AND UTERUS: Chronic | ICD-10-CM

## 2019-02-08 DIAGNOSIS — Z90.13 ACQUIRED ABSENCE OF BILATERAL BREASTS AND NIPPLES: Chronic | ICD-10-CM

## 2019-02-21 ENCOUNTER — RESULT REVIEW (OUTPATIENT)
Age: 69
End: 2019-02-21

## 2019-02-21 ENCOUNTER — APPOINTMENT (OUTPATIENT)
Dept: HEMATOLOGY ONCOLOGY | Facility: CLINIC | Age: 69
End: 2019-02-21
Payer: MEDICARE

## 2019-02-21 VITALS
HEIGHT: 64 IN | TEMPERATURE: 97.4 F | HEART RATE: 66 BPM | BODY MASS INDEX: 25.96 KG/M2 | OXYGEN SATURATION: 98 % | WEIGHT: 152.06 LBS | DIASTOLIC BLOOD PRESSURE: 92 MMHG | SYSTOLIC BLOOD PRESSURE: 172 MMHG

## 2019-02-21 LAB
BASOPHILS # BLD AUTO: 0.1 K/UL — SIGNIFICANT CHANGE UP (ref 0–0.2)
BASOPHILS NFR BLD AUTO: 0.9 % — SIGNIFICANT CHANGE UP (ref 0–2)
EOSINOPHIL # BLD AUTO: 0.1 K/UL — SIGNIFICANT CHANGE UP (ref 0–0.5)
EOSINOPHIL NFR BLD AUTO: 1.8 % — SIGNIFICANT CHANGE UP (ref 0–6)
HCT VFR BLD CALC: 37 % — SIGNIFICANT CHANGE UP (ref 34.5–45)
HGB BLD-MCNC: 11.8 G/DL — SIGNIFICANT CHANGE UP (ref 11.5–15.5)
LYMPHOCYTES # BLD AUTO: 1.7 K/UL — SIGNIFICANT CHANGE UP (ref 1–3.3)
LYMPHOCYTES # BLD AUTO: 24.6 % — SIGNIFICANT CHANGE UP (ref 13–44)
MCHC RBC-ENTMCNC: 25.1 PG — LOW (ref 27–34)
MCHC RBC-ENTMCNC: 32 G/DL — SIGNIFICANT CHANGE UP (ref 32–36)
MCV RBC AUTO: 78.6 FL — LOW (ref 80–100)
MONOCYTES # BLD AUTO: 0.5 K/UL — SIGNIFICANT CHANGE UP (ref 0–0.9)
MONOCYTES NFR BLD AUTO: 7.4 % — SIGNIFICANT CHANGE UP (ref 2–14)
NEUTROPHILS # BLD AUTO: 4.5 K/UL — SIGNIFICANT CHANGE UP (ref 1.8–7.4)
NEUTROPHILS NFR BLD AUTO: 65.2 % — SIGNIFICANT CHANGE UP (ref 43–77)
PLATELET # BLD AUTO: 301 K/UL — SIGNIFICANT CHANGE UP (ref 150–400)
RBC # BLD: 4.71 M/UL — SIGNIFICANT CHANGE UP (ref 3.8–5.2)
RBC # FLD: 16.1 % — HIGH (ref 10.3–14.5)
WBC # BLD: 6.8 K/UL — SIGNIFICANT CHANGE UP (ref 3.8–10.5)
WBC # FLD AUTO: 6.8 K/UL — SIGNIFICANT CHANGE UP (ref 3.8–10.5)

## 2019-02-21 PROCEDURE — 99214 OFFICE O/P EST MOD 30 MIN: CPT

## 2019-02-21 RX ORDER — TRAZODONE HYDROCHLORIDE 50 MG/1
50 TABLET ORAL
Qty: 30 | Refills: 0 | Status: DISCONTINUED | COMMUNITY
Start: 2018-09-20 | End: 2019-02-21

## 2019-02-21 RX ORDER — LIDOCAINE HYDROCHLORIDE 20 MG/ML
2 SOLUTION OROPHARYNGEAL
Qty: 100 | Refills: 3 | Status: DISCONTINUED | COMMUNITY
Start: 2018-08-20 | End: 2019-02-21

## 2019-02-21 RX ORDER — DEXAMETHASONE 4 MG/1
4 TABLET ORAL
Qty: 30 | Refills: 0 | Status: DISCONTINUED | COMMUNITY
Start: 2018-08-09 | End: 2019-02-21

## 2019-02-21 RX ORDER — PROCHLORPERAZINE MALEATE 10 MG/1
10 TABLET ORAL EVERY 6 HOURS
Qty: 120 | Refills: 1 | Status: DISCONTINUED | COMMUNITY
Start: 2018-08-09 | End: 2019-02-21

## 2019-02-21 RX ORDER — ONDANSETRON 8 MG/1
8 TABLET ORAL
Qty: 90 | Refills: 1 | Status: DISCONTINUED | COMMUNITY
Start: 2018-08-09 | End: 2019-02-21

## 2019-02-21 NOTE — PHYSICAL EXAM
[Ambulatory and capable of all self care but unable to carry out any work activities] : Status 2- Ambulatory and capable of all self care but unable to carry out any work activities. Up and about more than 50% of waking hours [Normal] : affect appropriate [de-identified] : Fatigued appearance, well groomed, in NAD. [de-identified] : negative cervical, supra/infraclavicular adenopathy. [de-identified] : negative pedal edema or calve tenderness [de-identified] : BS+, soft, nontender on palpation. [de-identified] : without spinal or cva tenderness.

## 2019-02-21 NOTE — ASSESSMENT
[FreeTextEntry1] : 68 year old female, BRCA 1 positive, with history of stage IIA left breast TNBC s/p lumpectomy in 2005, adjuvant AC-Taxotere, and RT.  Now with new bilateral 2nd breast primaries s/p bilateral mastectomy + CORA-BSO on 6/18/18.\par 1. Left IDC - 0.9 cm, grade 3, ER 0%, [place] 20%, HER2 negative.  pT1b pN0 (stage IB)\par 2. Right IDC- 0.2cm, grade 3,  ER 0%, PA 0%, HER2 negative. pT1a pN0 (stage IB)\par \par Completed 4 cycles of TC on 10/12/18.  Hgb improved to to 11.8 g/dL.  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 \par  \par Plan:\par -Neuropathy: consider increase Lyrica to BID\par -f/u with Dr. Love to maximize on medications she is already taking for neuropathy\par -Follow up in 3 months.

## 2019-02-21 NOTE — HISTORY OF PRESENT ILLNESS
[Disease: _____________________] : Disease: [unfilled] [T: ___] : T[unfilled] [N: ___] : N[unfilled] [AJCC Stage: ____] : AJCC Stage: [unfilled] [de-identified] : Ms. Knowles was diagnosed with left breast cancer at age 67 in March 2018.  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  Her oncologist was Dr. Sanchez at [facility].  \par \par She initially had left blood nipple discharge for 1 year and 2/2017 mammogram + ultrasound showed benign findings. \par Subsequent mammogram + ultrasound on 3/15/18 showed developing density in left retroareolar breast, and US confirming 0.7 x 0.6 x 0.9 cm hypoechoic nodule at 6:00, 1 cmfn with angulated margins.  \par \par 3/20/18 left breast 6:00 core biopsy, 1 cmfn: invasive poorly differentiated ductal carcinoma, Nicole score 8/9, measuring at least 9 cm. ER 0%, [place] 20%, HER2 1+ (negative).\par \par 4/5/18 CT abd/pelvis - no lymphadenopathy or metastatic disease within abdomen or pelvis.  RLL 0.7 cm subpleural pulmonary nodule.\par \par 4/16/18 MRI breast - 1.0 cm irregular mass at the 6:00 axis of the left breast, corresponding to newly diagnosed malignancy. There is irregular non mass enhancement \par extending lateral to the index carcinoma and superiorly and anteriorly from the index carcinoma to involve the nipple, concerning for nipple involvement of disease.\par Prominent 9 mm enhancing focus at the 12:00 position of the right breast. If it would alter management, right breast MR guided core biopsy can be performed for further evaluation.\par \par On 5/15/18 she had MRI guided core biopsy of right breast 12:00 --> pathology invasive poorly differentiated ductal carcinoma, [place] 8/9, measuring at least 2mm, DCIS, solid pattern, high grade nuclear atypia.  ER 0%, TX 0%, HER2 negative. \par \par S/p bilateral mastectomy + SNL biopsies and CORA+BSO on 6/18/18. \par Pathology: \par 1. Left mastectomy - 0.9 cm IDC, Nicole 9/9, margins negative, none of 3 lymph nodes involved. ER 0%, TX 20%, HER2 negative.  pT1b pN0\par 2. Right mastectomy - 0.2cm IDC, [place] 9/9, margins negative, none of 3 lymph nodes involved. ER 0%, TX 0%, HER2 negative. pT1a pN0\par 3. CORA+BSO - negative for malignancy\par \par Family history is significant for maternal grandmother being diagnosed with breast cancer, 2 maternal aunts with breast cancer and mother diagnosed with breast cancer and father diagnosed with throat cancer.\par \par 3/23/18 [facility] genetic results - BRCA1 mutation [id number]\par \par Dr. Esquivel - pain management in [place] (following for back injury)\par \par Treatment summary:\par 8/10/18 - 10/12/18  - 4 cycles of Docetaxel 75 mg/m2 and Cytoxan 600 mg/m2  [de-identified] : invasive ductal [de-identified] : ER 0%, PR20%, HER2 negative [de-identified] : Patient returns for follow up.\par She continues to have bilateral sharp neuropathic pain in her feet, worse at night. She has been taking Lyrica once a day, with no relief.  She is also on Duloxetine 60 mg daily\par She is seeing management for chronic back pain. \par She reports having normal bowel movements. \par Recently had a viral infection while in [place].

## 2019-02-22 ENCOUNTER — RX RENEWAL (OUTPATIENT)
Age: 69
End: 2019-02-22

## 2019-02-22 LAB
ALBUMIN SERPL ELPH-MCNC: 4.4 G/DL
ALP BLD-CCNC: 113 U/L
ALT SERPL-CCNC: 15 U/L
ANION GAP SERPL CALC-SCNC: 10 MMOL/L
AST SERPL-CCNC: 18 U/L
BILIRUB SERPL-MCNC: <0.2 MG/DL
BUN SERPL-MCNC: 14 MG/DL
CALCIUM SERPL-MCNC: 9.1 MG/DL
CHLORIDE SERPL-SCNC: 96 MMOL/L
CO2 SERPL-SCNC: 30 MMOL/L
CREAT SERPL-MCNC: 0.86 MG/DL
FERRITIN SERPL-MCNC: 8 NG/ML
FOLATE SERPL-MCNC: 4.8 NG/ML
IRON SATN MFR SERPL: 11 %
IRON SERPL-MCNC: 47 UG/DL
POTASSIUM SERPL-SCNC: 4.7 MMOL/L
PROT SERPL-MCNC: 6.8 G/DL
SODIUM SERPL-SCNC: 136 MMOL/L
TIBC SERPL-MCNC: 416 UG/DL
UIBC SERPL-MCNC: 369 UG/DL
VIT B12 SERPL-MCNC: 317 PG/ML

## 2019-03-18 ENCOUNTER — APPOINTMENT (OUTPATIENT)
Dept: PHYSICAL MEDICINE AND REHAB | Facility: CLINIC | Age: 69
End: 2019-03-18
Payer: MEDICARE

## 2019-03-18 VITALS
OXYGEN SATURATION: 98 % | WEIGHT: 161.5 LBS | TEMPERATURE: 97.8 F | BODY MASS INDEX: 27.57 KG/M2 | HEIGHT: 64 IN | SYSTOLIC BLOOD PRESSURE: 178 MMHG | HEART RATE: 60 BPM | DIASTOLIC BLOOD PRESSURE: 89 MMHG

## 2019-03-18 DIAGNOSIS — G89.29 DORSALGIA, UNSPECIFIED: ICD-10-CM

## 2019-03-18 DIAGNOSIS — M54.9 DORSALGIA, UNSPECIFIED: ICD-10-CM

## 2019-03-18 PROCEDURE — 99213 OFFICE O/P EST LOW 20 MIN: CPT

## 2019-03-25 PROBLEM — M54.9 BACK PAIN, CHRONIC: Status: ACTIVE | Noted: 2018-04-25

## 2019-03-25 NOTE — PHYSICAL EXAM
[FreeTextEntry1] : General: NAD, alert\par Psych: normal mood and affect\par HEENT: NC/AT, normal visual tracking\par Pulmonary: no resp distress, chest expansion appears symmetrical\par CV: extremities are warm and perfused\par Abd: non-distended\par Ext: no c/c/e\par Breast: shoulder ROM full bilaterally, no subacromial or bicipital tenderness, no cervical spine pain, no axilla tenderness, no paresthesias in upper or lower extremities, 5/5\par \par Lumbar/Hip Spine:\par Inspection: normal muscle bulk without asymmetry\par Tenderness to palpation: TTP bilateral PSIS, lumbar paraspinal\par ROM: within functional limits and with pain in extension > flexion\par MMT: 5/5 bilateral lower extremities\par Reflexes: symmetric bilateral achilles and patella \par Sensory: dec to LT in stocking glove distribution otherwise intact to light touch in all dermatomes of the bilateral lower extremities.

## 2019-03-25 NOTE — REVIEW OF SYSTEMS
[Fatigue] : fatigue [Constipation] : constipation [Negative] : Heme/Lymph [FreeTextEntry5] : breast pain

## 2019-03-25 NOTE — HISTORY OF PRESENT ILLNESS
[FreeTextEntry1] : Ms. ANTELMO KHAN is a 67 year year old female here for follow up of low back pain, neuropathy. She has a history of chornic low back pain from work related injury in 2015. She has been followed with pain management, Dr. Griffin. She was managed effectively on oxycodone 10mg PO BID, lyrica. She has had interventional blocks in the past initially with limited success. She has had PT in the past. She has a history of breask Ca in 2005, s/p lumpectomy, CRT. She reports worsening neuropathic pain in her lower extremities.\par \par Location: axial low back, bilateral LE\par Duration: chronic\par Inciting Event/Context: work related injury in 2015\par Onset/Timing: constant, activities and certain days with worsneing pain\par Quality: sharp\par Severity: 3/10\par Exacerbating factors: activites\par Relieving factors: rest\par Radiation: intermittently into her lower extremities\par Numbness/Tingling: denies\par Bowel/Bladder neurological incontinence: denies\par lower ext. weakness: denies\par \par Prior Treatments: Physical Therapy, interventional blocks\par Surgeries: no prior lumbar surgery\par Medications: she is currently taking oxycodone 10mg PO 2 tabs PO BID. She reports that she had tried to taper off the medications in the past, had severe withdrawal effects and was unable to taper off the medications\par currently on cymbalta and lyrica

## 2019-03-25 NOTE — ASSESSMENT
[FreeTextEntry1] : Ms. KHAN is a 67 year year old woman here for follow up for low back pain, worsening neuropathic pain. She is currently on lyrica and cmbalta for her pain. Lyrica was recently increased on BID. Recommend continued uptitration of lyrica, monitoring of symptoms. Discussed coasting phenomenon and neuropathic pain control. Follow up in 1-2 months.

## 2019-04-07 PROBLEM — N64.52 BREAST DISCHARGE: Status: ACTIVE | Noted: 2018-02-15

## 2019-05-21 ENCOUNTER — OUTPATIENT (OUTPATIENT)
Dept: OUTPATIENT SERVICES | Facility: HOSPITAL | Age: 69
LOS: 1 days | Discharge: HOME | End: 2019-05-21

## 2019-05-21 DIAGNOSIS — J38.1 POLYP OF VOCAL CORD AND LARYNX: Chronic | ICD-10-CM

## 2019-05-21 DIAGNOSIS — Z90.710 ACQUIRED ABSENCE OF BOTH CERVIX AND UTERUS: Chronic | ICD-10-CM

## 2019-05-21 DIAGNOSIS — Z98.890 OTHER SPECIFIED POSTPROCEDURAL STATES: Chronic | ICD-10-CM

## 2019-05-21 DIAGNOSIS — C50.919 MALIGNANT NEOPLASM OF UNSPECIFIED SITE OF UNSPECIFIED FEMALE BREAST: ICD-10-CM

## 2019-05-21 DIAGNOSIS — Z90.13 ACQUIRED ABSENCE OF BILATERAL BREASTS AND NIPPLES: Chronic | ICD-10-CM

## 2019-05-23 ENCOUNTER — APPOINTMENT (OUTPATIENT)
Dept: PLASTIC SURGERY | Facility: CLINIC | Age: 69
End: 2019-05-23
Payer: MEDICARE

## 2019-05-23 ENCOUNTER — APPOINTMENT (OUTPATIENT)
Dept: HEMATOLOGY ONCOLOGY | Facility: CLINIC | Age: 69
End: 2019-05-23

## 2019-05-23 PROCEDURE — 99214 OFFICE O/P EST MOD 30 MIN: CPT

## 2019-05-23 NOTE — PHYSICAL EXAM
[de-identified] : b/l breasts soft, nt. left is positioned higher than the right but the skin envelope had received prior radiation. [de-identified] : abdomen soft, nt. incisions well healed no abdominal bulge or hernia.

## 2019-05-23 NOTE — REASON FOR VISIT
[FreeTextEntry1] : Patient s/p bilateral mastectomies and RIO flaps for bilateral breast cancer. Patient c/o left breast getting smaller in size otherwise no other complaints. [Follow-Up: _____] : a [unfilled] follow-up visit [Spouse] : spouse

## 2019-05-23 NOTE — HISTORY OF PRESENT ILLNESS
[FreeTextEntry1] : 67 y.o.  BRCA gene 1 positive female with history left breast CA initially diagnosed in  s/p partial mastectomy, chemotherapy, and post op RT; oncologist at that time was Dr. Sanchez at [facility] s/p diagnosis of left breast CA and right breast CA for which she underwent b/l mastectomies with SLNBx with RIO flap reconstruction, and hysterectomy with BSO on 18 . She completed post operative chemotherapy 2018. Pt presents today c.o. asymmetric breasts, she reports her left breast is smaller than her right and she is seeking revision of her reconstruction. \par \par Pt also with history of COPD and HTN, history of back pain on lyrica and seeing PM&R.

## 2019-05-23 NOTE — HISTORY OF PRESENT ILLNESS
[FreeTextEntry1] : 67 y.o.  BRCA gene 1 positive female with history left breast CA initially diagnosed in  s/p partial mastectomy, chemotherapy, and post op RT; oncologist at that time was Dr. Sanchez at [facility] s/p diagnosis of left breast CA and right breast CA for which she underwent b/l mastectomies with SLNBx with RIO flap reconstruction, and hysterectomy with BSO on 18 . She completed post operative chemotherapy 2018. Pt presents today c.o. asymmetric breasts, she reports her left breast is smaller than her right and she is seeking revision of her reconstruction. \par \par Pt also with history of COPD and HTN, history of back pain on lyrica and seeing PM&R.

## 2019-05-23 NOTE — PHYSICAL EXAM
[de-identified] : b/l breasts soft, nt. left is positioned higher than the right but the skin envelope had received prior radiation. [de-identified] : abdomen soft, nt. incisions well healed no abdominal bulge or hernia.

## 2019-11-03 NOTE — ADDENDUM
[FreeTextEntry1] : I, Ani Farris, acted solely as a scribe for Dr. Marilou Huerta on this date 2/21/19.  0.5

## 2019-12-12 ENCOUNTER — APPOINTMENT (OUTPATIENT)
Dept: OBGYN | Facility: CLINIC | Age: 69
End: 2019-12-12
Payer: MEDICARE

## 2019-12-12 VITALS
HEIGHT: 64 IN | WEIGHT: 163 LBS | BODY MASS INDEX: 27.83 KG/M2 | DIASTOLIC BLOOD PRESSURE: 76 MMHG | SYSTOLIC BLOOD PRESSURE: 136 MMHG

## 2019-12-12 DIAGNOSIS — M85.80 OTHER SPECIFIED DISORDERS OF BONE DENSITY AND STRUCTURE, UNSPECIFIED SITE: ICD-10-CM

## 2019-12-12 DIAGNOSIS — Z15.09 GENETIC SUSCEPTIBILITY TO MALIGNANT NEOPLASM OF BREAST: ICD-10-CM

## 2019-12-12 DIAGNOSIS — Z15.01 GENETIC SUSCEPTIBILITY TO MALIGNANT NEOPLASM OF BREAST: ICD-10-CM

## 2019-12-12 PROCEDURE — 82270 OCCULT BLOOD FECES: CPT

## 2019-12-12 PROCEDURE — 99214 OFFICE O/P EST MOD 30 MIN: CPT

## 2019-12-13 PROBLEM — M85.80 OSTEOPENIA: Status: ACTIVE | Noted: 2019-12-13

## 2019-12-13 NOTE — CHIEF COMPLAINT
[Follow Up] : follow up GYN visit [FreeTextEntry1] : brca carrier, here for monitoring. \par osteopenia

## 2019-12-13 NOTE — PHYSICAL EXAM
[Awake] : awake [Alert] : alert [Acute Distress] : no acute distress [Mass] : no breast mass [Nipple Discharge] : no nipple discharge [Soft] : soft [Axillary LAD] : no axillary lymphadenopathy [Tender] : non tender [Oriented x3] : oriented to person, place, and time [Normal] : vagina [No Bleeding] : there was no active vaginal bleeding [Absent] : absent [Adnexa Absent] : absent bilaterally [Occult Blood] : occult blood test from digital rectal exam was negative [RRR, No Murmurs] : RRR, no murmurs [CTAB] : CTAB

## 2019-12-19 LAB — CYTOLOGY CVX/VAG DOC THIN PREP: ABNORMAL

## 2020-08-25 NOTE — PATIENT PROFILE ADULT. - LONGEST PERIOD TOBACCO-FREE
Attemped to call report to CM, Phone just rang , Will attempt again later  13 years, quit   Pt smoked 1ppd x 40 years

## 2020-09-24 ENCOUNTER — APPOINTMENT (OUTPATIENT)
Dept: SURGERY | Facility: CLINIC | Age: 70
End: 2020-09-24
Payer: MEDICARE

## 2020-09-24 VITALS
SYSTOLIC BLOOD PRESSURE: 150 MMHG | TEMPERATURE: 97.9 F | OXYGEN SATURATION: 96 % | BODY MASS INDEX: 28.68 KG/M2 | HEART RATE: 64 BPM | HEIGHT: 64 IN | WEIGHT: 168 LBS | DIASTOLIC BLOOD PRESSURE: 84 MMHG

## 2020-09-24 DIAGNOSIS — Z90.13 ACQUIRED ABSENCE OF BILATERAL BREASTS AND NIPPLES: ICD-10-CM

## 2020-09-24 PROCEDURE — 99215 OFFICE O/P EST HI 40 MIN: CPT

## 2020-09-24 NOTE — PHYSICAL EXAM
[Normocephalic] : normocephalic [Atraumatic] : atraumatic [Supple] : supple [No Supraclavicular Adenopathy] : no supraclavicular adenopathy [Examined in the supine and seated position] : examined in the supine and seated position [No dominant masses] : no dominant masses in right breast  [No dominant masses] : no dominant masses left breast [No Nipple Retraction] : no left nipple retraction [No Axillary Lymphadenopathy] : no left axillary lymphadenopathy [No Edema] : no edema [No Rashes] : no rashes [No Ulceration] : no ulceration [de-identified] : Mastectomy flap well healed. [de-identified] : hardening in the lateral left breast inframammary fold.

## 2020-09-24 NOTE — ASSESSMENT
[FreeTextEntry1] : 71 yo postmenopausal female diagnosed with a second reoccurrence of left breast cancer.  9 mm left breast 6:00 IDC grade 3 ER - (0%) Pr + (2%)  Her2 -(negative) and  right breast 12:00 IDC with DCIS, high nuclear grade, triple negative. She is reporting new onset for the past 4 month left lateral breast pain with hardening of the breast.  Recommendation for:\par 1.  MR breast\par 2. Follow up with Dr. Huerta\par 3. Follow up in 2-3 months\par 4.  PT for post mastectomy pain

## 2020-10-26 ENCOUNTER — RESULT REVIEW (OUTPATIENT)
Age: 70
End: 2020-10-26

## 2020-10-26 ENCOUNTER — APPOINTMENT (OUTPATIENT)
Dept: MRI IMAGING | Facility: CLINIC | Age: 70
End: 2020-10-26
Payer: MEDICARE

## 2020-10-26 ENCOUNTER — OUTPATIENT (OUTPATIENT)
Dept: OUTPATIENT SERVICES | Facility: HOSPITAL | Age: 70
LOS: 1 days | End: 2020-10-26
Payer: MEDICARE

## 2020-10-26 DIAGNOSIS — Z98.890 OTHER SPECIFIED POSTPROCEDURAL STATES: Chronic | ICD-10-CM

## 2020-10-26 DIAGNOSIS — J38.1 POLYP OF VOCAL CORD AND LARYNX: Chronic | ICD-10-CM

## 2020-10-26 DIAGNOSIS — Z90.13 ACQUIRED ABSENCE OF BILATERAL BREASTS AND NIPPLES: Chronic | ICD-10-CM

## 2020-10-26 DIAGNOSIS — Z90.13 ACQUIRED ABSENCE OF BILATERAL BREASTS AND NIPPLES: ICD-10-CM

## 2020-10-26 DIAGNOSIS — C50.912 MALIGNANT NEOPLASM OF UNSPECIFIED SITE OF LEFT FEMALE BREAST: ICD-10-CM

## 2020-10-26 DIAGNOSIS — Z90.710 ACQUIRED ABSENCE OF BOTH CERVIX AND UTERUS: Chronic | ICD-10-CM

## 2020-10-26 DIAGNOSIS — C50.911 MALIGNANT NEOPLASM OF UNSPECIFIED SITE OF RIGHT FEMALE BREAST: ICD-10-CM

## 2020-10-26 PROCEDURE — C8908: CPT

## 2020-10-26 PROCEDURE — C8937: CPT

## 2020-10-26 PROCEDURE — A9585: CPT

## 2020-10-26 PROCEDURE — 77049 MRI BREAST C-+ W/CAD BI: CPT | Mod: 26

## 2020-11-09 ENCOUNTER — NON-APPOINTMENT (OUTPATIENT)
Age: 70
End: 2020-11-09

## 2020-11-17 ENCOUNTER — OUTPATIENT (OUTPATIENT)
Dept: OUTPATIENT SERVICES | Facility: HOSPITAL | Age: 70
LOS: 1 days | End: 2020-11-17

## 2020-11-17 DIAGNOSIS — Z98.890 OTHER SPECIFIED POSTPROCEDURAL STATES: Chronic | ICD-10-CM

## 2020-11-17 DIAGNOSIS — Z90.13 ACQUIRED ABSENCE OF BILATERAL BREASTS AND NIPPLES: Chronic | ICD-10-CM

## 2020-11-17 DIAGNOSIS — J38.1 POLYP OF VOCAL CORD AND LARYNX: Chronic | ICD-10-CM

## 2020-11-17 DIAGNOSIS — Z90.710 ACQUIRED ABSENCE OF BOTH CERVIX AND UTERUS: Chronic | ICD-10-CM

## 2020-11-17 DIAGNOSIS — Z00.8 ENCOUNTER FOR OTHER GENERAL EXAMINATION: ICD-10-CM

## 2020-11-17 DIAGNOSIS — N64.89 OTHER SPECIFIED DISORDERS OF BREAST: ICD-10-CM

## 2020-12-01 ENCOUNTER — APPOINTMENT (OUTPATIENT)
Dept: ULTRASOUND IMAGING | Facility: CLINIC | Age: 70
End: 2020-12-01

## 2020-12-07 ENCOUNTER — NON-APPOINTMENT (OUTPATIENT)
Age: 70
End: 2020-12-07

## 2021-06-01 ENCOUNTER — APPOINTMENT (OUTPATIENT)
Dept: OBGYN | Facility: CLINIC | Age: 71
End: 2021-06-01
Payer: MEDICARE

## 2021-06-01 VITALS
BODY MASS INDEX: 27.66 KG/M2 | DIASTOLIC BLOOD PRESSURE: 80 MMHG | HEIGHT: 64 IN | WEIGHT: 162 LBS | SYSTOLIC BLOOD PRESSURE: 120 MMHG

## 2021-06-01 DIAGNOSIS — N76.0 ACUTE VAGINITIS: ICD-10-CM

## 2021-06-01 DIAGNOSIS — B96.89 ACUTE VAGINITIS: ICD-10-CM

## 2021-06-01 PROCEDURE — 99214 OFFICE O/P EST MOD 30 MIN: CPT

## 2021-06-01 PROCEDURE — 99072 ADDL SUPL MATRL&STAF TM PHE: CPT

## 2021-06-01 NOTE — PHYSICAL EXAM
[Labia Majora] : normal [Labia Minora] : normal [Normal] : normal [Atrophy] : atrophy [Discharge] : a  ~M vaginal discharge was present [Scant] : scant [Foul Smelling] : foul smelling [White] : white [Absent] : absent [Uterine Adnexae] : absent

## 2021-06-01 NOTE — HISTORY OF PRESENT ILLNESS
[FreeTextEntry1] : 71 yo pt here for eval of foul smelling vaginal discharge that keeps returning despite washing for several weeks. . Declining covid vaccine because she fears she is developing dementia, and would prefer succumbing to covid to ending up in a nursing home with dementia.

## 2021-06-04 LAB — BACTERIA GENITAL AEROBE CULT: NORMAL

## 2021-11-05 NOTE — REVIEW OF SYSTEMS
Refill levothyroxine and lisinopril. [FreeTextEntry2] : see interval hx, otherwise remainder of ROS is negative.

## 2022-01-17 ENCOUNTER — APPOINTMENT (OUTPATIENT)
Dept: RADIOLOGY | Facility: CLINIC | Age: 72
End: 2022-01-17
Payer: MEDICARE

## 2022-01-17 ENCOUNTER — OUTPATIENT (OUTPATIENT)
Dept: OUTPATIENT SERVICES | Facility: HOSPITAL | Age: 72
LOS: 1 days | End: 2022-01-17
Payer: MEDICARE

## 2022-01-17 DIAGNOSIS — J38.1 POLYP OF VOCAL CORD AND LARYNX: Chronic | ICD-10-CM

## 2022-01-17 DIAGNOSIS — Z98.890 OTHER SPECIFIED POSTPROCEDURAL STATES: Chronic | ICD-10-CM

## 2022-01-17 DIAGNOSIS — Z90.13 ACQUIRED ABSENCE OF BILATERAL BREASTS AND NIPPLES: Chronic | ICD-10-CM

## 2022-01-17 DIAGNOSIS — Z90.710 ACQUIRED ABSENCE OF BOTH CERVIX AND UTERUS: Chronic | ICD-10-CM

## 2022-01-17 DIAGNOSIS — Z00.8 ENCOUNTER FOR OTHER GENERAL EXAMINATION: ICD-10-CM

## 2022-01-17 PROCEDURE — 72070 X-RAY EXAM THORAC SPINE 2VWS: CPT

## 2022-01-17 PROCEDURE — 72070 X-RAY EXAM THORAC SPINE 2VWS: CPT | Mod: 26

## 2022-01-17 PROCEDURE — 72110 X-RAY EXAM L-2 SPINE 4/>VWS: CPT | Mod: 26

## 2022-01-17 PROCEDURE — 72110 X-RAY EXAM L-2 SPINE 4/>VWS: CPT

## 2022-02-08 ENCOUNTER — APPOINTMENT (OUTPATIENT)
Dept: SURGERY | Facility: CLINIC | Age: 72
End: 2022-02-08
Payer: MEDICARE

## 2022-02-08 VITALS
HEIGHT: 64 IN | BODY MASS INDEX: 29.37 KG/M2 | OXYGEN SATURATION: 96 % | DIASTOLIC BLOOD PRESSURE: 81 MMHG | TEMPERATURE: 97.4 F | SYSTOLIC BLOOD PRESSURE: 146 MMHG | WEIGHT: 172 LBS | HEART RATE: 85 BPM

## 2022-02-08 DIAGNOSIS — Z15.01 GENETIC SUSCEPTIBILITY TO MALIGNANT NEOPLASM OF BREAST: ICD-10-CM

## 2022-02-08 DIAGNOSIS — Z15.09 GENETIC SUSCEPTIBILITY TO MALIGNANT NEOPLASM OF BREAST: ICD-10-CM

## 2022-02-08 PROCEDURE — 99215 OFFICE O/P EST HI 40 MIN: CPT

## 2022-02-08 NOTE — PHYSICAL EXAM
[Normocephalic] : normocephalic [Atraumatic] : atraumatic [Supple] : supple [No Supraclavicular Adenopathy] : no supraclavicular adenopathy [Examined in the supine and seated position] : examined in the supine and seated position [No dominant masses] : no dominant masses in right breast  [No dominant masses] : no dominant masses left breast [No Nipple Retraction] : no left nipple retraction [No Axillary Lymphadenopathy] : no left axillary lymphadenopathy [No Edema] : no edema [No Rashes] : no rashes [No Ulceration] : no ulceration [de-identified] : Mastectomy flap well healed. [de-identified] : hardening in the lateral left breast inframammary fold.

## 2022-02-08 NOTE — HISTORY OF PRESENT ILLNESS
[FreeTextEntry1] : I had the pleasure of seeing Jael Knowles in the office for a clinical breast exam.  She is s/p bilateral mastectomy, bilateral SLNB, hysterectomy (Dr. Menendez) and RIO procedure( Dr. Whitfield)  2018.\par \par  She has a history of stage II (T2N0) triple negative poorly differentiated left breast cancer in 2005 at age 54, s/p lumpectomy with SLNB (Dr. Granda), AC-Taxotere, and radiation.  Her oncologist was Dr. Sanchez at [facility].  \par \par She was diagnosed in 2018  with a 9 mm left breast at the 6:00 position IDC poorly differentiated ER 0% ID 20% Her2 negative in 2018 due to bloody nipple discharge.  She then underwent MRI and was recommended to undergo right breast biopsy.  Biopsy results demonstrated 9 mm right breast  12:00  mm IDC poorly differentiated  and DCIS high nuclear grade ER 0% ID 0% Her2 negative. She also underwent Genetic testing and was BRCA 1 positive.\par \par She underwent bilateral mastectomy with bilateral SLNB on 2018.  Final pathology demonstrated right mastectomy with invasive tumor measuring 0.2 cm and 0/2 lymh nodes negative for carcinoma.  Left mastectomy demonstrated  the invasive tumor measuring 0.9 cm with 0/2 lymph nodes negative for carcinoma.\par \par  with 1st delivery at 20.  Menses began at age 15.\par She has a personal history with left breast cancer .\par Family history is significant for maternal grandmother being diagnosed with breast cancer, 2 maternal aunts with breast cancer and mother diagnosed with breast cancer and father diagnosed with throat cancer.\par \par 3/23/2018 Genetics: [facility] BRCA1 Postive\par \par Pathology:\par 3/20/2018  Final Diagnosis:  Breast, left, 6:00, 1 cmfn, US guided core biopsy:  Invasive poorly differentiated ductal carcinoma.  Nicole score 8/9 (3+3+2) in this limited material.  Invasive tumor measures at least 9mm.  Microcalcification absent.  Lymphovascular permeation by tumor not seen.  ER negative ID Positive 20% HER2 Negative.   She underwent bilateral mastectomy, bilateral SLNB, hysterectomy (Dr. Menendez) and RIO procedure( Dr. Whitfield)  2018.\par \par 5/15/2018  Final Diagnosis:  Breast, right, 12:00, MRI guided core biopsy: Invasive poorly differentiated ductal carcinoma.  [place] score 8/9( 3+3+2) in this limited study.  Invasive tumor measures at least 2mm.  Ductal carcinoma in situ, solid pattern, high grade nuclear atypia.  Lymphovascular permeation by tumor not seen.  ER 0% ID 0% Her2 0%\par \par 2018 FINAL SURGICAL PATHOLOGY:\par 1. [place] lymph node, left #1 biopsy: One (1) lymph node, no tumor Identified.\par 2. [place] lymph node, left #2 Biopsy:  One (1) lymph node, no tumor Identified\par 3. [place] lymph node, right #1 biopsy:  Two lymph node, no tumor idenitified.\par 4. Left fallopian tube and ovary, salpingoophrectomy:  Ovary cyst and fallopian tube, no abnormality seen.\par 5.  Right fallopian tube and ovary, salpingoophrectomy: Ovary cyst and fallopian tube, no abnormality seen\par 6. Additional left axillary content, excision: One lymph node, no tumor identified.\par 8. Left breast mastectomy: Infiltrating ductal carcinoma, poorly differentiated. Tumor size 0.9 cm. Intraductal carcinoma (DCIS), focal, solid type, high nuclear grade.  Nipple and skin, no tumor identified.  Fibroadenomatoid nodule.\par 9.  Left deep palpable pectoralis muscle, excision: Fibroadipose and muscular tissue, no tumor identified.  \par 10.  Left internal mammary lymph node, biopsy: Fatty tissue with small lymphoid aggregate, no tumor identified.\par 11. Right breast mastectomy: Focus of residual infiltrating ductal carcinoma (0.2 cm).  Biopsy site with fibrosis, fat necrosis and atypical ductal carcinoma.  Fibroproliferative changes including, duct epithelial hyperplasia, adenosis, apocrine metaplasia, fibroadenomatoid nodule and cysts.  Nipple and skin, no tumor identified.\par \par 10/26/2020  MR breast \par IMPRESSION:Small focus of enhancement in the approximate lower inner aspect of the reconstructed left breast. This probably reflects fat necrosis. As this is the patient's baseline postoperative examination, recommend targeted left breast ultrasound for further evaluation and to exclude a suspicious correlate. If the focus is sonographically occult, recommend follow-up MRI evaluation in 6 months.\par RECOMMENDATION: Additional Imaging. BI-RADS 3-Probably Benign Finding(s)\par \par She reports not undergoing imaging or seeing physicians because she is tired of going to multiple appointments and imaging due to findings on imaging and symptoms.  She is aware she is overdue for MR breast and US but wants to undergo a full body scan due to new onset of left breast pain which radiates to her back.  She states this has been ongoing for the past 6 months but recently in the past 4 weeks it has intensified where she cannot get up.  She had undergone x-rays of her spine which did not demonstrate suspicious findings.  Today her breast exam is benign.  Recommendation for CT C/A/P + bone scan.  She wants to followup with medical oncology and will follow up after imaging.  If imaging is benign then follow up in 3 months and will send to Dr. Tse if patient agrees.\par \par Ob-Gyn:  Dr. Keysha Minaya\par PCP:  Dr. Jaguar Fish\par Medical oncologist:  Dr. Jovani Sanchez\par Breast surgeon:  Dr. Jovani Granda\par Pain management:  Dr. Esquivel

## 2022-02-08 NOTE — ASSESSMENT
[FreeTextEntry1] : 70 yo BRCA1 positive with history of stage IIA left beast TNBC s/p lumpectomy in 2005, adjuvant chemotherapy and RT.  Presented in 2018 with new bilateral 2nd breast primaries s/p mastectomy + CORA/BSO on 6/18/2018.  She reports not undergoing imaging or seeing physicians because she is tired of going to multiple appointments and imaging due to findings on imaging and symptoms.  She is aware she is overdue for MR breast and US but wants to undergo a full body scan due to new onset of left breast pain which radiates to her back.  She states this has been ongoing for the past 6 months but recently in the past 4 weeks it has intensified where she cannot get up.  She had undergone x-rays of her spine which did not demonstrate suspicious findings.  Today her breast exam is benign.  Recommendation for CT C/A/P + bone scan.  She wants to followup with medical oncology and will follow up after imaging.  If imaging is benign then follow up in 3 months and will send to Dr. Tse if patient agrees.\par 1.  MR breast- declined at this time\par 2. Follow up with Dr. Huerta\par 3. Follow up in 2-3 months\par 4.  CT C/A/P + Bone scna

## 2022-02-09 ENCOUNTER — RESULT REVIEW (OUTPATIENT)
Age: 72
End: 2022-02-09

## 2022-02-25 ENCOUNTER — APPOINTMENT (OUTPATIENT)
Dept: NUCLEAR MEDICINE | Facility: CLINIC | Age: 72
End: 2022-02-25
Payer: MEDICARE

## 2022-02-25 ENCOUNTER — APPOINTMENT (OUTPATIENT)
Dept: CT IMAGING | Facility: CLINIC | Age: 72
End: 2022-02-25
Payer: MEDICARE

## 2022-02-25 ENCOUNTER — OUTPATIENT (OUTPATIENT)
Dept: OUTPATIENT SERVICES | Facility: HOSPITAL | Age: 72
LOS: 1 days | End: 2022-02-25

## 2022-02-25 DIAGNOSIS — C50.912 MALIGNANT NEOPLASM OF UNSPECIFIED SITE OF LEFT FEMALE BREAST: ICD-10-CM

## 2022-02-25 DIAGNOSIS — Z98.890 OTHER SPECIFIED POSTPROCEDURAL STATES: Chronic | ICD-10-CM

## 2022-02-25 DIAGNOSIS — Z90.710 ACQUIRED ABSENCE OF BOTH CERVIX AND UTERUS: Chronic | ICD-10-CM

## 2022-02-25 DIAGNOSIS — J38.1 POLYP OF VOCAL CORD AND LARYNX: Chronic | ICD-10-CM

## 2022-02-25 DIAGNOSIS — Z90.13 ACQUIRED ABSENCE OF BILATERAL BREASTS AND NIPPLES: Chronic | ICD-10-CM

## 2022-02-25 PROCEDURE — 71260 CT THORAX DX C+: CPT | Mod: 26

## 2022-02-25 PROCEDURE — 78306 BONE IMAGING WHOLE BODY: CPT | Mod: 26

## 2022-02-25 PROCEDURE — 74177 CT ABD & PELVIS W/CONTRAST: CPT | Mod: 26

## 2022-03-22 ENCOUNTER — OUTPATIENT (OUTPATIENT)
Dept: OUTPATIENT SERVICES | Facility: HOSPITAL | Age: 72
LOS: 1 days | Discharge: HOME | End: 2022-03-22

## 2022-03-22 DIAGNOSIS — C50.919 MALIGNANT NEOPLASM OF UNSPECIFIED SITE OF UNSPECIFIED FEMALE BREAST: ICD-10-CM

## 2022-03-22 DIAGNOSIS — Z90.13 ACQUIRED ABSENCE OF BILATERAL BREASTS AND NIPPLES: Chronic | ICD-10-CM

## 2022-03-22 DIAGNOSIS — Z98.890 OTHER SPECIFIED POSTPROCEDURAL STATES: Chronic | ICD-10-CM

## 2022-03-22 DIAGNOSIS — J38.1 POLYP OF VOCAL CORD AND LARYNX: Chronic | ICD-10-CM

## 2022-03-22 DIAGNOSIS — Z90.710 ACQUIRED ABSENCE OF BOTH CERVIX AND UTERUS: Chronic | ICD-10-CM

## 2022-03-24 ENCOUNTER — APPOINTMENT (OUTPATIENT)
Dept: HEMATOLOGY ONCOLOGY | Facility: CLINIC | Age: 72
End: 2022-03-24
Payer: MEDICARE

## 2022-03-24 ENCOUNTER — RESULT REVIEW (OUTPATIENT)
Age: 72
End: 2022-03-24

## 2022-03-24 VITALS
HEART RATE: 77 BPM | BODY MASS INDEX: 31.07 KG/M2 | WEIGHT: 182.01 LBS | SYSTOLIC BLOOD PRESSURE: 149 MMHG | HEIGHT: 64 IN | DIASTOLIC BLOOD PRESSURE: 87 MMHG | OXYGEN SATURATION: 94 %

## 2022-03-24 LAB
BASOPHILS # BLD AUTO: 0.1 K/UL — SIGNIFICANT CHANGE UP (ref 0–0.2)
BASOPHILS NFR BLD AUTO: 1.8 % — SIGNIFICANT CHANGE UP (ref 0–2)
EOSINOPHIL # BLD AUTO: 0.2 K/UL — SIGNIFICANT CHANGE UP (ref 0–0.5)
EOSINOPHIL NFR BLD AUTO: 3.6 % — SIGNIFICANT CHANGE UP (ref 0–6)
HCT VFR BLD CALC: 41.3 % — SIGNIFICANT CHANGE UP (ref 34.5–45)
HGB BLD-MCNC: 13.1 G/DL — SIGNIFICANT CHANGE UP (ref 11.5–15.5)
LYMPHOCYTES # BLD AUTO: 2 K/UL — SIGNIFICANT CHANGE UP (ref 1–3.3)
LYMPHOCYTES # BLD AUTO: 29.6 % — SIGNIFICANT CHANGE UP (ref 13–44)
MCHC RBC-ENTMCNC: 30.5 PG — SIGNIFICANT CHANGE UP (ref 27–34)
MCHC RBC-ENTMCNC: 31.6 G/DL — LOW (ref 32–36)
MCV RBC AUTO: 96.5 FL — SIGNIFICANT CHANGE UP (ref 80–100)
MONOCYTES # BLD AUTO: 0.4 K/UL — SIGNIFICANT CHANGE UP (ref 0–0.9)
MONOCYTES NFR BLD AUTO: 6.7 % — SIGNIFICANT CHANGE UP (ref 2–14)
NEUTROPHILS # BLD AUTO: 3.9 K/UL — SIGNIFICANT CHANGE UP (ref 1.8–7.4)
NEUTROPHILS NFR BLD AUTO: 58.3 % — SIGNIFICANT CHANGE UP (ref 43–77)
PLATELET # BLD AUTO: 205 K/UL — SIGNIFICANT CHANGE UP (ref 150–400)
RBC # BLD: 4.28 M/UL — SIGNIFICANT CHANGE UP (ref 3.8–5.2)
RBC # FLD: 13 % — SIGNIFICANT CHANGE UP (ref 10.3–14.5)
WBC # BLD: 6.7 K/UL — SIGNIFICANT CHANGE UP (ref 3.8–10.5)
WBC # FLD AUTO: 6.7 K/UL — SIGNIFICANT CHANGE UP (ref 3.8–10.5)

## 2022-03-24 PROCEDURE — 99214 OFFICE O/P EST MOD 30 MIN: CPT

## 2022-03-24 RX ORDER — MAGNESIUM 200 MG
1000 TABLET ORAL
Qty: 30 | Refills: 3 | Status: DISCONTINUED | COMMUNITY
Start: 2019-02-22 | End: 2022-03-24

## 2022-03-24 RX ORDER — PREGABALIN 75 MG/1
75 CAPSULE ORAL
Refills: 0 | Status: DISCONTINUED | COMMUNITY
End: 2022-03-24

## 2022-03-24 RX ORDER — FOLIC ACID 1 MG/1
1 TABLET ORAL DAILY
Qty: 60 | Refills: 1 | Status: DISCONTINUED | COMMUNITY
Start: 2019-02-22 | End: 2022-03-24

## 2022-03-24 RX ORDER — METRONIDAZOLE 7.5 MG/G
0.75 GEL VAGINAL
Qty: 1 | Refills: 1 | Status: DISCONTINUED | COMMUNITY
Start: 2021-06-01 | End: 2022-03-24

## 2022-03-24 RX ORDER — METOPROLOL TARTRATE 50 MG/1
50 TABLET, FILM COATED ORAL
Refills: 0 | Status: ACTIVE | COMMUNITY
Start: 2022-03-24

## 2022-03-24 RX ORDER — SENNOSIDES 15 MG/1
TABLET, COATED ORAL
Refills: 0 | Status: DISCONTINUED | COMMUNITY
End: 2022-03-24

## 2022-03-24 RX ORDER — DULOXETINE HYDROCHLORIDE 30 MG/1
30 CAPSULE, DELAYED RELEASE ORAL
Refills: 0 | Status: ACTIVE | COMMUNITY

## 2022-03-24 RX ORDER — ZOLPIDEM TARTRATE 10 MG/1
10 TABLET ORAL
Qty: 30 | Refills: 0 | Status: DISCONTINUED | COMMUNITY
Start: 2018-06-11 | End: 2022-03-24

## 2022-03-24 RX ORDER — FERROUS SULFATE 137(45) MG
142 (45 FE) TABLET, EXTENDED RELEASE ORAL
Qty: 30 | Refills: 3 | Status: DISCONTINUED | COMMUNITY
Start: 2019-02-22 | End: 2022-03-24

## 2022-03-25 ENCOUNTER — NON-APPOINTMENT (OUTPATIENT)
Age: 72
End: 2022-03-25

## 2022-03-25 LAB
ALBUMIN SERPL ELPH-MCNC: 4.2 G/DL
ALP BLD-CCNC: 111 U/L
ALT SERPL-CCNC: 17 U/L
ANION GAP SERPL CALC-SCNC: 14 MMOL/L
AST SERPL-CCNC: 22 U/L
BILIRUB SERPL-MCNC: 0.2 MG/DL
BUN SERPL-MCNC: 11 MG/DL
CALCIUM SERPL-MCNC: 9.5 MG/DL
CHLORIDE SERPL-SCNC: 100 MMOL/L
CO2 SERPL-SCNC: 28 MMOL/L
CREAT SERPL-MCNC: 1.03 MG/DL
EGFR: 58 ML/MIN/1.73M2
FERRITIN SERPL-MCNC: 27 NG/ML
FOLATE SERPL-MCNC: 14.3 NG/ML
GLUCOSE SERPL-MCNC: 113 MG/DL
IRON SATN MFR SERPL: 18 %
IRON SERPL-MCNC: 64 UG/DL
POTASSIUM SERPL-SCNC: 3.8 MMOL/L
PROT SERPL-MCNC: 6.7 G/DL
SODIUM SERPL-SCNC: 141 MMOL/L
TIBC SERPL-MCNC: 350 UG/DL
UIBC SERPL-MCNC: 286 UG/DL
VIT B12 SERPL-MCNC: 798 PG/ML

## 2022-03-25 RX ORDER — CHOLECALCIFEROL (VITAMIN D3) 10(400)/ML
160 (50 FE) DROPS ORAL
Qty: 90 | Refills: 1 | Status: ACTIVE | COMMUNITY
Start: 2022-03-25 | End: 1900-01-01

## 2022-03-25 NOTE — HISTORY OF PRESENT ILLNESS
[Disease: _____________________] : Disease: [unfilled] [T: ___] : T[unfilled] [N: ___] : N[unfilled] [AJCC Stage: ____] : AJCC Stage: [unfilled] [de-identified] : Ms. Knowles was diagnosed with left breast cancer at age 67 in March 2018.  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  Her oncologist was Dr. Sanchez at [facility].  \par \par She initially had left blood nipple discharge for 1 year and 2/2017 mammogram + ultrasound showed benign findings. \par Subsequent mammogram + ultrasound on 3/15/18 showed developing density in left retroareolar breast, and US confirming 0.7 x 0.6 x 0.9 cm hypoechoic nodule at 6:00, 1 cmfn with angulated margins.  \par \par 3/20/18 left breast 6:00 core biopsy, 1 cmfn: invasive poorly differentiated ductal carcinoma, Nicole score 8/9, measuring at least 9 cm. ER 0%, SD 20%, HER2 1+ (negative).\par \par 4/5/18 CT abd/pelvis - no lymphadenopathy or metastatic disease within abdomen or pelvis.  RLL 0.7 cm subpleural pulmonary nodule.\par \par 4/16/18 MRI breast - 1.0 cm irregular mass at the 6:00 axis of the left breast, corresponding to newly diagnosed malignancy. There is irregular non mass enhancement \par extending lateral to the index carcinoma and superiorly and anteriorly from the index carcinoma to involve the nipple, concerning for nipple involvement of disease.\par Prominent 9 mm enhancing focus at the 12:00 position of the right breast. If it would alter management, right breast MR guided core biopsy can be performed for further evaluation.\par \par On 5/15/18 she had MRI guided core biopsy of right breast 12:00 --> pathology invasive poorly differentiated ductal carcinoma, [place] 8/9, measuring at least 2mm, DCIS, solid pattern, high grade nuclear atypia.  ER 0%, SD 0%, HER2 negative. \par \par S/p bilateral mastectomy + SNL biopsies and CORA+BSO on 6/18/18. \par Pathology: \par 1. Left mastectomy - 0.9 cm IDC, Nicole 9/9, margins negative, none of 3 lymph nodes involved. ER 0%, SD 20%, HER2 negative.  pT1b pN0\par 2. Right mastectomy - 0.2cm IDC, [place] 9/9, margins negative, none of 3 lymph nodes involved. ER 0%, SD 0%, HER2 negative. pT1a pN0\par 3. CORA+BSO - negative for malignancy\par \par Family history is significant for maternal grandmother being diagnosed with breast cancer, 2 maternal aunts with breast cancer and mother diagnosed with breast cancer and father diagnosed with throat cancer.\par \par 3/23/18 [facility] genetic results - BRCA1 mutation [id number]\par \par Dr. Esquivel - pain management in [place] (following for back injury)\par \par Treatment summary:\par 8/10/18 - 10/12/18  - 4 cycles of Docetaxel 75 mg/m2 and Cytoxan 600 mg/m2  [de-identified] : invasive ductal [de-identified] : ER 0%, PR20%, HER2 negative [de-identified] : Patient returns for delayed follow up. Last seen in 2/2019\par She continues to have bilateral sharp neuropathic pain in her feet, worse at night. Currently on Cymbalta, 30 mg, TID \par Reports chronic back pain. Notes "stabbing" pain in upper back. Taking Oxycodone. \par Difficulty urinating\par Chronic left leg swelling \par \par \par

## 2022-03-25 NOTE — PHYSICAL EXAM
[Ambulatory and capable of all self care but unable to carry out any work activities] : Status 2- Ambulatory and capable of all self care but unable to carry out any work activities. Up and about more than 50% of waking hours [Normal] : well developed, well nourished, in no acute distress [de-identified] : no edema

## 2022-03-25 NOTE — ADDENDUM
[FreeTextEntry1] : Documented by Michael Ramirez acting as scribe for Dr. Huerta on 03/24/2022. \par \par All Medical record entries made by the Scribe were at my, Dr. Huerta's, direction and personally dictated by me on 03/24/2022. I have reviewed the chart and agree that the record accurately reflects my personal performance of the history, physical exam, assessment and plan. I have also personally directed, reviewed, and agreed with the discharge instructions.

## 2022-03-25 NOTE — ASSESSMENT
[FreeTextEntry1] : 71 year old female, BRCA 1 positive, with history of stage IIA left breast TNBC s/p lumpectomy in 2005, adjuvant AC-Taxotere, and RT.  Developed bilateral breast primaries s/p bilateral mastectomy + CORA-BSO on 6/18/18.\par 1. Left IDC - 0.9 cm, grade 3, ER 0%, IN 20%, HER2 negative.  pT1b pN0 (stage IB)\par 2. Right IDC- 0.2cm, grade 3,  ER 0%, IN 0%, HER2 negative. pT1a pN0 (stage IB)\par \par Completed 4 cycles of TC on 10/12/18. \par \par She continues to have neuropathy of feet likely related to chemotherapy.   \par  2/25/22 CT CAP: No acute thoracic or abdominopelvic pathology. No evidence of thoracic or abdominopelvic metastatic disease.\par No aggressive osseous lesions.\par Bone scan - no osseous mets.\par \par Plan:\par - Neuropathy: Continue Cymbalta 30 mg, TID. Will check B12.\par -Low folate 4.8 ng/ml preivously - repeat levels today\par -repeat iron panel as well give low ferritin of 8 ng/ml on prior visit in 2019 \par - Follow up in 6 months.

## 2022-06-14 ENCOUNTER — APPOINTMENT (OUTPATIENT)
Dept: VASCULAR SURGERY | Facility: CLINIC | Age: 72
End: 2022-06-14

## 2022-08-02 ENCOUNTER — APPOINTMENT (OUTPATIENT)
Dept: GASTROENTEROLOGY | Facility: CLINIC | Age: 72
End: 2022-08-02

## 2022-08-05 ENCOUNTER — APPOINTMENT (OUTPATIENT)
Dept: VASCULAR SURGERY | Facility: CLINIC | Age: 72
End: 2022-08-05

## 2022-08-05 VITALS
HEIGHT: 64 IN | SYSTOLIC BLOOD PRESSURE: 154 MMHG | BODY MASS INDEX: 30.39 KG/M2 | WEIGHT: 178 LBS | TEMPERATURE: 97.8 F | HEART RATE: 68 BPM | RESPIRATION RATE: 16 BRPM | DIASTOLIC BLOOD PRESSURE: 80 MMHG | OXYGEN SATURATION: 94 %

## 2022-08-05 PROCEDURE — 99203 OFFICE O/P NEW LOW 30 MIN: CPT

## 2022-08-08 ENCOUNTER — APPOINTMENT (OUTPATIENT)
Dept: VASCULAR SURGERY | Facility: CLINIC | Age: 72
End: 2022-08-08

## 2022-08-30 ENCOUNTER — APPOINTMENT (OUTPATIENT)
Dept: SURGERY | Facility: CLINIC | Age: 72
End: 2022-08-30

## 2022-09-14 ENCOUNTER — OUTPATIENT (OUTPATIENT)
Dept: OUTPATIENT SERVICES | Facility: HOSPITAL | Age: 72
LOS: 1 days | Discharge: HOME | End: 2022-09-14

## 2022-09-14 DIAGNOSIS — Z98.890 OTHER SPECIFIED POSTPROCEDURAL STATES: Chronic | ICD-10-CM

## 2022-09-14 DIAGNOSIS — J38.1 POLYP OF VOCAL CORD AND LARYNX: Chronic | ICD-10-CM

## 2022-09-14 DIAGNOSIS — Z90.13 ACQUIRED ABSENCE OF BILATERAL BREASTS AND NIPPLES: Chronic | ICD-10-CM

## 2022-09-14 DIAGNOSIS — Z90.710 ACQUIRED ABSENCE OF BOTH CERVIX AND UTERUS: Chronic | ICD-10-CM

## 2022-09-14 DIAGNOSIS — C50.919 MALIGNANT NEOPLASM OF UNSPECIFIED SITE OF UNSPECIFIED FEMALE BREAST: ICD-10-CM

## 2022-09-20 ENCOUNTER — RESULT REVIEW (OUTPATIENT)
Age: 72
End: 2022-09-20

## 2022-09-20 ENCOUNTER — APPOINTMENT (OUTPATIENT)
Dept: HEMATOLOGY ONCOLOGY | Facility: CLINIC | Age: 72
End: 2022-09-20

## 2022-09-20 VITALS
HEIGHT: 64.17 IN | BODY MASS INDEX: 29.71 KG/M2 | WEIGHT: 174 LBS | OXYGEN SATURATION: 93 % | SYSTOLIC BLOOD PRESSURE: 126 MMHG | DIASTOLIC BLOOD PRESSURE: 81 MMHG | HEART RATE: 69 BPM

## 2022-09-20 LAB
BASOPHILS # BLD AUTO: 0.1 K/UL — SIGNIFICANT CHANGE UP (ref 0–0.2)
BASOPHILS NFR BLD AUTO: 0.8 % — SIGNIFICANT CHANGE UP (ref 0–2)
EOSINOPHIL # BLD AUTO: 0.2 K/UL — SIGNIFICANT CHANGE UP (ref 0–0.5)
EOSINOPHIL NFR BLD AUTO: 3.2 % — SIGNIFICANT CHANGE UP (ref 0–6)
HCT VFR BLD CALC: 42.9 % — SIGNIFICANT CHANGE UP (ref 34.5–45)
HGB BLD-MCNC: 14.2 G/DL — SIGNIFICANT CHANGE UP (ref 11.5–15.5)
LYMPHOCYTES # BLD AUTO: 2 K/UL — SIGNIFICANT CHANGE UP (ref 1–3.3)
LYMPHOCYTES # BLD AUTO: 27.2 % — SIGNIFICANT CHANGE UP (ref 13–44)
MCHC RBC-ENTMCNC: 30.7 PG — SIGNIFICANT CHANGE UP (ref 27–34)
MCHC RBC-ENTMCNC: 33.1 G/DL — SIGNIFICANT CHANGE UP (ref 32–36)
MCV RBC AUTO: 92.8 FL — SIGNIFICANT CHANGE UP (ref 80–100)
MONOCYTES # BLD AUTO: 0.4 K/UL — SIGNIFICANT CHANGE UP (ref 0–0.9)
MONOCYTES NFR BLD AUTO: 5.4 % — SIGNIFICANT CHANGE UP (ref 2–14)
NEUTROPHILS # BLD AUTO: 4.8 K/UL — SIGNIFICANT CHANGE UP (ref 1.8–7.4)
NEUTROPHILS NFR BLD AUTO: 63.4 % — SIGNIFICANT CHANGE UP (ref 43–77)
PLATELET # BLD AUTO: 243 K/UL — SIGNIFICANT CHANGE UP (ref 150–400)
RBC # BLD: 4.62 M/UL — SIGNIFICANT CHANGE UP (ref 3.8–5.2)
RBC # FLD: 13.6 % — SIGNIFICANT CHANGE UP (ref 10.3–14.5)
WBC # BLD: 7.5 K/UL — SIGNIFICANT CHANGE UP (ref 3.8–10.5)
WBC # FLD AUTO: 7.5 K/UL — SIGNIFICANT CHANGE UP (ref 3.8–10.5)

## 2022-09-20 PROCEDURE — 99215 OFFICE O/P EST HI 40 MIN: CPT

## 2022-09-20 RX ORDER — BUDESONIDE AND FORMOTEROL FUMARATE DIHYDRATE 160; 4.5 UG/1; UG/1
AEROSOL RESPIRATORY (INHALATION)
Refills: 0 | Status: DISCONTINUED | COMMUNITY
End: 2022-09-20

## 2022-09-20 RX ORDER — FLUTICASONE FUROATE, UMECLIDINIUM BROMIDE AND VILANTEROL TRIFENATATE 100; 62.5; 25 UG/1; UG/1; UG/1
100-62.5-25 POWDER RESPIRATORY (INHALATION)
Refills: 0 | Status: ACTIVE | COMMUNITY
Start: 2022-09-20

## 2022-09-20 NOTE — ASSESSMENT
[FreeTextEntry1] : 72 year old female, BRCA 1 positive, with history of stage IIA left breast TNBC s/p lumpectomy in 2005, adjuvant AC-Taxotere, and RT.  Developed bilateral breast primaries s/p bilateral mastectomy + CORA-BSO on 6/18/18.\par 1. Left IDC - 0.9 cm, grade 3, ER 0%, MT 20%, HER2 negative.  pT1b pN0 (stage IB)\par 2. Right IDC- 0.2cm, grade 3,  ER 0%, MT 0%, HER2 negative. pT1a pN0 (stage IB)\par Completed 4 cycles of TC on 10/12/18. \par \par 2/25/22 CT CAP: No acute thoracic or abdominopelvic pathology. No evidence of thoracic or abdominopelvic metastatic disease.\par No aggressive osseous lesions.\par Bone scan - no osseous mets.\par \par \par \par Reviewed: \par 09/20/2022 Today's CBC: WBC 7.5 K, HGB 14.2 g, HCT 42.9 %,  K, ANC 4800\par 3/24/22: B12 798 pg/mL,  Folate 14.3 ng/mL\par 3/24/22: Ferritin 27 ng/mL, Iron 64 ug/dL, TIBC 350 ug/dL, Sat 18%, \par She continues to have neuropathy of feet likely related to chemotherapy.   \par \par \par Plan:\par - Frequent falls: MRI Brain ordered \par - Neuropathy: Continue Cymbalta 90 mg/day, does not find it to be helpful. Also has low mood. Will refer to Dr. Tse / cancer rehab \par -Generalized joint pains - seen by rheum 1 year ago, may need repeat work up\par -Low mood- already on cymbalta which does not seem to be helping. May need to wean off and consider SSRI\par -short term memory loss - will need neuro eval, await MRI brain\par -RTO in 2 months

## 2022-09-20 NOTE — HISTORY OF PRESENT ILLNESS
[Disease: _____________________] : Disease: [unfilled] [T: ___] : T[unfilled] [N: ___] : N[unfilled] [AJCC Stage: ____] : AJCC Stage: [unfilled] [de-identified] : Ms. Knowles was diagnosed with left breast cancer at age 67 in March 2018.  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  Her oncologist was Dr. Sanchez at [facility].  \par \par She initially had left blood nipple discharge for 1 year and 2/2017 mammogram + ultrasound showed benign findings. \par Subsequent mammogram + ultrasound on 3/15/18 showed developing density in left retroareolar breast, and US confirming 0.7 x 0.6 x 0.9 cm hypoechoic nodule at 6:00, 1 cmfn with angulated margins.  \par \par 3/20/18 left breast 6:00 core biopsy, 1 cmfn: invasive poorly differentiated ductal carcinoma, Nicole score 8/9, measuring at least 9 cm. ER 0%, OR 20%, HER2 1+ (negative).\par \par 4/5/18 CT abd/pelvis - no lymphadenopathy or metastatic disease within abdomen or pelvis.  RLL 0.7 cm subpleural pulmonary nodule.\par \par 4/16/18 MRI breast - 1.0 cm irregular mass at the 6:00 axis of the left breast, corresponding to newly diagnosed malignancy. There is irregular non mass enhancement \par extending lateral to the index carcinoma and superiorly and anteriorly from the index carcinoma to involve the nipple, concerning for nipple involvement of disease.\par Prominent 9 mm enhancing focus at the 12:00 position of the right breast. If it would alter management, right breast MR guided core biopsy can be performed for further evaluation.\par \par On 5/15/18 she had MRI guided core biopsy of right breast 12:00 --> pathology invasive poorly differentiated ductal carcinoma, [place] 8/9, measuring at least 2mm, DCIS, solid pattern, high grade nuclear atypia.  ER 0%, OR 0%, HER2 negative. \par \par S/p bilateral mastectomy + SNL biopsies and CORA+BSO on 6/18/18. \par Pathology: \par 1. Left mastectomy - 0.9 cm IDC, Nicole 9/9, margins negative, none of 3 lymph nodes involved. ER 0%, OR 20%, HER2 negative.  pT1b pN0\par 2. Right mastectomy - 0.2cm IDC, [place] 9/9, margins negative, none of 3 lymph nodes involved. ER 0%, OR 0%, HER2 negative. pT1a pN0\par 3. CORA+BSO - negative for malignancy\par \par Family history is significant for maternal grandmother being diagnosed with breast cancer, 2 maternal aunts with breast cancer and mother diagnosed with breast cancer and father diagnosed with throat cancer.\par \par 3/23/18 [facility] genetic results - BRCA1 mutation [id number]\par \par Dr. Esquivel - pain management in [place] (following for back injury)\par \par Treatment summary:\par 8/10/18 - 10/12/18  - 4 cycles of Docetaxel 75 mg/m2 and Cytoxan 600 mg/m2  [de-identified] : invasive ductal [de-identified] : ER 0%, PR20%, HER2 negative [de-identified] : Patient returns for follow up visit with .\par Reports low mood, takes Cymbalta 90 mg qd for neuropathy that does not seem to improve neuropathy or mood \par Reports waning neuropathy in legs and feet, numbness and feeling cold in toes, intermittent ankle pain, pain in hands and joints, mostly at night, experiences acute spasms in the leg\par  reports short-term memory loss and mood changes/irritability\par Reports worsening  brain fog for about 1 year \par Reports 10 minor falls starting in the last month, walks into the door, falls out of bed \par Reports worsening arthritis, saw rheumatologist about 1 year ago \par Reports seeing neurologist for neuropathy \par Reports seeing podiatrist due to ingrown toe nails \par Reports f/u with Catrina Leyva NP\par

## 2022-09-20 NOTE — ADDENDUM
[FreeTextEntry1] : Documented by Yanira Jeffers acting as scribe for Dr. Huerta on 09/20/2022.\par \par All Medical record entries made by the Scribe were at my, Dr. Huerta, direction and personally dictated by me on 09/20/2022. I have reviewed the chart and agree that the record accurately reflects my personal performance of the history, physical exam, assessment and plan. I have also personally directed, reviewed, and agreed with the discharge instructions.\par

## 2022-09-20 NOTE — PHYSICAL EXAM
[Ambulatory and capable of all self care but unable to carry out any work activities] : Status 2- Ambulatory and capable of all self care but unable to carry out any work activities. Up and about more than 50% of waking hours [Normal] : affect appropriate [de-identified] : no edema [de-identified] : b/l reconstructed breast, no palpable breast mass or axillary adenopathy

## 2022-09-22 ENCOUNTER — NON-APPOINTMENT (OUTPATIENT)
Age: 72
End: 2022-09-22

## 2022-09-26 ENCOUNTER — NON-APPOINTMENT (OUTPATIENT)
Age: 72
End: 2022-09-26

## 2022-09-27 ENCOUNTER — APPOINTMENT (OUTPATIENT)
Dept: PHYSICAL MEDICINE AND REHAB | Facility: CLINIC | Age: 72
End: 2022-09-27

## 2022-09-29 ENCOUNTER — APPOINTMENT (OUTPATIENT)
Dept: PHYSICAL MEDICINE AND REHAB | Facility: CLINIC | Age: 72
End: 2022-09-29

## 2022-09-29 DIAGNOSIS — Z78.9 OTHER REDUCED MOBILITY: ICD-10-CM

## 2022-09-29 DIAGNOSIS — Z74.09 OTHER REDUCED MOBILITY: ICD-10-CM

## 2022-09-29 PROCEDURE — 99204 OFFICE O/P NEW MOD 45 MIN: CPT

## 2022-09-29 NOTE — PHYSICAL EXAM
[FreeTextEntry1] : Gen: Patient is A&O x 3, NAD\par HEENT: EOMI, hearing grossly normal\par Resp: regular, non - labored\par CV: pulses regular\par Skin: no rashes, erythema\par Lymph: no clubbing, cyanosis, edema, or palpable lymphadenopathy\par Inspection: no instability or misalignment\par ROM: Pain with lumbar flexion/extension \par Palpation: TTP in B/L LE,  no redness or increased warmth \par Sensation: Decreased to light touch in bilateral feet \par Reflexes: 1+ and symmetric throughout\par Strength: 5/5 throughout\par Special tests: -Gotti's sign \par Gait: antalgic, small step length \par \par

## 2022-09-29 NOTE — HISTORY OF PRESENT ILLNESS
[FreeTextEntry1] : Ms. 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 Developed bilateral breast primaries s/p bilateral mastectomy + CORA-BSO on 6/18/18. Completed 4 cycles of TC on 10/12/18. \par \par She reports that she has significant pain especially in her lower extremities.  She has had chronic neuropathy symptoms since her chemotherapy.  This is exacerbated by chronic low back pain from a previous work injury.  She constantly feels radiation pain down her legs.  She also sometimes reports difficulty with her mobility with small steps.  Denies any recent falls.  States she follows up with a spine and pain physician who has previously MRI at her low back.  She has had epidural injections in the past.  Currently she is on chronic opiate management as well as Lyrica and Cymbalta.\par \par \par

## 2022-09-29 NOTE — ASSESSMENT
[FreeTextEntry1] : 72 year old female presenting for evaluation.\par \par #Chemotherapy-induced peripheral neuropathy\par -Likely exacerbated by radicular symptoms from chronic low back pain and possible lumbar spinal stenosis.\par -Hematology oncology notes reviewed, status post chemotherapy with TC\par -Vascular surgery notes reviewed, no vascular intervention at this time\par -Breast surgery notes reviewed, bone scan performed demonstrating no evidence of osseous metastatic disease\par -Patient currently following with chronic pain physician and spine physician.\par -For her neuropathy it appears she is already optimized on medication regiment including Lyrica and Cymbalta.  She also is on oxycodone for chronic low back pain.\par -Extensive discussion had with patient regarding alternative options for neuropathy treatment including medical acupuncture and medical cannabis.  She reports in the past she has had a bad experience with acupuncture and would like to defer.  She will consider medical cannabis but will discuss with her chronic pain physician prior to initiating.\par \par #Impaired mobility and ADLs\par -Discussed initiation of occupational therapy for desensitization techniques to help with her neuropathy as well as function however she would like to defer at this time.\par \par Follow-up as needed.

## 2022-09-30 LAB
ALBUMIN SERPL ELPH-MCNC: 4.5 G/DL
ALP BLD-CCNC: 139 U/L
ALT SERPL-CCNC: 19 U/L
ANION GAP SERPL CALC-SCNC: 12 MMOL/L
AST SERPL-CCNC: 26 U/L
BILIRUB SERPL-MCNC: 0.2 MG/DL
BUN SERPL-MCNC: 7 MG/DL
CALCIUM SERPL-MCNC: 10 MG/DL
CHLORIDE SERPL-SCNC: 103 MMOL/L
CO2 SERPL-SCNC: 30 MMOL/L
CREAT SERPL-MCNC: 1.05 MG/DL
EGFR: 57 ML/MIN/1.73M2
FERRITIN SERPL-MCNC: 27 NG/ML
FOLATE SERPL-MCNC: 7.2 NG/ML
GLUCOSE SERPL-MCNC: 111 MG/DL
IRON SATN MFR SERPL: 23 %
IRON SERPL-MCNC: 79 UG/DL
POTASSIUM SERPL-SCNC: 4.3 MMOL/L
PROT SERPL-MCNC: 7 G/DL
SODIUM SERPL-SCNC: 146 MMOL/L
TIBC SERPL-MCNC: 350 UG/DL
UIBC SERPL-MCNC: 271 UG/DL
VIT B12 SERPL-MCNC: 738 PG/ML

## 2022-10-18 ENCOUNTER — OUTPATIENT (OUTPATIENT)
Dept: OUTPATIENT SERVICES | Facility: HOSPITAL | Age: 72
LOS: 1 days | End: 2022-10-18
Payer: MEDICARE

## 2022-10-18 ENCOUNTER — APPOINTMENT (OUTPATIENT)
Dept: MRI IMAGING | Facility: CLINIC | Age: 72
End: 2022-10-18

## 2022-10-18 DIAGNOSIS — Z90.13 ACQUIRED ABSENCE OF BILATERAL BREASTS AND NIPPLES: Chronic | ICD-10-CM

## 2022-10-18 DIAGNOSIS — J38.1 POLYP OF VOCAL CORD AND LARYNX: Chronic | ICD-10-CM

## 2022-10-18 DIAGNOSIS — Z00.8 ENCOUNTER FOR OTHER GENERAL EXAMINATION: ICD-10-CM

## 2022-10-18 DIAGNOSIS — Z98.890 OTHER SPECIFIED POSTPROCEDURAL STATES: Chronic | ICD-10-CM

## 2022-10-18 DIAGNOSIS — Z90.710 ACQUIRED ABSENCE OF BOTH CERVIX AND UTERUS: Chronic | ICD-10-CM

## 2022-10-18 PROCEDURE — 70553 MRI BRAIN STEM W/O & W/DYE: CPT | Mod: 26

## 2022-10-18 PROCEDURE — A9585: CPT

## 2022-10-18 PROCEDURE — 70553 MRI BRAIN STEM W/O & W/DYE: CPT

## 2022-11-08 ENCOUNTER — OUTPATIENT (OUTPATIENT)
Dept: OUTPATIENT SERVICES | Facility: HOSPITAL | Age: 72
LOS: 1 days | Discharge: HOME | End: 2022-11-08

## 2022-11-08 DIAGNOSIS — Z90.710 ACQUIRED ABSENCE OF BOTH CERVIX AND UTERUS: Chronic | ICD-10-CM

## 2022-11-08 DIAGNOSIS — Z90.13 ACQUIRED ABSENCE OF BILATERAL BREASTS AND NIPPLES: Chronic | ICD-10-CM

## 2022-11-08 DIAGNOSIS — C50.919 MALIGNANT NEOPLASM OF UNSPECIFIED SITE OF UNSPECIFIED FEMALE BREAST: ICD-10-CM

## 2022-11-08 DIAGNOSIS — Z98.890 OTHER SPECIFIED POSTPROCEDURAL STATES: Chronic | ICD-10-CM

## 2022-11-08 DIAGNOSIS — J38.1 POLYP OF VOCAL CORD AND LARYNX: Chronic | ICD-10-CM

## 2022-11-10 NOTE — PHYSICAL EXAM
[Normal Rate and Rhythm] : normal rate and rhythm [2+] : left 2+ [Ankle Swelling (On Exam)] : present [Ankle Swelling Bilaterally] : bilaterally  [Varicose Veins Of Lower Extremities] : bilaterally [No Rash or Lesion] : No rash or lesion [Alert] : alert [Oriented to Person] : oriented to person [Oriented to Place] : oriented to place [Oriented to Time] : oriented to time [Abdomen Masses] : No abdominal masses [Abdomen Tenderness] : ~T ~M No abdominal tenderness [Calm] : calm [de-identified] : NAD [de-identified] : supple [de-identified] : Normal respiratory effort [de-identified] : BLE 5/5 strength \par BLE sensation intact

## 2022-11-10 NOTE — HISTORY OF PRESENT ILLNESS
[FreeTextEntry1] : 71F with hx of breast cancer and post chemotherapy neuropathy presenting for evaluation of bilateral LE edema associated with cramping pain and numbness in her feet. Her symptoms started after the most recent round of chemo which the patient states entailed less cycles in exchange for stronger doses of chemotherapy. The pain is most significant in her arches and worse in the L extremity. The pain is also present at rest, however is exacerbated with walking and improved, but not completely gone, with rest and leg elevation. The swelling is mostly at the ankles and knees. Her symptoms are worse with walking and at night. Sometimes the pain interferes with her sleep. Denies chest pain, worsening SOB (hx of COPD), new wounds or ulcers.

## 2022-11-10 NOTE — REASON FOR VISIT
[Initial Eval - Existing Diagnosis] : an initial evaluation of an existing diagnosis [FreeTextEntry1] : bilateral lower extremity pain and swelling

## 2022-11-10 NOTE — ASSESSMENT
[FreeTextEntry1] : 71F with hx of breast cancer and post chemotherapy neuropathy presenting with chronic bilateral LE edema and neuropathy. Given that distal pulses palpable therefore circulation intact, patient's symptoms are mostly likely secondary to chemotherapy induced neuropathy and not due to vascular causes. \par \par Plan: \par -no vascular intervention needed at this time\par -recommend feet elevation and compression stockings if tolerable\par -follow up as needed  [Foot care/Footwear] : foot care/footwear

## 2022-11-14 DIAGNOSIS — Z87.891 PERSONAL HISTORY OF NICOTINE DEPENDENCE: ICD-10-CM

## 2022-11-15 ENCOUNTER — APPOINTMENT (OUTPATIENT)
Dept: HEMATOLOGY ONCOLOGY | Facility: CLINIC | Age: 72
End: 2022-11-15

## 2022-11-15 ENCOUNTER — RESULT REVIEW (OUTPATIENT)
Age: 72
End: 2022-11-15

## 2022-11-15 VITALS
DIASTOLIC BLOOD PRESSURE: 84 MMHG | WEIGHT: 179.04 LBS | SYSTOLIC BLOOD PRESSURE: 133 MMHG | HEIGHT: 64.17 IN | BODY MASS INDEX: 30.57 KG/M2 | OXYGEN SATURATION: 99 % | HEART RATE: 80 BPM

## 2022-11-15 DIAGNOSIS — M25.50 PAIN IN UNSPECIFIED JOINT: ICD-10-CM

## 2022-11-15 DIAGNOSIS — C50.911 MALIGNANT NEOPLASM OF UNSPECIFIED SITE OF RIGHT FEMALE BREAST: ICD-10-CM

## 2022-11-15 DIAGNOSIS — R29.6 REPEATED FALLS: ICD-10-CM

## 2022-11-15 LAB
BASOPHILS # BLD AUTO: 0.1 K/UL — SIGNIFICANT CHANGE UP (ref 0–0.2)
BASOPHILS NFR BLD AUTO: 1.1 % — SIGNIFICANT CHANGE UP (ref 0–2)
EOSINOPHIL # BLD AUTO: 0.2 K/UL — SIGNIFICANT CHANGE UP (ref 0–0.5)
EOSINOPHIL NFR BLD AUTO: 2.9 % — SIGNIFICANT CHANGE UP (ref 0–6)
HCT VFR BLD CALC: 39.3 % — SIGNIFICANT CHANGE UP (ref 34.5–45)
HGB BLD-MCNC: 12.8 G/DL — SIGNIFICANT CHANGE UP (ref 11.5–15.5)
LYMPHOCYTES # BLD AUTO: 2 K/UL — SIGNIFICANT CHANGE UP (ref 1–3.3)
LYMPHOCYTES # BLD AUTO: 33.4 % — SIGNIFICANT CHANGE UP (ref 13–44)
MCHC RBC-ENTMCNC: 30.3 PG — SIGNIFICANT CHANGE UP (ref 27–34)
MCHC RBC-ENTMCNC: 32.7 G/DL — SIGNIFICANT CHANGE UP (ref 32–36)
MCV RBC AUTO: 92.6 FL — SIGNIFICANT CHANGE UP (ref 80–100)
MONOCYTES # BLD AUTO: 0.4 K/UL — SIGNIFICANT CHANGE UP (ref 0–0.9)
MONOCYTES NFR BLD AUTO: 6.1 % — SIGNIFICANT CHANGE UP (ref 2–14)
NEUTROPHILS # BLD AUTO: 3.4 K/UL — SIGNIFICANT CHANGE UP (ref 1.8–7.4)
NEUTROPHILS NFR BLD AUTO: 56.6 % — SIGNIFICANT CHANGE UP (ref 43–77)
PLATELET # BLD AUTO: 209 K/UL — SIGNIFICANT CHANGE UP (ref 150–400)
RBC # BLD: 4.24 M/UL — SIGNIFICANT CHANGE UP (ref 3.8–5.2)
RBC # FLD: 12.7 % — SIGNIFICANT CHANGE UP (ref 10.3–14.5)
WBC # BLD: 6 K/UL — SIGNIFICANT CHANGE UP (ref 3.8–10.5)
WBC # FLD AUTO: 6 K/UL — SIGNIFICANT CHANGE UP (ref 3.8–10.5)

## 2022-11-15 PROCEDURE — 99214 OFFICE O/P EST MOD 30 MIN: CPT

## 2022-11-15 RX ORDER — ZOLPIDEM TARTRATE 5 MG/1
5 TABLET ORAL
Qty: 30 | Refills: 0 | Status: DISCONTINUED | COMMUNITY
Start: 2022-06-10

## 2022-11-15 RX ORDER — AMOXICILLIN 500 MG/1
500 CAPSULE ORAL
Qty: 20 | Refills: 0 | Status: DISCONTINUED | COMMUNITY
Start: 2022-09-16

## 2022-11-15 RX ORDER — AMOXICILLIN AND CLAVULANATE POTASSIUM 500; 125 MG/1; MG/1
500-125 TABLET, FILM COATED ORAL
Qty: 20 | Refills: 0 | Status: DISCONTINUED | COMMUNITY
Start: 2022-07-08

## 2022-11-15 NOTE — PHYSICAL EXAM
[Ambulatory and capable of all self care but unable to carry out any work activities] : Status 2- Ambulatory and capable of all self care but unable to carry out any work activities. Up and about more than 50% of waking hours [Normal] : affect appropriate [de-identified] : no edema [de-identified] : b/l reconstructed breast, no palpable breast mass or axillary adenopathy

## 2022-11-15 NOTE — ASSESSMENT
[FreeTextEntry1] : 72 year old female, BRCA 1 positive, with history of stage IIA left breast TNBC s/p lumpectomy in 2005, adjuvant AC-Taxotere, and RT.  Developed bilateral breast primaries s/p bilateral mastectomy + CORA-BSO on 6/18/18.\par 1. Left IDC - 0.9 cm, grade 3, ER 0%, IN 20%, HER2 negative.  pT1b pN0 (stage IB)\par 2. Right IDC- 0.2cm, grade 3,  ER 0%, IN 0%, HER2 negative. pT1a pN0 (stage IB)\par Completed 4 cycles of TC on 10/12/18. \par \par 2/25/22 CT CAP: No acute thoracic or abdominopelvic pathology. No evidence of thoracic or abdominopelvic metastatic disease.No aggressive osseous lesions.Bone scan - no osseous mets.\par 3/24/22: B12 798 pg/mL,  Folate 14.3 ng/mL\par 3/24/22: Ferritin 27 ng/mL, Iron 64 ug/dL, TIBC 350 ug/dL, Sat 18%, \par She continues to have neuropathy of feet likely related to chemotherapy.   \par 09/20/2022 WBC 7.5 K, HGB 14.2 g, HCT 42.9 %,  K, ANC 4800\par \par Reviewed: \par 9/20/22: B12 738 pg/mL,  Folate 7.2 ng/mL\par 9/20/22: Ferritin 27 ng/mL, Iron 79 ug/dL, TIBC 350 ug/dL, Sat 23%, \par 10/18/22 MR Head: negative for mets. \par \par Continues on oral iron.\par \par Plan:\par - Post visit labs ordered: Iron Panel/ B12/Folate/Ferritin  \par - Low Ferritin- if Ferritin continues <30, will consider IV iron. She defers GI eval for colonoscopy. \par -Rectal bleeding- advised to get colonoscopy, pt defers \par -- Neuropathy: Continue Cymbalta 90 mg/day\par -RTO in 6 months

## 2022-11-15 NOTE — HISTORY OF PRESENT ILLNESS
[Disease: _____________________] : Disease: [unfilled] [T: ___] : T[unfilled] [N: ___] : N[unfilled] [AJCC Stage: ____] : AJCC Stage: [unfilled] [de-identified] : Ms. Knowles was diagnosed with left breast cancer at age 67 in March 2018.  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  Her oncologist was Dr. Sanchez at [facility].  \par \par She initially had left blood nipple discharge for 1 year and 2/2017 mammogram + ultrasound showed benign findings. \par Subsequent mammogram + ultrasound on 3/15/18 showed developing density in left retroareolar breast, and US confirming 0.7 x 0.6 x 0.9 cm hypoechoic nodule at 6:00, 1 cmfn with angulated margins.  \par \par 3/20/18 left breast 6:00 core biopsy, 1 cmfn: invasive poorly differentiated ductal carcinoma, Nicole score 8/9, measuring at least 9 cm. ER 0%, OH 20%, HER2 1+ (negative).\par \par 4/5/18 CT abd/pelvis - no lymphadenopathy or metastatic disease within abdomen or pelvis.  RLL 0.7 cm subpleural pulmonary nodule.\par \par 4/16/18 MRI breast - 1.0 cm irregular mass at the 6:00 axis of the left breast, corresponding to newly diagnosed malignancy. There is irregular non mass enhancement \par extending lateral to the index carcinoma and superiorly and anteriorly from the index carcinoma to involve the nipple, concerning for nipple involvement of disease.\par Prominent 9 mm enhancing focus at the 12:00 position of the right breast. If it would alter management, right breast MR guided core biopsy can be performed for further evaluation.\par \par On 5/15/18 she had MRI guided core biopsy of right breast 12:00 --> pathology invasive poorly differentiated ductal carcinoma, [place] 8/9, measuring at least 2mm, DCIS, solid pattern, high grade nuclear atypia.  ER 0%, OH 0%, HER2 negative. \par \par S/p bilateral mastectomy + SNL biopsies and CORA+BSO on 6/18/18. \par Pathology: \par 1. Left mastectomy - 0.9 cm IDC, Nicole 9/9, margins negative, none of 3 lymph nodes involved. ER 0%, OH 20%, HER2 negative.  pT1b pN0\par 2. Right mastectomy - 0.2cm IDC, [place] 9/9, margins negative, none of 3 lymph nodes involved. ER 0%, OH 0%, HER2 negative. pT1a pN0\par 3. CORA+BSO - negative for malignancy\par \par Family history is significant for maternal grandmother being diagnosed with breast cancer, 2 maternal aunts with breast cancer and mother diagnosed with breast cancer and father diagnosed with throat cancer.\par \par 3/23/18 [facility] genetic results - BRCA1 mutation [id number]\par \par Dr. Esquivel - pain management in [place] (following for back injury)\par \par Treatment summary:\par 8/10/18 - 10/12/18  - 4 cycles of Docetaxel 75 mg/m2 and Cytoxan 600 mg/m2  [de-identified] : invasive ductal [de-identified] : ER 0%, PR20%, HER2 negative [de-identified] : Patient returns for follow up visit with .\par Reports following up with Dr. Tse and is considering medical marijuana for neuropathy, trouble sleeping \par Reports trouble sleeping, waking up 3-4 x per night \par Reports increased hot flashes occurring at least 1 x/day \par Reports one episode of blood in stool, attributing to hemorrhoids and straining\par Reports leg cramping \par Reports eating a lot of sweets and junk food, reports likely is not taking in enough iron  \par Reports breaking toes on R foot 3 weeks ago \par Continues cymbalta 90 mg qd, oxycodone for neuropathy \par Stopped taking ambien, reports it provided no relief \par Takes iron qd \par \par Has yet to receive colonoscopy

## 2022-11-15 NOTE — ADDENDUM
[FreeTextEntry1] : Documented by Yanira Jeffers acting as scribe for Dr. Huerta on 11/15/2022.\par \par All Medical record entries made by the Scribe were at my, Dr. Huerta, direction and personally dictated by me on 11/15/2022. I have reviewed the chart and agree that the record accurately reflects my personal performance of the history, physical exam, assessment and plan. I have also personally directed, reviewed, and agreed with the discharge instructions. \par

## 2022-11-18 LAB
ALBUMIN SERPL ELPH-MCNC: 4 G/DL
ALP BLD-CCNC: 130 U/L
ALT SERPL-CCNC: 10 U/L
ANION GAP SERPL CALC-SCNC: 9 MMOL/L
AST SERPL-CCNC: 17 U/L
BILIRUB SERPL-MCNC: 0.2 MG/DL
BUN SERPL-MCNC: 14 MG/DL
CALCIUM SERPL-MCNC: 9.6 MG/DL
CHLORIDE SERPL-SCNC: 103 MMOL/L
CO2 SERPL-SCNC: 31 MMOL/L
CREAT SERPL-MCNC: 1.17 MG/DL
EGFR: 50 ML/MIN/1.73M2
FERRITIN SERPL-MCNC: 15 NG/ML
FOLATE SERPL-MCNC: 6.2 NG/ML
GLUCOSE SERPL-MCNC: 110 MG/DL
IRON SATN MFR SERPL: 20 %
IRON SERPL-MCNC: 61 UG/DL
POTASSIUM SERPL-SCNC: 4.7 MMOL/L
PROT SERPL-MCNC: 6.6 G/DL
SODIUM SERPL-SCNC: 143 MMOL/L
TIBC SERPL-MCNC: 312 UG/DL
UIBC SERPL-MCNC: 251 UG/DL
VIT B12 SERPL-MCNC: 543 PG/ML

## 2022-11-22 ENCOUNTER — APPOINTMENT (OUTPATIENT)
Dept: PHYSICAL MEDICINE AND REHAB | Facility: CLINIC | Age: 72
End: 2022-11-22

## 2022-11-22 DIAGNOSIS — M79.2 NEURALGIA AND NEURITIS, UNSPECIFIED: ICD-10-CM

## 2022-11-22 PROCEDURE — 99213 OFFICE O/P EST LOW 20 MIN: CPT

## 2022-11-22 NOTE — ASSESSMENT
[FreeTextEntry1] : 72 year old female presenting for evaluation.\par \par #Chemotherapy-induced peripheral neuropathy\par -Likely exacerbated by radicular symptoms from chronic low back pain and possible lumbar spinal stenosis.\par -Patient currently following with chronic pain physician and spine physician.  She reports she discussed medical cannabis with them and they are in agreement with treatment.\par -Discussed risks and benefits of medical cannabis to improve neuropathy symptoms.  Advised of risk of increased sedation given her current medications and discussed with her and  that they will need to closely monitor. -Medical cannabis certification completed today. Provided cannabis education, overview of state program, and discussed adverse effects in detail. Counseled that vaporized cannabis is not the preferred route of administration due to the fact that both short-term and long-term risks associated with vaporizing oils are not yet fully understood. Recommend starting with 1:1 THC:CBD formulation at low dose of THC.\par -I Stop # [id number]\par -Recommend follow up with podiatry for evaluation as well.  \par \par \par Follow-up in 6 weeks.\par \par I spent a total time of 23 minutes on the date of the encounter evaluating and treating the patient.\par

## 2022-11-22 NOTE — HISTORY OF PRESENT ILLNESS
[FreeTextEntry1] : Ms. 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 Developed bilateral breast primaries s/p bilateral mastectomy + CORA-BSO on 6/18/18. Completed 4 cycles of TC on 10/12/18. \par \par She reports that she continues to have severe and persistent pain throughout her body but also associated with her neuropathy.  She thinks that her neuropathy symptoms are worsening especially in her feet.  She states that it keeps her up at night she has trouble sleeping.  She is currently under the management of a pain management physician.  She is looking for alternative options to help with her neuropathy symptoms.  Denies any new weakness or bowel bladder dysfunction.\par \par

## 2022-12-01 ENCOUNTER — APPOINTMENT (OUTPATIENT)
Age: 72
End: 2022-12-01

## 2022-12-02 DIAGNOSIS — E53.8 DEFICIENCY OF OTHER SPECIFIED B GROUP VITAMINS: ICD-10-CM

## 2022-12-08 ENCOUNTER — APPOINTMENT (OUTPATIENT)
Age: 72
End: 2022-12-08

## 2022-12-09 DIAGNOSIS — E61.1 IRON DEFICIENCY: ICD-10-CM

## 2022-12-15 ENCOUNTER — APPOINTMENT (OUTPATIENT)
Age: 72
End: 2022-12-15

## 2022-12-22 ENCOUNTER — APPOINTMENT (OUTPATIENT)
Age: 72
End: 2022-12-22

## 2022-12-22 RX ORDER — BUDESONIDE AND FORMOTEROL FUMARATE DIHYDRATE 160; 4.5 UG/1; UG/1
2 AEROSOL RESPIRATORY (INHALATION)
Qty: 0 | Refills: 0 | DISCHARGE

## 2022-12-22 RX ORDER — DULOXETINE HYDROCHLORIDE 30 MG/1
1 CAPSULE, DELAYED RELEASE ORAL
Qty: 0 | Refills: 0 | DISCHARGE

## 2022-12-22 RX ORDER — METOPROLOL TARTRATE 50 MG
1 TABLET ORAL
Qty: 0 | Refills: 0 | DISCHARGE

## 2022-12-22 RX ORDER — CIMETIDINE 200 MG
1 TABLET ORAL
Qty: 0 | Refills: 0 | DISCHARGE

## 2022-12-22 RX ORDER — TIOTROPIUM BROMIDE 18 UG/1
1 CAPSULE ORAL; RESPIRATORY (INHALATION)
Qty: 0 | Refills: 0 | DISCHARGE

## 2023-01-05 ENCOUNTER — APPOINTMENT (OUTPATIENT)
Age: 73
End: 2023-01-05

## 2023-01-09 ENCOUNTER — OUTPATIENT (OUTPATIENT)
Dept: OUTPATIENT SERVICES | Facility: HOSPITAL | Age: 73
LOS: 1 days | Discharge: HOME | End: 2023-01-09

## 2023-01-09 DIAGNOSIS — Z98.890 OTHER SPECIFIED POSTPROCEDURAL STATES: Chronic | ICD-10-CM

## 2023-01-09 DIAGNOSIS — Z90.13 ACQUIRED ABSENCE OF BILATERAL BREASTS AND NIPPLES: Chronic | ICD-10-CM

## 2023-01-09 DIAGNOSIS — C50.919 MALIGNANT NEOPLASM OF UNSPECIFIED SITE OF UNSPECIFIED FEMALE BREAST: ICD-10-CM

## 2023-01-09 DIAGNOSIS — Z90.710 ACQUIRED ABSENCE OF BOTH CERVIX AND UTERUS: Chronic | ICD-10-CM

## 2023-01-09 DIAGNOSIS — J38.1 POLYP OF VOCAL CORD AND LARYNX: Chronic | ICD-10-CM

## 2023-01-12 ENCOUNTER — APPOINTMENT (OUTPATIENT)
Age: 73
End: 2023-01-12

## 2023-01-13 DIAGNOSIS — E61.1 IRON DEFICIENCY: ICD-10-CM

## 2023-02-07 NOTE — H&P PST ADULT - GENITOURINARY
MTM referral from: [facility] visit (referral by provider)    MTM referral outreach attempt #2 on February 7, 2023 at 2:38 PM      Outcome: Patient not reachable after several attempts, will route to MTM Pharmacist/Provider as an FYI.  MTM scheduling number is [phone].  Thank you for the referral.    Corbin Simons, MTM coordinator    Pt is dsm   negative

## 2023-02-15 ENCOUNTER — RESULT REVIEW (OUTPATIENT)
Age: 73
End: 2023-02-15

## 2023-02-15 ENCOUNTER — APPOINTMENT (OUTPATIENT)
Dept: HEMATOLOGY ONCOLOGY | Facility: CLINIC | Age: 73
End: 2023-02-15
Payer: MEDICARE

## 2023-02-15 VITALS
WEIGHT: 178 LBS | HEART RATE: 63 BPM | BODY MASS INDEX: 30.39 KG/M2 | DIASTOLIC BLOOD PRESSURE: 89 MMHG | SYSTOLIC BLOOD PRESSURE: 162 MMHG | TEMPERATURE: 97.6 F | OXYGEN SATURATION: 95 % | HEIGHT: 64.17 IN

## 2023-02-15 LAB
BASOPHILS # BLD AUTO: 0.1 K/UL — SIGNIFICANT CHANGE UP (ref 0–0.2)
BASOPHILS NFR BLD AUTO: 1.2 % — SIGNIFICANT CHANGE UP (ref 0–2)
EOSINOPHIL # BLD AUTO: 0.2 K/UL — SIGNIFICANT CHANGE UP (ref 0–0.5)
EOSINOPHIL NFR BLD AUTO: 3.5 % — SIGNIFICANT CHANGE UP (ref 0–6)
HCT VFR BLD CALC: 39.6 % — SIGNIFICANT CHANGE UP (ref 34.5–45)
HGB BLD-MCNC: 13.5 G/DL — SIGNIFICANT CHANGE UP (ref 11.5–15.5)
LYMPHOCYTES # BLD AUTO: 1.6 K/UL — SIGNIFICANT CHANGE UP (ref 1–3.3)
LYMPHOCYTES # BLD AUTO: 28.2 % — SIGNIFICANT CHANGE UP (ref 13–44)
MCHC RBC-ENTMCNC: 31.4 PG — SIGNIFICANT CHANGE UP (ref 27–34)
MCHC RBC-ENTMCNC: 34.2 G/DL — SIGNIFICANT CHANGE UP (ref 32–36)
MCV RBC AUTO: 91.6 FL — SIGNIFICANT CHANGE UP (ref 80–100)
MONOCYTES # BLD AUTO: 0.3 K/UL — SIGNIFICANT CHANGE UP (ref 0–0.9)
MONOCYTES NFR BLD AUTO: 4.9 % — SIGNIFICANT CHANGE UP (ref 2–14)
NEUTROPHILS # BLD AUTO: 3.6 K/UL — SIGNIFICANT CHANGE UP (ref 1.8–7.4)
NEUTROPHILS NFR BLD AUTO: 62.2 % — SIGNIFICANT CHANGE UP (ref 43–77)
PLATELET # BLD AUTO: 189 K/UL — SIGNIFICANT CHANGE UP (ref 150–400)
RBC # BLD: 4.32 M/UL — SIGNIFICANT CHANGE UP (ref 3.8–5.2)
RBC # FLD: 13.1 % — SIGNIFICANT CHANGE UP (ref 10.3–14.5)
WBC # BLD: 5.8 K/UL — SIGNIFICANT CHANGE UP (ref 3.8–10.5)
WBC # FLD AUTO: 5.8 K/UL — SIGNIFICANT CHANGE UP (ref 3.8–10.5)

## 2023-02-15 PROCEDURE — 99214 OFFICE O/P EST MOD 30 MIN: CPT

## 2023-02-15 NOTE — HISTORY OF PRESENT ILLNESS
No [Disease: _____________________] : Disease: [unfilled] [T: ___] : T[unfilled] [N: ___] : N[unfilled] [AJCC Stage: ____] : AJCC Stage: [unfilled] [de-identified] : Ms. Knowles was diagnosed with left breast cancer at age 67 in March 2018.  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  Her oncologist was Dr. Sanchez at [facility].  \par \par She initially had left blood nipple discharge for 1 year and 2/2017 mammogram + ultrasound showed benign findings. \par Subsequent mammogram + ultrasound on 3/15/18 showed developing density in left retroareolar breast, and US confirming 0.7 x 0.6 x 0.9 cm hypoechoic nodule at 6:00, 1 cmfn with angulated margins.  \par \par 3/20/18 left breast 6:00 core biopsy, 1 cmfn: invasive poorly differentiated ductal carcinoma, Nicole score 8/9, measuring at least 9 cm. ER 0%, NM 20%, HER2 1+ (negative).\par \par 4/5/18 CT abd/pelvis - no lymphadenopathy or metastatic disease within abdomen or pelvis.  RLL 0.7 cm subpleural pulmonary nodule.\par \par 4/16/18 MRI breast - 1.0 cm irregular mass at the 6:00 axis of the left breast, corresponding to newly diagnosed malignancy. There is irregular non mass enhancement \par extending lateral to the index carcinoma and superiorly and anteriorly from the index carcinoma to involve the nipple, concerning for nipple involvement of disease.\par Prominent 9 mm enhancing focus at the 12:00 position of the right breast. If it would alter management, right breast MR guided core biopsy can be performed for further evaluation.\par \par On 5/15/18 she had MRI guided core biopsy of right breast 12:00 --> pathology invasive poorly differentiated ductal carcinoma, [place] 8/9, measuring at least 2mm, DCIS, solid pattern, high grade nuclear atypia.  ER 0%, NM 0%, HER2 negative. \par \par S/p bilateral mastectomy + SNL biopsies and CORA+BSO on 6/18/18. \par Pathology: \par 1. Left mastectomy - 0.9 cm IDC, Nicole 9/9, margins negative, none of 3 lymph nodes involved. ER 0%, NM 20%, HER2 negative.  pT1b pN0\par 2. Right mastectomy - 0.2cm IDC, [place] 9/9, margins negative, none of 3 lymph nodes involved. ER 0%, NM 0%, HER2 negative. pT1a pN0\par 3. CORA+BSO - negative for malignancy\par \par Family history is significant for maternal grandmother being diagnosed with breast cancer, 2 maternal aunts with breast cancer and mother diagnosed with breast cancer and father diagnosed with throat cancer.\par \par 3/23/18 [facility] genetic results - BRCA1 mutation [id number]\par \par Dr. Esquivel - pain management in [place] (following for back injury)\par \par Treatment summary:\par 8/10/18 - 10/12/18  - 4 cycles of Docetaxel 75 mg/m2 and Cytoxan 600 mg/m2  [de-identified] : invasive ductal [de-identified] : ER 0%, PR20%, HER2 negative [de-identified] : Patient returns for follow up visit with .\par \par S/p Venofer X 5 doses from 12/1/22 - 1/12/23, tolerated well\par Not taking oral iron \par Pending routine follow up with cardiologist Dr Toth [facility]\par Pending apt with Dr Fischer next month at [facility] in [place] \par Denies ha, imbalance\par Notes chronic back pain which has not progressed. Has left sided lower back pain radiating down left leg for the past 6 months, on Cymbalta, has not obtained imaging, following up with Dr Tse\par Denies dyspnea, cough\par Denies abdominal pain, nausea, vomiting, weight loss, blood in stool, black stool, hematuria \par Has not followed up with GI due to hemorrhoids causing intermittent bleeding when straining to have a bowel movement at times \par Feels well, ambulating

## 2023-02-15 NOTE — ASSESSMENT
[FreeTextEntry1] : 72 year old female, BRCA 1 positive, with history of stage IIA left breast TNBC s/p lumpectomy in 2005, adjuvant AC-Taxotere, and RT.  Developed bilateral breast primaries s/p bilateral mastectomy + CORA-BSO on 6/18/18.\par 1. Left IDC - 0.9 cm, grade 3, ER 0%, HI 20%, HER2 negative.  pT1b pN0 (stage IB)\par 2. Right IDC- 0.2cm, grade 3,  ER 0%, HI 0%, HER2 negative. pT1a pN0 (stage IB)\par Completed 4 cycles of TC on 10/12/18. \par 2/25/22 CT CAP: No acute thoracic or abdominopelvic pathology. No evidence of thoracic or abdominopelvic metastatic disease.No aggressive osseous lesions.Bone scan - no osseous mets.\par 10/18/22 MR Head: negative for mets. \par \par S/p Venofer X 5 doses from 12/1/22 - 1/12/23, tolerated well\par Reviewed CBC from today WBC 5.8 K HGB 13.5 g HCT 39.6 %  K \par \par Plan:\par -Hemorrhoid bleeding- advised urgent referral sent to GI to obtain GI work up series urgently. Advised if rectal bleeding occurs to go seek immediate medical attention. Iron Panel/ B12/Folate/Ferritin . If Ferritin continues <30, will consider IV iron. \par -Lower back pain/radiating down left leg- NM Bone imaging total ordered STAT to r/o metastatic disease. Advised to follow up with Dr Carmencita manjarrez for further evaluation/management/tx \par -Neuropathy: Continue Cymbalta 90 mg/day\par -Follow up with Dr Fischer \par -RTO in 6 months with Dr Huerta or sooner if needed  20-Mar-2020 WDL

## 2023-02-15 NOTE — PHYSICAL EXAM
[Ambulatory and capable of all self care but unable to carry out any work activities] : Status 2- Ambulatory and capable of all self care but unable to carry out any work activities. Up and about more than 50% of waking hours [Normal] : no JVD, no calf tenderness, venous stasis changes, varices [de-identified] : supple [de-identified] : no edema [de-identified] : b/l reconstructed breast, no palpable breast mass or axillary adenopathy bilaterally

## 2023-02-17 ENCOUNTER — APPOINTMENT (OUTPATIENT)
Dept: GASTROENTEROLOGY | Facility: CLINIC | Age: 73
End: 2023-02-17
Payer: MEDICARE

## 2023-02-17 VITALS
RESPIRATION RATE: 16 BRPM | HEART RATE: 60 BPM | SYSTOLIC BLOOD PRESSURE: 180 MMHG | HEIGHT: 65 IN | DIASTOLIC BLOOD PRESSURE: 110 MMHG | OXYGEN SATURATION: 95 % | WEIGHT: 177 LBS | BODY MASS INDEX: 29.49 KG/M2 | TEMPERATURE: 97.3 F

## 2023-02-17 DIAGNOSIS — K59.00 CONSTIPATION, UNSPECIFIED: ICD-10-CM

## 2023-02-17 DIAGNOSIS — K64.9 UNSPECIFIED HEMORRHOIDS: ICD-10-CM

## 2023-02-17 DIAGNOSIS — K62.5 HEMORRHAGE OF ANUS AND RECTUM: ICD-10-CM

## 2023-02-17 PROCEDURE — 99204 OFFICE O/P NEW MOD 45 MIN: CPT

## 2023-02-17 RX ORDER — NAPROXEN SODIUM 220 MG
TABLET ORAL
Refills: 0 | Status: ACTIVE | COMMUNITY

## 2023-02-17 RX ORDER — POLYETHYLENE GLYCOL-3350 AND ELECTROLYTES WITH FLAVOR PACK 240; 5.84; 2.98; 6.72; 22.72 G/278.26G; G/278.26G; G/278.26G; G/278.26G; G/278.26G
240 POWDER, FOR SOLUTION ORAL
Qty: 1 | Refills: 0 | Status: ACTIVE | COMMUNITY
Start: 2023-02-17 | End: 1900-01-01

## 2023-02-17 RX ORDER — LACTULOSE 10 G/15ML
20 SOLUTION ORAL TWICE DAILY
Qty: 180 | Refills: 0 | Status: ACTIVE | COMMUNITY
Start: 2023-02-17 | End: 1900-01-01

## 2023-02-17 RX ORDER — HYDROCORTISONE 2.5% 25 MG/G
2.5 CREAM TOPICAL
Qty: 1 | Refills: 3 | Status: ACTIVE | COMMUNITY
Start: 2023-02-17 | End: 1900-01-01

## 2023-02-17 NOTE — ASSESSMENT
[FreeTextEntry1] : 72 year old female presenting for evaluation of chronic constipation and rectal bleeding. Multiple over the counter constipation remedies and medications tried and failed. Will start Lactulose 20 GM/ 30 ML BID. Patient declined digital rectal exam due to rectal discomfort but allowed for a visual rectal exam. Some perianal erythema noted, no external hemorrhoids visualized. \par \par Will scheduled a colonoscopy at [facility] for further evaluation. I have discussed the indications (including but not limited to ruling out inflammatory bowel disease, colorectal neoplasm, GI bleed, and AVM's), benefits, risks  (including but not limited to reaction to the anesthesia, infection, bleeding, missed lesions, and perforation),  and alternatives to colonoscopy with the patient. The patient understands all options and has agreed to have a colonoscopy and is medically optimized for the planned procedure. \par \par GaviLyte prep\par Pulmonary clearance\par \par High fiber diet\par Lactulose 20 GM/ 30 ML BID, will call office in 2 weeks if dose is ineffective and will discuss other options if necessary\par \par Proctozone 2.5% HC rectal cream BID for hemorrhoids\par Sitz baths PRN\par Continue Tucks wipes PRN

## 2023-02-17 NOTE — HISTORY OF PRESENT ILLNESS
[FreeTextEntry1] : ANTELMO KHAN is a very pleasant 72 year old female with PMH of breast cancer (BRCA 1 & 2 positive, s/p double mastectomy and RIO flap), HTN, COPD (2.5 L O2 PRN), and CORA BSO, presenting today for evaluation of chronic constipation and rectal bleeding. She reports a life long history of constipation. She moves her bowels daily but strains heavily doing so. She has used multiple modalities in the past with very little effect. She has used MiraLAX, Metamucil, prune juice, Colace, laxatives, and Lactulose. Because of her constant straining she has hemorrhoids that cause a lot of discomfort and occasionally bleed. She has had two recent episodes of significant rectal bleeding where the whole toilet bowl was full of blood. She uses Preparation H and Tucks wipes for relief. Last colonoscopy was over 10 years ago and she believes that it was normal. No family history of colon cancer or colon polyps. Her mother had breast cancer that metastasized to the colon. She denies abdominal pain, weight loss, loss of appetite, or any upper GI symptoms.

## 2023-02-17 NOTE — REASON FOR VISIT
[Initial Evaluation] : an initial evaluation [Spouse] : spouse [FreeTextEntry1] : rectal bleeding, constipation

## 2023-02-17 NOTE — PHYSICAL EXAM
[Alert] : alert [Normal Voice/Communication] : normal voice/communication [Healthy Appearing] : healthy appearing [No Acute Distress] : no acute distress [Sclera] : the sclera and conjunctiva were normal [Hearing Threshold Finger Rub Not [place]] : hearing was normal [Normal Lips/Gums] : the lips and gums were normal [Oropharynx] : the oropharynx was normal [Normal Appearance] : the appearance of the neck was normal [No Neck Mass] : no neck mass was observed [No Respiratory Distress] : no respiratory distress [No Acc Muscle Use] : no accessory muscle use [Respiration, Rhythm And Depth] : normal respiratory rhythm and effort [Auscultation Breath Sounds / Voice Sounds] : lungs were clear to auscultation bilaterally [Heart Rate And Rhythm] : heart rate was normal and rhythm regular [Normal S1, S2] : normal S1 and S2 [Murmurs] : no murmurs [Bowel Sounds] : normal bowel sounds [Abdomen Tenderness] : non-tender [No Masses] : no abdominal mass palpated [Abdomen Soft] : soft [] : no hepatosplenomegaly [External Hemorrhoid] : an external hemorrhoid was present [Oriented To Time, Place, And Person] : oriented to person, place, and time [de-identified] : only visual rectal exam performed due to discomfort

## 2023-02-22 ENCOUNTER — APPOINTMENT (OUTPATIENT)
Dept: NUCLEAR MEDICINE | Facility: CLINIC | Age: 73
End: 2023-02-22
Payer: MEDICARE

## 2023-02-22 ENCOUNTER — OUTPATIENT (OUTPATIENT)
Dept: OUTPATIENT SERVICES | Facility: HOSPITAL | Age: 73
LOS: 1 days | End: 2023-02-22

## 2023-02-22 ENCOUNTER — RESULT REVIEW (OUTPATIENT)
Age: 73
End: 2023-02-22

## 2023-02-22 DIAGNOSIS — Z98.890 OTHER SPECIFIED POSTPROCEDURAL STATES: Chronic | ICD-10-CM

## 2023-02-22 DIAGNOSIS — Z90.710 ACQUIRED ABSENCE OF BOTH CERVIX AND UTERUS: Chronic | ICD-10-CM

## 2023-02-22 DIAGNOSIS — M54.9 DORSALGIA, UNSPECIFIED: ICD-10-CM

## 2023-02-22 DIAGNOSIS — Z90.13 ACQUIRED ABSENCE OF BILATERAL BREASTS AND NIPPLES: Chronic | ICD-10-CM

## 2023-02-22 DIAGNOSIS — J38.1 POLYP OF VOCAL CORD AND LARYNX: Chronic | ICD-10-CM

## 2023-02-22 PROCEDURE — 78306 BONE IMAGING WHOLE BODY: CPT | Mod: 26

## 2023-02-22 PROCEDURE — 78830 RP LOCLZJ TUM SPECT W/CT 1: CPT | Mod: 26

## 2023-02-23 LAB — FERRITIN SERPL-MCNC: 183 NG/ML

## 2023-02-28 LAB
ALBUMIN SERPL ELPH-MCNC: 4.1 G/DL
ALP BLD-CCNC: 119 U/L
ALT SERPL-CCNC: 16 U/L
ANION GAP SERPL CALC-SCNC: 13 MMOL/L
AST SERPL-CCNC: 18 U/L
BILIRUB SERPL-MCNC: 0.3 MG/DL
BUN SERPL-MCNC: 12 MG/DL
CALCIUM SERPL-MCNC: 9 MG/DL
CHLORIDE SERPL-SCNC: 101 MMOL/L
CO2 SERPL-SCNC: 27 MMOL/L
CREAT SERPL-MCNC: 1.1 MG/DL
EGFR: 54 ML/MIN/1.73M2
FOLATE SERPL-MCNC: 6 NG/ML
GLUCOSE SERPL-MCNC: 102 MG/DL
IRON SATN MFR SERPL: 38 %
IRON SERPL-MCNC: 103 UG/DL
POTASSIUM SERPL-SCNC: 4.3 MMOL/L
PROT SERPL-MCNC: 6.3 G/DL
SODIUM SERPL-SCNC: 141 MMOL/L
TIBC SERPL-MCNC: 273 UG/DL
UIBC SERPL-MCNC: 170 UG/DL
VIT B12 SERPL-MCNC: 507 PG/ML

## 2023-04-10 ENCOUNTER — APPOINTMENT (OUTPATIENT)
Dept: GASTROENTEROLOGY | Facility: HOSPITAL | Age: 73
End: 2023-04-10

## 2023-05-07 ENCOUNTER — OUTPATIENT (OUTPATIENT)
Dept: OUTPATIENT SERVICES | Facility: HOSPITAL | Age: 73
LOS: 1 days | Discharge: HOME | End: 2023-05-07

## 2023-05-07 DIAGNOSIS — Z90.710 ACQUIRED ABSENCE OF BOTH CERVIX AND UTERUS: Chronic | ICD-10-CM

## 2023-05-07 DIAGNOSIS — C50.919 MALIGNANT NEOPLASM OF UNSPECIFIED SITE OF UNSPECIFIED FEMALE BREAST: ICD-10-CM

## 2023-05-07 DIAGNOSIS — J38.1 POLYP OF VOCAL CORD AND LARYNX: Chronic | ICD-10-CM

## 2023-05-07 DIAGNOSIS — Z98.890 OTHER SPECIFIED POSTPROCEDURAL STATES: Chronic | ICD-10-CM

## 2023-05-07 DIAGNOSIS — Z90.13 ACQUIRED ABSENCE OF BILATERAL BREASTS AND NIPPLES: Chronic | ICD-10-CM

## 2023-05-15 ENCOUNTER — RESULT REVIEW (OUTPATIENT)
Age: 73
End: 2023-05-15

## 2023-05-15 ENCOUNTER — APPOINTMENT (OUTPATIENT)
Dept: HEMATOLOGY ONCOLOGY | Facility: CLINIC | Age: 73
End: 2023-05-15
Payer: MEDICARE

## 2023-05-15 VITALS
WEIGHT: 162 LBS | SYSTOLIC BLOOD PRESSURE: 182 MMHG | DIASTOLIC BLOOD PRESSURE: 99 MMHG | OXYGEN SATURATION: 99 % | HEIGHT: 64.17 IN | HEART RATE: 72 BPM | BODY MASS INDEX: 27.66 KG/M2

## 2023-05-15 DIAGNOSIS — R63.4 ABNORMAL WEIGHT LOSS: ICD-10-CM

## 2023-05-15 LAB
BASOPHILS # BLD AUTO: 0.1 K/UL — SIGNIFICANT CHANGE UP (ref 0–0.2)
BASOPHILS NFR BLD AUTO: 1.3 % — SIGNIFICANT CHANGE UP (ref 0–2)
EOSINOPHIL # BLD AUTO: 0.1 K/UL — SIGNIFICANT CHANGE UP (ref 0–0.5)
EOSINOPHIL NFR BLD AUTO: 2.3 % — SIGNIFICANT CHANGE UP (ref 0–6)
HCT VFR BLD CALC: 43.6 % — SIGNIFICANT CHANGE UP (ref 34.5–45)
HGB BLD-MCNC: 15 G/DL — SIGNIFICANT CHANGE UP (ref 11.5–15.5)
LYMPHOCYTES # BLD AUTO: 2.1 K/UL — SIGNIFICANT CHANGE UP (ref 1–3.3)
LYMPHOCYTES # BLD AUTO: 33.7 % — SIGNIFICANT CHANGE UP (ref 13–44)
MCHC RBC-ENTMCNC: 31.3 PG — SIGNIFICANT CHANGE UP (ref 27–34)
MCHC RBC-ENTMCNC: 34.5 G/DL — SIGNIFICANT CHANGE UP (ref 32–36)
MCV RBC AUTO: 90.9 FL — SIGNIFICANT CHANGE UP (ref 80–100)
MONOCYTES # BLD AUTO: 0.4 K/UL — SIGNIFICANT CHANGE UP (ref 0–0.9)
MONOCYTES NFR BLD AUTO: 6 % — SIGNIFICANT CHANGE UP (ref 2–14)
NEUTROPHILS # BLD AUTO: 3.5 K/UL — SIGNIFICANT CHANGE UP (ref 1.8–7.4)
NEUTROPHILS NFR BLD AUTO: 56.7 % — SIGNIFICANT CHANGE UP (ref 43–77)
PLATELET # BLD AUTO: 215 K/UL — SIGNIFICANT CHANGE UP (ref 150–400)
RBC # BLD: 4.8 M/UL — SIGNIFICANT CHANGE UP (ref 3.8–5.2)
RBC # FLD: 11.8 % — SIGNIFICANT CHANGE UP (ref 10.3–14.5)
WBC # BLD: 6.1 K/UL — SIGNIFICANT CHANGE UP (ref 3.8–10.5)
WBC # FLD AUTO: 6.1 K/UL — SIGNIFICANT CHANGE UP (ref 3.8–10.5)

## 2023-05-15 PROCEDURE — 99214 OFFICE O/P EST MOD 30 MIN: CPT

## 2023-05-15 RX ORDER — TIOTROPIUM BROMIDE INHALATION SPRAY 1.56 UG/1
SPRAY, METERED RESPIRATORY (INHALATION)
Refills: 0 | Status: DISCONTINUED | COMMUNITY
End: 2023-05-15

## 2023-05-15 NOTE — ASSESSMENT
[FreeTextEntry1] : 72 year old female, BRCA 1 positive, with history of stage IIA left breast TNBC s/p lumpectomy in 2005, adjuvant AC-Taxotere, and RT.  Developed bilateral breast primaries s/p bilateral mastectomy + CORA-BSO on 6/18/18.\par 1. Left IDC - 0.9 cm, grade 3, ER 0%, HI 20%, HER2 negative.  pT1b pN0 (stage IB)\par 2. Right IDC- 0.2cm, grade 3,  ER 0%, HI 0%, HER2 negative. pT1a pN0 (stage IB)\par Completed 4 cycles of TC on 10/12/18. \par 2/25/22 CT CAP: No acute thoracic or abdominopelvic pathology. No evidence of thoracic or abdominopelvic metastatic disease.No aggressive osseous lesions.Bone scan - no osseous mets.\par 10/18/22 MR Head: negative for mets. \par S/p Venofer X 5 doses from 12/1/22 - 1/12/23, tolerated well\par \par Plan:\par Iron deficiency - seen by GI, pending colonoscopy, iron studies pending\par \par Weight loss / h/o TNBC - CT c/a/p ordered\par \par -Memory issues, increased falls - Continue follow up with Neurology, last MR Head in 10/2023 - no evidence of metastatic disease. \par \par -RTO in 4 months \par \par \par \par \par

## 2023-05-15 NOTE — HISTORY OF PRESENT ILLNESS
[Disease: _____________________] : Disease: [unfilled] [T: ___] : T[unfilled] [N: ___] : N[unfilled] [AJCC Stage: ____] : AJCC Stage: [unfilled] [de-identified] : Ms. Knowles was diagnosed with left breast cancer at age 67 in March 2018.  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  Her oncologist was Dr. Sanchez at [facility].  \par \par She initially had left blood nipple discharge for 1 year and 2/2017 mammogram + ultrasound showed benign findings. \par Subsequent mammogram + ultrasound on 3/15/18 showed developing density in left retroareolar breast, and US confirming 0.7 x 0.6 x 0.9 cm hypoechoic nodule at 6:00, 1 cmfn with angulated margins.  \par \par 3/20/18 left breast 6:00 core biopsy, 1 cmfn: invasive poorly differentiated ductal carcinoma, Nicole score 8/9, measuring at least 9 cm. ER 0%, MS 20%, HER2 1+ (negative).\par \par 4/5/18 CT abd/pelvis - no lymphadenopathy or metastatic disease within abdomen or pelvis.  RLL 0.7 cm subpleural pulmonary nodule.\par \par 4/16/18 MRI breast - 1.0 cm irregular mass at the 6:00 axis of the left breast, corresponding to newly diagnosed malignancy. There is irregular non mass enhancement \par extending lateral to the index carcinoma and superiorly and anteriorly from the index carcinoma to involve the nipple, concerning for nipple involvement of disease.\par Prominent 9 mm enhancing focus at the 12:00 position of the right breast. If it would alter management, right breast MR guided core biopsy can be performed for further evaluation.\par \par On 5/15/18 she had MRI guided core biopsy of right breast 12:00 --> pathology invasive poorly differentiated ductal carcinoma, [place] 8/9, measuring at least 2mm, DCIS, solid pattern, high grade nuclear atypia.  ER 0%, MS 0%, HER2 negative. \par \par S/p bilateral mastectomy + SNL biopsies and CORA+BSO on 6/18/18. \par Pathology: \par 1. Left mastectomy - 0.9 cm IDC, Nicole 9/9, margins negative, none of 3 lymph nodes involved. ER 0%, MS 20%, HER2 negative.  pT1b pN0\par 2. Right mastectomy - 0.2cm IDC, [place] 9/9, margins negative, none of 3 lymph nodes involved. ER 0%, MS 0%, HER2 negative. pT1a pN0\par 3. CORA+BSO - negative for malignancy\par \par Family history is significant for maternal grandmother being diagnosed with breast cancer, 2 maternal aunts with breast cancer and mother diagnosed with breast cancer and father diagnosed with throat cancer.\par \par 3/23/18 [facility] genetic results - BRCA1 mutation [id number]\par \par Dr. Esquivel - pain management in [place] (following for back injury)\par \par Treatment summary:\par 8/10/18 - 10/12/18  - 4 cycles of Docetaxel 75 mg/m2 and Cytoxan 600 mg/m2  [de-identified] : invasive ductal [de-identified] : ER 0%, PR20%, HER2 negative [de-identified] : 05/15/2023 Returns for follow up visit with spouse. Presents with c/o difficulty with hand movement, constipation, with no relief with prune use, trouble urinary, constant sore throat x 2 weeks and voice change, progressing memory issues- followed by neurology, mouth sores. \par Reports quit smoking 25 years ago, h/o lung ?nodules, follows with Pulmonologist. \par Reports progressive hearing loss, uses b/l hearing aids. \par Reports multiple imaging pending, requests full body imaging. \par Reports sleep disturbances and trouble falling asleep, takes xanax with relief. \par Reports following with podiatrist for worsening neuropathy in b/l feet that is causing tripping. \par Reports pain and reduced ROM in left shoulder.

## 2023-05-15 NOTE — ADDENDUM
[FreeTextEntry1] : Documented by Yanira Jeffers acting as scribe for Dr. Huerta on 05/15/2023.\par \par All Medical record entries made by the Scribe were at my, Dr. Huerta, direction and personally dictated by me on 05/15/2023. I have reviewed the chart and agree that the record accurately reflects my personal performance of the history, physical exam, assessment and plan. I have also personally directed, reviewed, and agreed with the discharge instructions.

## 2023-05-15 NOTE — PHYSICAL EXAM
[Ambulatory and capable of all self care but unable to carry out any work activities] : Status 2- Ambulatory and capable of all self care but unable to carry out any work activities. Up and about more than 50% of waking hours [Normal] : affect appropriate [de-identified] : supple [de-identified] : no edema [de-identified] : b/l reconstructed breast, no palpable breast mass or axillary adenopathy bilaterally

## 2023-05-16 ENCOUNTER — RESULT REVIEW (OUTPATIENT)
Age: 73
End: 2023-05-16

## 2023-05-18 ENCOUNTER — APPOINTMENT (OUTPATIENT)
Dept: CT IMAGING | Facility: CLINIC | Age: 73
End: 2023-05-18
Payer: MEDICARE

## 2023-05-18 ENCOUNTER — OUTPATIENT (OUTPATIENT)
Dept: OUTPATIENT SERVICES | Facility: HOSPITAL | Age: 73
LOS: 1 days | End: 2023-05-18
Payer: MEDICARE

## 2023-05-18 DIAGNOSIS — Z90.13 ACQUIRED ABSENCE OF BILATERAL BREASTS AND NIPPLES: Chronic | ICD-10-CM

## 2023-05-18 DIAGNOSIS — C50.912 MALIGNANT NEOPLASM OF UNSPECIFIED SITE OF LEFT FEMALE BREAST: ICD-10-CM

## 2023-05-18 DIAGNOSIS — Z90.710 ACQUIRED ABSENCE OF BOTH CERVIX AND UTERUS: Chronic | ICD-10-CM

## 2023-05-18 DIAGNOSIS — Z98.890 OTHER SPECIFIED POSTPROCEDURAL STATES: Chronic | ICD-10-CM

## 2023-05-18 DIAGNOSIS — J38.1 POLYP OF VOCAL CORD AND LARYNX: Chronic | ICD-10-CM

## 2023-05-18 DIAGNOSIS — Z00.8 ENCOUNTER FOR OTHER GENERAL EXAMINATION: ICD-10-CM

## 2023-05-18 PROCEDURE — 74177 CT ABD & PELVIS W/CONTRAST: CPT | Mod: 26

## 2023-05-18 PROCEDURE — 71260 CT THORAX DX C+: CPT | Mod: 26

## 2023-05-18 PROCEDURE — 71260 CT THORAX DX C+: CPT

## 2023-05-18 PROCEDURE — 74177 CT ABD & PELVIS W/CONTRAST: CPT

## 2023-05-24 LAB
ALBUMIN SERPL ELPH-MCNC: 4.7 G/DL
ALP BLD-CCNC: 106 U/L
ALT SERPL-CCNC: 17 U/L
ANION GAP SERPL CALC-SCNC: 15 MMOL/L
AST SERPL-CCNC: 19 U/L
BILIRUB SERPL-MCNC: 0.3 MG/DL
BUN SERPL-MCNC: 14 MG/DL
CALCIUM SERPL-MCNC: 10.3 MG/DL
CHLORIDE SERPL-SCNC: 97 MMOL/L
CO2 SERPL-SCNC: 26 MMOL/L
CREAT SERPL-MCNC: 1.1 MG/DL
EGFR: 54 ML/MIN/1.73M2
FERRITIN SERPL-MCNC: 214 NG/ML
FOLATE SERPL-MCNC: 18.7 NG/ML
GLUCOSE SERPL-MCNC: 89 MG/DL
IRON SATN MFR SERPL: 33 %
IRON SERPL-MCNC: 103 UG/DL
POTASSIUM SERPL-SCNC: 4.2 MMOL/L
PROT SERPL-MCNC: 7.2 G/DL
SODIUM SERPL-SCNC: 137 MMOL/L
TIBC SERPL-MCNC: 311 UG/DL
UIBC SERPL-MCNC: 207 UG/DL
VIT B12 SERPL-MCNC: 588 PG/ML

## 2023-08-08 ENCOUNTER — OUTPATIENT (OUTPATIENT)
Dept: OUTPATIENT SERVICES | Facility: HOSPITAL | Age: 73
LOS: 1 days | Discharge: HOME | End: 2023-08-08

## 2023-08-08 DIAGNOSIS — Z98.890 OTHER SPECIFIED POSTPROCEDURAL STATES: Chronic | ICD-10-CM

## 2023-08-08 DIAGNOSIS — C50.919 MALIGNANT NEOPLASM OF UNSPECIFIED SITE OF UNSPECIFIED FEMALE BREAST: ICD-10-CM

## 2023-08-08 DIAGNOSIS — Z90.13 ACQUIRED ABSENCE OF BILATERAL BREASTS AND NIPPLES: Chronic | ICD-10-CM

## 2023-08-08 DIAGNOSIS — Z90.710 ACQUIRED ABSENCE OF BOTH CERVIX AND UTERUS: Chronic | ICD-10-CM

## 2023-08-08 DIAGNOSIS — J38.1 POLYP OF VOCAL CORD AND LARYNX: Chronic | ICD-10-CM

## 2023-08-08 DIAGNOSIS — E61.1 IRON DEFICIENCY: ICD-10-CM

## 2023-08-08 DIAGNOSIS — E53.8 DEFICIENCY OF OTHER SPECIFIED B GROUP VITAMINS: ICD-10-CM

## 2023-08-16 ENCOUNTER — APPOINTMENT (OUTPATIENT)
Dept: HEMATOLOGY ONCOLOGY | Facility: CLINIC | Age: 73
End: 2023-08-16

## 2023-09-11 ENCOUNTER — RESULT REVIEW (OUTPATIENT)
Age: 73
End: 2023-09-11

## 2023-09-11 ENCOUNTER — APPOINTMENT (OUTPATIENT)
Dept: HEMATOLOGY ONCOLOGY | Facility: CLINIC | Age: 73
End: 2023-09-11
Payer: MEDICARE

## 2023-09-11 VITALS
OXYGEN SATURATION: 94 % | SYSTOLIC BLOOD PRESSURE: 155 MMHG | BODY MASS INDEX: 26.72 KG/M2 | HEIGHT: 64 IN | TEMPERATURE: 97.8 F | HEART RATE: 69 BPM | WEIGHT: 156.53 LBS | DIASTOLIC BLOOD PRESSURE: 80 MMHG

## 2023-09-11 DIAGNOSIS — C50.912 MALIGNANT NEOPLASM OF UNSPECIFIED SITE OF LEFT FEMALE BREAST: ICD-10-CM

## 2023-09-11 LAB
BASOPHILS # BLD AUTO: 0.1 K/UL — SIGNIFICANT CHANGE UP (ref 0–0.2)
BASOPHILS NFR BLD AUTO: 1.2 % — SIGNIFICANT CHANGE UP (ref 0–2)
EOSINOPHIL # BLD AUTO: 0.1 K/UL — SIGNIFICANT CHANGE UP (ref 0–0.5)
EOSINOPHIL NFR BLD AUTO: 1.9 % — SIGNIFICANT CHANGE UP (ref 0–6)
HCT VFR BLD CALC: 42.2 % — SIGNIFICANT CHANGE UP (ref 34.5–45)
HGB BLD-MCNC: 15.2 G/DL — SIGNIFICANT CHANGE UP (ref 11.5–15.5)
LYMPHOCYTES # BLD AUTO: 2.4 K/UL — SIGNIFICANT CHANGE UP (ref 1–3.3)
LYMPHOCYTES # BLD AUTO: 35.6 % — SIGNIFICANT CHANGE UP (ref 13–44)
MCHC RBC-ENTMCNC: 31.2 PG — SIGNIFICANT CHANGE UP (ref 27–34)
MCHC RBC-ENTMCNC: 35.9 G/DL — SIGNIFICANT CHANGE UP (ref 32–36)
MCV RBC AUTO: 86.9 FL — SIGNIFICANT CHANGE UP (ref 80–100)
MONOCYTES # BLD AUTO: 0.5 K/UL — SIGNIFICANT CHANGE UP (ref 0–0.9)
MONOCYTES NFR BLD AUTO: 6.8 % — SIGNIFICANT CHANGE UP (ref 2–14)
NEUTROPHILS # BLD AUTO: 3.7 K/UL — SIGNIFICANT CHANGE UP (ref 1.8–7.4)
NEUTROPHILS NFR BLD AUTO: 54.6 % — SIGNIFICANT CHANGE UP (ref 43–77)
PLATELET # BLD AUTO: 242 K/UL — SIGNIFICANT CHANGE UP (ref 150–400)
RBC # BLD: 4.85 M/UL — SIGNIFICANT CHANGE UP (ref 3.8–5.2)
RBC # FLD: 11.4 % — SIGNIFICANT CHANGE UP (ref 10.3–14.5)
WBC # BLD: 6.7 K/UL — SIGNIFICANT CHANGE UP (ref 3.8–10.5)
WBC # FLD AUTO: 6.7 K/UL — SIGNIFICANT CHANGE UP (ref 3.8–10.5)

## 2023-09-11 PROCEDURE — 99214 OFFICE O/P EST MOD 30 MIN: CPT

## 2023-09-12 PROBLEM — C50.912 INVASIVE DUCTAL CARCINOMA OF BREAST, LEFT: Status: ACTIVE | Noted: 2018-03-26

## 2023-09-14 LAB
ALBUMIN SERPL ELPH-MCNC: 4.6 G/DL
ALP BLD-CCNC: 101 U/L
ALT SERPL-CCNC: 12 U/L
ANION GAP SERPL CALC-SCNC: 14 MMOL/L
AST SERPL-CCNC: 19 U/L
BILIRUB SERPL-MCNC: 0.4 MG/DL
BUN SERPL-MCNC: 15 MG/DL
CALCIUM SERPL-MCNC: 9.9 MG/DL
CHLORIDE SERPL-SCNC: 104 MMOL/L
CO2 SERPL-SCNC: 27 MMOL/L
CREAT SERPL-MCNC: 1.15 MG/DL
EGFR: 51 ML/MIN/1.73M2
FERRITIN SERPL-MCNC: 223 NG/ML
FOLATE SERPL-MCNC: >20 NG/ML
GLUCOSE SERPL-MCNC: 94 MG/DL
IRON SATN MFR SERPL: 38 %
IRON SERPL-MCNC: 111 UG/DL
POTASSIUM SERPL-SCNC: 4.3 MMOL/L
PROT SERPL-MCNC: 7.1 G/DL
SODIUM SERPL-SCNC: 145 MMOL/L
TIBC SERPL-MCNC: 294 UG/DL
UIBC SERPL-MCNC: 183 UG/DL
VIT B12 SERPL-MCNC: 1008 PG/ML

## 2023-10-13 ENCOUNTER — APPOINTMENT (OUTPATIENT)
Dept: PHYSICAL MEDICINE AND REHAB | Facility: CLINIC | Age: 73
End: 2023-10-13
Payer: MEDICARE

## 2023-10-13 VITALS
HEART RATE: 72 BPM | RESPIRATION RATE: 12 BRPM | BODY MASS INDEX: 26.12 KG/M2 | SYSTOLIC BLOOD PRESSURE: 158 MMHG | DIASTOLIC BLOOD PRESSURE: 73 MMHG | WEIGHT: 153 LBS | HEIGHT: 64 IN

## 2023-10-13 DIAGNOSIS — G62.0 DRUG-INDUCED POLYNEUROPATHY: ICD-10-CM

## 2023-10-13 DIAGNOSIS — T45.1X5A DRUG-INDUCED POLYNEUROPATHY: ICD-10-CM

## 2023-10-13 PROCEDURE — 99213 OFFICE O/P EST LOW 20 MIN: CPT

## 2024-01-08 ENCOUNTER — OUTPATIENT (OUTPATIENT)
Dept: OUTPATIENT SERVICES | Facility: HOSPITAL | Age: 74
LOS: 1 days | Discharge: HOME | End: 2024-01-08

## 2024-01-08 DIAGNOSIS — E61.1 IRON DEFICIENCY: ICD-10-CM

## 2024-01-08 DIAGNOSIS — C50.919 MALIGNANT NEOPLASM OF UNSPECIFIED SITE OF UNSPECIFIED FEMALE BREAST: ICD-10-CM

## 2024-01-08 DIAGNOSIS — J38.1 POLYP OF VOCAL CORD AND LARYNX: Chronic | ICD-10-CM

## 2024-01-08 DIAGNOSIS — Z90.13 ACQUIRED ABSENCE OF BILATERAL BREASTS AND NIPPLES: Chronic | ICD-10-CM

## 2024-01-08 DIAGNOSIS — Z98.890 OTHER SPECIFIED POSTPROCEDURAL STATES: Chronic | ICD-10-CM

## 2024-01-08 DIAGNOSIS — Z90.710 ACQUIRED ABSENCE OF BOTH CERVIX AND UTERUS: Chronic | ICD-10-CM

## 2024-01-08 DIAGNOSIS — E53.8 DEFICIENCY OF OTHER SPECIFIED B GROUP VITAMINS: ICD-10-CM

## 2024-01-09 ENCOUNTER — APPOINTMENT (OUTPATIENT)
Dept: HEMATOLOGY ONCOLOGY | Facility: CLINIC | Age: 74
End: 2024-01-09
Payer: MEDICARE

## 2024-01-09 ENCOUNTER — RESULT REVIEW (OUTPATIENT)
Age: 74
End: 2024-01-09

## 2024-01-09 VITALS
HEART RATE: 64 BPM | DIASTOLIC BLOOD PRESSURE: 79 MMHG | SYSTOLIC BLOOD PRESSURE: 158 MMHG | WEIGHT: 157 LBS | TEMPERATURE: 97.2 F | HEIGHT: 64 IN | BODY MASS INDEX: 26.8 KG/M2 | OXYGEN SATURATION: 95 %

## 2024-01-09 DIAGNOSIS — E61.1 IRON DEFICIENCY: ICD-10-CM

## 2024-01-09 DIAGNOSIS — Z85.3 PERSONAL HISTORY OF MALIGNANT NEOPLASM OF BREAST: ICD-10-CM

## 2024-01-09 LAB
BASOPHILS # BLD AUTO: 0.1 K/UL — SIGNIFICANT CHANGE UP (ref 0–0.2)
BASOPHILS # BLD AUTO: 0.1 K/UL — SIGNIFICANT CHANGE UP (ref 0–0.2)
BASOPHILS NFR BLD AUTO: 1.5 % — SIGNIFICANT CHANGE UP (ref 0–2)
BASOPHILS NFR BLD AUTO: 1.5 % — SIGNIFICANT CHANGE UP (ref 0–2)
EOSINOPHIL # BLD AUTO: 0.2 K/UL — SIGNIFICANT CHANGE UP (ref 0–0.5)
EOSINOPHIL # BLD AUTO: 0.2 K/UL — SIGNIFICANT CHANGE UP (ref 0–0.5)
EOSINOPHIL NFR BLD AUTO: 2.1 % — SIGNIFICANT CHANGE UP (ref 0–6)
EOSINOPHIL NFR BLD AUTO: 2.1 % — SIGNIFICANT CHANGE UP (ref 0–6)
HCT VFR BLD CALC: 43.2 % — SIGNIFICANT CHANGE UP (ref 34.5–45)
HCT VFR BLD CALC: 43.2 % — SIGNIFICANT CHANGE UP (ref 34.5–45)
HGB BLD-MCNC: 14.6 G/DL — SIGNIFICANT CHANGE UP (ref 11.5–15.5)
HGB BLD-MCNC: 14.6 G/DL — SIGNIFICANT CHANGE UP (ref 11.5–15.5)
LYMPHOCYTES # BLD AUTO: 2.7 K/UL — SIGNIFICANT CHANGE UP (ref 1–3.3)
LYMPHOCYTES # BLD AUTO: 2.7 K/UL — SIGNIFICANT CHANGE UP (ref 1–3.3)
LYMPHOCYTES # BLD AUTO: 35.2 % — SIGNIFICANT CHANGE UP (ref 13–44)
LYMPHOCYTES # BLD AUTO: 35.2 % — SIGNIFICANT CHANGE UP (ref 13–44)
MCHC RBC-ENTMCNC: 30.9 PG — SIGNIFICANT CHANGE UP (ref 27–34)
MCHC RBC-ENTMCNC: 30.9 PG — SIGNIFICANT CHANGE UP (ref 27–34)
MCHC RBC-ENTMCNC: 33.8 G/DL — SIGNIFICANT CHANGE UP (ref 32–36)
MCHC RBC-ENTMCNC: 33.8 G/DL — SIGNIFICANT CHANGE UP (ref 32–36)
MCV RBC AUTO: 91.3 FL — SIGNIFICANT CHANGE UP (ref 80–100)
MCV RBC AUTO: 91.3 FL — SIGNIFICANT CHANGE UP (ref 80–100)
MONOCYTES # BLD AUTO: 0.3 K/UL — SIGNIFICANT CHANGE UP (ref 0–0.9)
MONOCYTES # BLD AUTO: 0.3 K/UL — SIGNIFICANT CHANGE UP (ref 0–0.9)
MONOCYTES NFR BLD AUTO: 4.2 % — SIGNIFICANT CHANGE UP (ref 2–14)
MONOCYTES NFR BLD AUTO: 4.2 % — SIGNIFICANT CHANGE UP (ref 2–14)
NEUTROPHILS # BLD AUTO: 4.3 K/UL — SIGNIFICANT CHANGE UP (ref 1.8–7.4)
NEUTROPHILS # BLD AUTO: 4.3 K/UL — SIGNIFICANT CHANGE UP (ref 1.8–7.4)
NEUTROPHILS NFR BLD AUTO: 57 % — SIGNIFICANT CHANGE UP (ref 43–77)
NEUTROPHILS NFR BLD AUTO: 57 % — SIGNIFICANT CHANGE UP (ref 43–77)
PLATELET # BLD AUTO: 288 K/UL — SIGNIFICANT CHANGE UP (ref 150–400)
PLATELET # BLD AUTO: 288 K/UL — SIGNIFICANT CHANGE UP (ref 150–400)
RBC # BLD: 4.72 M/UL — SIGNIFICANT CHANGE UP (ref 3.8–5.2)
RBC # BLD: 4.72 M/UL — SIGNIFICANT CHANGE UP (ref 3.8–5.2)
RBC # FLD: 11.8 % — SIGNIFICANT CHANGE UP (ref 10.3–14.5)
RBC # FLD: 11.8 % — SIGNIFICANT CHANGE UP (ref 10.3–14.5)
WBC # BLD: 7.6 K/UL — SIGNIFICANT CHANGE UP (ref 3.8–10.5)
WBC # BLD: 7.6 K/UL — SIGNIFICANT CHANGE UP (ref 3.8–10.5)
WBC # FLD AUTO: 7.6 K/UL — SIGNIFICANT CHANGE UP (ref 3.8–10.5)
WBC # FLD AUTO: 7.6 K/UL — SIGNIFICANT CHANGE UP (ref 3.8–10.5)

## 2024-01-09 PROCEDURE — 99214 OFFICE O/P EST MOD 30 MIN: CPT

## 2024-01-12 LAB
FERRITIN SERPL-MCNC: 245 NG/ML
FOLATE SERPL-MCNC: >20 NG/ML
IRON SATN MFR SERPL: 28 %
IRON SERPL-MCNC: 84 UG/DL
TIBC SERPL-MCNC: 297 UG/DL
UIBC SERPL-MCNC: 213 UG/DL
VIT B12 SERPL-MCNC: 1415 PG/ML

## 2024-04-22 ENCOUNTER — RX ONLY (RX ONLY)
Age: 74
End: 2024-04-22

## 2024-04-22 ENCOUNTER — OFFICE (OUTPATIENT)
Dept: URBAN - METROPOLITAN AREA CLINIC 100 | Facility: CLINIC | Age: 74
Setting detail: OPHTHALMOLOGY
End: 2024-04-22
Payer: COMMERCIAL

## 2024-04-22 DIAGNOSIS — H16.223: ICD-10-CM

## 2024-04-22 DIAGNOSIS — H01.001: ICD-10-CM

## 2024-04-22 DIAGNOSIS — H25.13: ICD-10-CM

## 2024-04-22 DIAGNOSIS — H01.004: ICD-10-CM

## 2024-04-22 PROBLEM — H00.11 CHALAZION; RIGHT UPPER LID: Status: ACTIVE | Noted: 2024-04-22

## 2024-04-22 PROCEDURE — 99203 OFFICE O/P NEW LOW 30 MIN: CPT | Performed by: OPHTHALMOLOGY

## 2024-04-22 ASSESSMENT — LID EXAM ASSESSMENTS
OD_BLEPHARITIS: RUL 1+ 2+
OS_BLEPHARITIS: LUL 1+ 2+

## 2024-05-22 ENCOUNTER — OFFICE (OUTPATIENT)
Dept: URBAN - METROPOLITAN AREA CLINIC 100 | Facility: CLINIC | Age: 74
Setting detail: OPHTHALMOLOGY
End: 2024-05-22
Payer: COMMERCIAL

## 2024-05-22 DIAGNOSIS — H43.813: ICD-10-CM

## 2024-05-22 DIAGNOSIS — H52.13: ICD-10-CM

## 2024-05-22 PROBLEM — H01.001 BLEPHARITIS; RIGHT UPPER LID, LEFT UPPER LID: Status: ACTIVE | Noted: 2024-05-22

## 2024-05-22 PROBLEM — H01.004 BLEPHARITIS; RIGHT UPPER LID, LEFT UPPER LID: Status: ACTIVE | Noted: 2024-05-22

## 2024-05-22 PROCEDURE — 92014 COMPRE OPH EXAM EST PT 1/>: CPT | Performed by: OPHTHALMOLOGY

## 2024-05-22 PROCEDURE — 92015 DETERMINE REFRACTIVE STATE: CPT | Performed by: OPHTHALMOLOGY

## 2024-05-22 ASSESSMENT — CONFRONTATIONAL VISUAL FIELD TEST (CVF)
OS_FINDINGS: FULL
OD_FINDINGS: FULL

## 2024-05-22 ASSESSMENT — LID EXAM ASSESSMENTS
OS_BLEPHARITIS: LUL 1+ 2+
OD_BLEPHARITIS: RUL 1+ 2+

## 2024-06-05 ENCOUNTER — NON-APPOINTMENT (OUTPATIENT)
Age: 74
End: 2024-06-05

## 2024-06-05 DIAGNOSIS — L03.031 CELLULITIS OF RIGHT TOE: ICD-10-CM

## 2024-06-05 DIAGNOSIS — G57.91 UNSPECIFIED MONONEUROPATHY OF RIGHT LOWER LIMB: ICD-10-CM

## 2024-06-05 DIAGNOSIS — G57.92 UNSPECIFIED MONONEUROPATHY OF LEFT LOWER LIMB: ICD-10-CM

## 2024-06-05 DIAGNOSIS — L60.0 INGROWING NAIL: ICD-10-CM

## 2024-06-05 DIAGNOSIS — G62.9 POLYNEUROPATHY, UNSPECIFIED: ICD-10-CM

## 2024-06-05 DIAGNOSIS — S93.401A SPRAIN OF UNSPECIFIED LIGAMENT OF RIGHT ANKLE, INITIAL ENCOUNTER: ICD-10-CM

## 2024-07-22 NOTE — ED PROVIDER NOTE - SKIN, MLM
Time-based billing (NON-critical care) Skin normal color for race, warm, dry and intact. No evidence of rash.

## 2024-07-26 NOTE — PROGRESS NOTE ADULT - ASSESSMENT
Anesthesia Pre Eval Note    10 yo F w/ PMH of rhabdomyosarcoma, on chemo w/ port, presents to MRI for MRI pelvis w/wo contrast under GA    Allergies reviewed. NPO appropriate. No recent URI sx. No recent issues with chemo. No prior hx of issues with GA or MAC    ACCESS: R chest port (Accessed)    ECHO (11/2023):  SUMMARY:  Persistent tachycardia at approximately 150 bpm throughout exam.  Normal valve structure and function.    Qualitatively normal right ventricular chamber size, wall thickness, and systolic function.  -RV free wall strain -27.3%, four-chamber strain -23.5% (normal)  Low normal left ventricular size, wall thickness, and systolic function.  -Ejection fraction by Dixon's biplane method of discs 55%  -Average endocardial global longitudinal strain -19.4%  No pericardial or obvious pleural effusion.     As compared to the prior echocardiogram on 5/16/2023, the following changes are present:  The patient is persistently tachycardic throughout this exam.       Anesthesia ROS/Med Hx        Anesthetic Complication History:    Patient does not have a history of anesthetic complications      Pulmonary Review:    Negative for asthma  Negative for recent URI     Neuro/Psych Review:  Patient does not have a neuro/psych history         Cardiovascular Review:   Exercise tolerance: good (>4 METS)  Positive for hypertension - well controlled    GI/HEPATIC/RENAL Review:   Comments: Hematuria, abdominal pain  Positive for GERD  Positive for renal disease (history of ureteral stents) - acute renal failure    End/Other Review:    Positive for anemia  Positive for thrombocytopenia  Positive for chronic pain  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) and has port and on chemo  Positive for bleeding disorder (history of pancytopenia)  Additional Results:      ALLERGIES:   -- Red Blood Cells -- HIVES   -- Amoxicillin -- RASH   Last Labs        Component                Value                Date/Time                  WBC                      13.2                07/12/2024 0006            RBC                      3.07 (L)            07/12/2024 0006            HGB                      8.4 (L)             07/12/2024 0006            HCT                      24.7 (L)            07/12/2024 0006            MCV                      80.5                07/12/2024 0006            MCH                      27.4                07/12/2024 0006            MCHC                     34.0                07/12/2024 0006            RDW-CV                   16.8 (H)            07/12/2024 0006            Sodium                   135                 07/12/2024 0006            Potassium                3.2 (L)             07/12/2024 0006            Chloride                 105                 07/12/2024 0006            Carbon Dioxide           22                  07/12/2024 0006            Glucose                  107 (H)             07/12/2024 0006            BUN                      14                  07/12/2024 0006            Creatinine               0.36                07/12/2024 0006            Glomerular Filtrati*                         07/12/2024 0006         Comment:    GFR not calculated for age less than 18 years       Calcium                  9.4                 07/12/2024 0006            PLT                      249                 07/12/2024 0006            PTT                      30                  05/18/2024 2338            INR                      1.1                 05/18/2024 2338        Past Medical History:  No date: Anemia  No date: Blood transfusion reaction  05/22/2023: Cancer related pain  No date: Chronic pain      Comment:  middle abdomen  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  FOXO1 gene negative;                EWSR1 rearrangement negative  No date: Encounter for nasogastric (NG) tube placement  No date: Gastroesophageal reflux  66 yo s/p CORA BSO PW. Mastectomy with RIO flaps by Dr. Fischer. disease  05/18/2023: Gross hematuria  No date: History of recent blood transfusion      Comment:  per mom child had last blood transfusion 4/29 and needs                to be premedicated prior to transfusion otherwise has                hives  06/20/2023: Malnutrition of moderate degree  (CMD)  06/20/2023: Moderate dehydration  05/18/2023: Status post placement of ureteral stent  Past Surgical History:  05/17/2023: Bone marrow biopsy  05/17/2023: Cystoscopy w/ ureteral stent placement      Comment:  Changeover 10/20/2023  05/07/2024: Cystoscopy w/ ureteral stent removal; Bilateral  No date: Ir ureteral stent external removal and replacement bilateral      Comment:  x 2  05/17/2023: Open needle biopsy of pelvic mass  05/17/2023: Picc line insertion; Right      Comment:  right arm  07/12/2023: Subcutaneous port insertion; Right      Comment:  6 Fr [employer] Power Port   Prior to Admission medications :  Medication gabapentin (NEURONTIN) 250 MG/5ML solution, Sig Take 9 mLs by mouth in the morning and 9 mLs at noon and 9 mLs in the evening. Indications: Neuropathic Pain., Start Date 6/20/24, End Date , Taking? Yes, Authorizing Provider Lesia Santana CNP    Medication oxyBUTYnin (DITROPAN) 5 MG/5ML solution, Sig Take 5 mLs by mouth in the morning and 5 mLs at noon and 5 mLs in the evening., Start Date 5/16/24, End Date , Taking? Yes, Authorizing Provider Kimberly Meng MD    Medication sulfamethoxazole-trimethoprim (BACTRIM) 200-40 MG/5ML suspension, Sig Take 9.5 mLs by mouth 2 times daily on Saturday and Sunday., Start Date 3/30/24, End Date , Taking? Yes, Authorizing Provider Carol Lindsey MD    Medication enalapril maleate (EPANED) 1 MG/ML oral solution, Sig Take 1.5 mLs by mouth daily.  Patient not taking: Reported on 7/26/2024, Start Date 7/23/24, End Date , Taking? , Authorizing Provider Ellyn Rogers MD    Medication HYDROcodone-acetaminophen 7.5-325 MG/15ML solution, Sig Take 6 mLs by mouth every 6  hours as needed for Pain (For disease related acute breakthrough pain).  Patient not taking: Reported on 7/26/2024, Start Date 4/29/24, End Date , Taking? , Authorizing Provider Milli Hidalgo, CNS    Medication lactulose (CHRONULAC) 10 GM/15ML solution, Sig Take 15 mLs by mouth daily as needed (Constipation).  Patient not taking: Reported on 7/2/2024, Start Date 3/29/24, End Date , Taking? , Authorizing Provider Jodi Roche CNP    Medication lidocaine-prilocaine (EMLA) cream, Sig Apply to port site 30-60 minutes prior to needle access  Patient not taking: Reported on 7/26/2024, Start Date 7/10/23, End Date , Taking? , Authorizing Provider Jodi Roche CNP    Medication ondansetron (ZOFRAN ODT) 4 MG disintegrating tablet, Sig Place 1 tablet onto the tongue every 8 hours as needed for Nausea.  Patient not taking: Reported on 7/2/2024, Start Date 5/30/23, End Date , Taking? , Authorizing Provider Jodi Roche CNP         Patient Vitals for the past 24 hrs:   BP Temp Temp src Pulse Resp SpO2   07/26/24 0934 (!) 120/70 37 °C (98.6 °F) Oral 88 20 100 %       Social history reviewed:  Social History     Tobacco Use   Smoking Status Never    Passive exposure: Never (smokes outside)   Smokeless Tobacco Never        E-Cigarette/Vaping Substances & Devices       Social History     Substance and Sexual Activity   Alcohol Use None           Relevant Problems   Endo   (+) Proximal renal tubular dysfunction      /Renal   (+) MELINDA (acute kidney injury) (CMD)   (+) Acute kidney injury (CMD)      Hematology   (+) Embryonal rhabdomyosarcoma  (CMD)   (+) Normocytic anemia   (+) Pancytopenia due to antineoplastic chemotherapy (CMD)   (+) Rhabdomyosarcoma  (CMD)       Physical Exam     Airway   Mallampati: II  TM Distance: >3 FB  Neck ROM: Full  Neck: Able to place in sniff position  TMJ Mobility: Good    Cardiovascular    Cardio Rhythm: Regular    Head Assessment  Head assessment: Normocephalic and Atraumatic    General  Assessment  General Assessment: Alert and oriented and No acute distress    Dental Exam  Dental exam normal    Legend: C=Chipped  M=Missing  L=Loose B=Bridge Cr=Weir I=Implant    Pulmonary Exam  Pulmonary exam normal    Abdominal Exam  Abdominal exam normal      Anesthesia Plan:    ASA Status: 2  Anesthesia Type: General    Induction: Intravenous  Preferred Airway Type: Nasal Cannula  Patient does not have a difficult airway or is not at risk of aspiration.   Appropriate airway precautions are in place.  Maintenance: TIVA    Post-op Pain Management: Per Surgeon      Checklist  Reviewed: NPO Status, Allergies, Medications, Problem list, Past Med History, Lab Results, Outside Records and Anesthesia Record  Consent/Risks Discussed Statement:  The proposed anesthetic plan, including its risks and benefits, have been discussed with the Mother along with the risks and benefits of alternatives. Questions were encouraged and answered and the patient and/or representative understands and agrees to proceed.    I have discussed elements of the patient's history or examination, as noted above and/or as follows, that place the patient at higher risk of complications; age, hematological disease and kidney disease.    I discussed with the patient (and/or patient's legal representative) the risks and benefits of the proposed anesthesia plan, General, which may include services performed by other anesthesia providers.    Alternative anesthesia plans, if available, were reviewed with the patient (and/or patient's legal representative). Discussion has been held with the patient (and/or patient's legal representative) regarding risks of anesthesia, which include Nausea, Vomiting, Dental Injury, allergic reaction, anxiety, aspiration, depressed breathing, emergence delirium, eye injury, headache, intra-operative awareness, sore throat and oral injury and emergent situations that may require change in anesthesia plan.    The patient  (and/or patient's legal representative) has indicated understanding, his/her questions have been answered, and he/she wishes to proceed with the planned anesthetic.    Blood Products: Not Anticipated

## 2024-09-20 ENCOUNTER — APPOINTMENT (OUTPATIENT)
Dept: PHYSICAL MEDICINE AND REHAB | Facility: CLINIC | Age: 74
End: 2024-09-20
Payer: MEDICARE

## 2024-09-20 DIAGNOSIS — M79.2 NEURALGIA AND NEURITIS, UNSPECIFIED: ICD-10-CM

## 2024-09-20 PROCEDURE — 99213 OFFICE O/P EST LOW 20 MIN: CPT

## 2024-09-20 NOTE — HISTORY OF PRESENT ILLNESS
[FreeTextEntry1] : Ms. 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 Developed bilateral breast primaries s/p bilateral mastectomy + CORA-BSO on 6/18/18. Completed 4 cycles of TC on 10/12/18.   Since her last visit she reports she still has neuropathy symptoms.  Still worse in her feet but some in her hands.  She has been using medical cannabis and denies any side effects to this thinks it has been helping for severe pain.  Only using occasionally.  She continues to follow-up with her pain management physician and is on pregabalin and duloxetine.  Continues to follow-up with oncology.  No new focal weakness.

## 2024-09-20 NOTE — PHYSICAL EXAM
[FreeTextEntry1] : Gen: Patient is A&O x 3, NAD HEENT: EOMI, hearing grossly normal Resp: regular, non - labored CV: pulses regular Skin: no rashes, erythema Lymph: no clubbing, cyanosis, edema, or palpable lymphadenopathy Inspection: no instability or misalignment Sensation: Decreased to light touch in bilateral feet, +Dysesthesia  Reflexes: 1+ and symmetric throughout Strength: 5/5 throughout Special tests: -Gotti's sign  Gait: antalgic, small step length

## 2024-09-20 NOTE — ASSESSMENT
[FreeTextEntry1] : 73 year old female presenting for evaluation.  #Chemotherapy-induced peripheral neuropathy/Neuropathic pain -Continue to follow with chronic pain physician and spine physician.  -Continue pregabalin and duloxetine -Reports medical cannabis is improving symptoms, no side effects  -Medical cannabis certification completed today. She reports her pain physician is aware and approves of medical cannabis use. -Continue to follow with oncology -I Stop # [phone]  Follow-up in 3 months.

## 2024-10-21 ENCOUNTER — RX ONLY (RX ONLY)
Age: 74
End: 2024-10-21

## 2024-10-21 ENCOUNTER — OFFICE (OUTPATIENT)
Dept: URBAN - METROPOLITAN AREA CLINIC 100 | Facility: CLINIC | Age: 74
Setting detail: OPHTHALMOLOGY
End: 2024-10-21
Payer: COMMERCIAL

## 2024-10-21 DIAGNOSIS — H01.001: ICD-10-CM

## 2024-10-21 DIAGNOSIS — H01.004: ICD-10-CM

## 2024-10-21 DIAGNOSIS — H43.813: ICD-10-CM

## 2024-10-21 DIAGNOSIS — H00.11: ICD-10-CM

## 2024-10-21 DIAGNOSIS — H25.13: ICD-10-CM

## 2024-10-21 DIAGNOSIS — H16.223: ICD-10-CM

## 2024-10-21 PROCEDURE — 92012 INTRM OPH EXAM EST PATIENT: CPT | Performed by: OPHTHALMOLOGY

## 2024-10-21 ASSESSMENT — KERATOMETRY
OS_K2POWER_DIOPTERS: 44.75
OD_K1POWER_DIOPTERS: 45.00
OD_AXISANGLE_DEGREES: 082
OS_K1POWER_DIOPTERS: 44.25
OS_AXISANGLE_DEGREES: 128
OD_K2POWER_DIOPTERS: 45.50

## 2024-10-21 ASSESSMENT — REFRACTION_MANIFEST
OD_CYLINDER: -1.00
OD_SPHERE: -1.25
OS_SPHERE: -1.00
OU_VA: 20/20-2
OD_VA1: 20/20-2
OS_CYLINDER: SPHERE
OS_VA1: 20/20-2
OD_VA1: 20/20-2
OS_VA1: 20/20-2
OD_CYLINDER: -1.00
OD_ADD: +2.50
OS_ADD: +2.50
OD_SPHERE: -1.25
OU_VA: 20/20-2
OS_CYLINDER: SPHERE
OD_AXIS: 090
OS_SPHERE: -1.00
OD_AXIS: 090

## 2024-10-21 ASSESSMENT — REFRACTION_CURRENTRX
OS_VPRISM_DIRECTION: PROGS
OD_SPHERE: -1.50
OS_CYLINDER: -0.50
OS_ADD: +2.25
OD_VPRISM_DIRECTION: PROGS
OD_OVR_VA: 20/
OD_CYLINDER: -0.50
OS_OVR_VA: 20/
OD_ADD: +2.25
OS_AXIS: 019
OS_SPHERE: -1.25
OD_AXIS: 084

## 2024-10-21 ASSESSMENT — REFRACTION_AUTOREFRACTION
OS_CYLINDER: -0.25
OD_CYLINDER: -0.75
OD_AXIS: 071
OS_SPHERE: -1.00
OS_AXIS: 052
OD_SPHERE: -1.00

## 2024-10-21 ASSESSMENT — TONOMETRY
OD_IOP_MMHG: 17
OS_IOP_MMHG: 14

## 2024-10-21 ASSESSMENT — SUPERFICIAL PUNCTATE KERATITIS (SPK)
OD_SPK: T
OS_SPK: T

## 2024-10-21 ASSESSMENT — VISUAL ACUITY
OS_BCVA: 20/70
OD_BCVA: 20/50

## 2024-10-21 ASSESSMENT — LID EXAM ASSESSMENTS
OS_BLEPHARITIS: LUL 1+ 2+
OD_BLEPHARITIS: RUL 1+ 2+

## 2025-01-07 ENCOUNTER — APPOINTMENT (OUTPATIENT)
Dept: HEMATOLOGY ONCOLOGY | Facility: CLINIC | Age: 75
End: 2025-01-07

## 2025-02-06 ENCOUNTER — APPOINTMENT (OUTPATIENT)
Dept: PODIATRY | Facility: CLINIC | Age: 75
End: 2025-02-06

## 2025-02-06 DIAGNOSIS — G57.90 UNSPECIFIED MONONEUROPATHY OF UNSPECIFIED LOWER LIMB: ICD-10-CM

## 2025-02-06 DIAGNOSIS — S93.491D SPRAIN OF OTHER LIGAMENT OF RIGHT ANKLE, SUBSEQUENT ENCOUNTER: ICD-10-CM

## 2025-02-06 DIAGNOSIS — S93.492A SPRAIN OF OTHER LIGAMENT OF LEFT ANKLE, INITIAL ENCOUNTER: ICD-10-CM

## 2025-02-06 DIAGNOSIS — S93.409A SPRAIN OF UNSPECIFIED LIGAMENT OF UNSPECIFIED ANKLE, INITIAL ENCOUNTER: ICD-10-CM

## 2025-02-06 PROCEDURE — 11721 DEBRIDE NAIL 6 OR MORE: CPT

## 2025-02-06 PROCEDURE — 99204 OFFICE O/P NEW MOD 45 MIN: CPT | Mod: 25

## 2025-02-06 PROCEDURE — 73630 X-RAY EXAM OF FOOT: CPT

## 2025-02-06 PROCEDURE — 29540 STRAPPING ANKLE &/FOOT: CPT | Mod: LT

## 2025-02-06 RX ORDER — L-METHYLFOLATE-ALGAE-VIT B12-B6 CAP 3-90.314-2-35 MG 3-90.314-2-35 MG
3-90.314-2-35 CAP ORAL
Qty: 90 | Refills: 3 | Status: ACTIVE | COMMUNITY
Start: 2025-02-06 | End: 1900-01-01

## 2025-02-06 RX ORDER — MELOXICAM 15 MG/1
15 TABLET ORAL
Qty: 14 | Refills: 1 | Status: ACTIVE | COMMUNITY
Start: 2025-02-06 | End: 1900-01-01

## 2025-02-18 ENCOUNTER — APPOINTMENT (OUTPATIENT)
Dept: PODIATRY | Facility: CLINIC | Age: 75
End: 2025-02-18
Payer: MEDICARE

## 2025-02-18 PROCEDURE — 29540 STRAPPING ANKLE &/FOOT: CPT | Mod: 59,LT

## 2025-02-18 PROCEDURE — 20605 DRAIN/INJ JOINT/BURSA W/O US: CPT | Mod: LT

## 2025-03-04 ENCOUNTER — APPOINTMENT (OUTPATIENT)
Dept: PODIATRY | Facility: CLINIC | Age: 75
End: 2025-03-04

## 2025-03-18 NOTE — DISCHARGE NOTE ADULT - PROCEDURE 3
Zoila Cosme RN  Outcome: Progressing      Bilateral salpingo-oophorectomy (BSO)

## 2025-04-05 ENCOUNTER — OFFICE (OUTPATIENT)
Dept: URBAN - METROPOLITAN AREA CLINIC 12 | Facility: CLINIC | Age: 75
Setting detail: OPHTHALMOLOGY
End: 2025-04-05
Payer: COMMERCIAL

## 2025-04-05 DIAGNOSIS — H25.13: ICD-10-CM

## 2025-04-05 DIAGNOSIS — H16.223: ICD-10-CM

## 2025-04-05 DIAGNOSIS — H43.813: ICD-10-CM

## 2025-04-05 DIAGNOSIS — H01.001: ICD-10-CM

## 2025-04-05 PROCEDURE — 92134 CPTRZ OPH DX IMG PST SGM RTA: CPT | Performed by: STUDENT IN AN ORGANIZED HEALTH CARE EDUCATION/TRAINING PROGRAM

## 2025-04-05 PROCEDURE — 99214 OFFICE O/P EST MOD 30 MIN: CPT | Performed by: STUDENT IN AN ORGANIZED HEALTH CARE EDUCATION/TRAINING PROGRAM

## 2025-04-05 ASSESSMENT — REFRACTION_MANIFEST
OS_CYLINDER: SPHERE
OD_ADD: +2.50
OU_VA: 20/20-2
OS_VA1: 20/20-2
OD_AXIS: 090
OD_SPHERE: -1.25
OD_SPHERE: -1.25
OD_VA1: 20/20-2
OS_SPHERE: -1.00
OD_CYLINDER: -1.00
OD_CYLINDER: -1.00
OU_VA: 20/20-2
OD_VA1: 20/20-2
OS_VA1: 20/20-2
OS_CYLINDER: SPHERE
OS_ADD: +2.50
OS_SPHERE: -1.00
OD_AXIS: 090

## 2025-04-05 ASSESSMENT — REFRACTION_CURRENTRX
OD_OVR_VA: 20/
OD_AXIS: 000
OD_VPRISM_DIRECTION: SV
OS_SPHERE: -1.50
OS_CYLINDER: -0.50
OS_AXIS: 009
OD_CYLINDER: 0.00
OS_OVR_VA: 20/
OD_SPHERE: -2.75
OS_VPRISM_DIRECTION: SV

## 2025-04-05 ASSESSMENT — REFRACTION_AUTOREFRACTION
OD_CYLINDER: -0.50
OS_AXIS: 036
OS_CYLINDER: -0.50
OD_SPHERE: -2.25
OD_AXIS: 086
OS_SPHERE: -0.75

## 2025-04-05 ASSESSMENT — KERATOMETRY
OS_AXISANGLE_DEGREES: 157
OS_K1POWER_DIOPTERS: 44.00
OD_AXISANGLE_DEGREES: 002
OD_K2POWER_DIOPTERS: 46.25
OS_K2POWER_DIOPTERS: 44.25
OD_K1POWER_DIOPTERS: 45.50

## 2025-04-05 ASSESSMENT — VISUAL ACUITY
OD_BCVA: 20/50+2
OS_BCVA: 20/40+2

## 2025-04-05 ASSESSMENT — CONFRONTATIONAL VISUAL FIELD TEST (CVF)
OD_FINDINGS: FULL
OS_FINDINGS: FULL

## 2025-04-05 ASSESSMENT — SUPERFICIAL PUNCTATE KERATITIS (SPK)
OS_SPK: T
OD_SPK: T

## 2025-04-05 ASSESSMENT — LID EXAM ASSESSMENTS
OD_BLEPHARITIS: RUL 1+ 2+
OS_BLEPHARITIS: LUL 1+ 2+

## 2025-04-05 ASSESSMENT — TONOMETRY: OD_IOP_MMHG: 11

## 2025-04-14 ENCOUNTER — RX ONLY (RX ONLY)
Age: 75
End: 2025-04-14

## 2025-04-14 ENCOUNTER — OFFICE (OUTPATIENT)
Dept: URBAN - METROPOLITAN AREA CLINIC 12 | Facility: CLINIC | Age: 75
Setting detail: OPHTHALMOLOGY
End: 2025-04-14
Payer: COMMERCIAL

## 2025-04-14 DIAGNOSIS — H25.13: ICD-10-CM

## 2025-04-14 DIAGNOSIS — H01.001: ICD-10-CM

## 2025-04-14 DIAGNOSIS — H01.004: ICD-10-CM

## 2025-04-14 DIAGNOSIS — H16.223: ICD-10-CM

## 2025-04-14 PROBLEM — H16.001 CORNEAL ULCER; RIGHT EYE: Status: RESOLVED | Noted: 2025-04-05 | Resolved: 2025-04-14

## 2025-04-14 PROCEDURE — 99213 OFFICE O/P EST LOW 20 MIN: CPT | Performed by: STUDENT IN AN ORGANIZED HEALTH CARE EDUCATION/TRAINING PROGRAM

## 2025-04-14 ASSESSMENT — KERATOMETRY
OS_AXISANGLE_DEGREES: 113
OS_K1POWER_DIOPTERS: 44.25
OS_K2POWER_DIOPTERS: 44.75
OD_K1POWER_DIOPTERS: 45.25
OD_K2POWER_DIOPTERS: 45.50
OD_AXISANGLE_DEGREES: 034

## 2025-04-14 ASSESSMENT — REFRACTION_MANIFEST
OD_VA1: 20/20-2
OD_AXIS: 090
OD_CYLINDER: -1.00
OD_VA1: 20/20-2
OD_SPHERE: -1.25
OS_CYLINDER: SPHERE
OD_AXIS: 090
OD_ADD: +2.50
OD_SPHERE: -1.25
OU_VA: 20/20-2
OS_VA1: 20/20-2
OS_VA1: 20/20-2
OS_ADD: +2.50
OS_SPHERE: -1.00
OS_SPHERE: -1.00
OD_CYLINDER: -1.00
OS_CYLINDER: SPHERE
OU_VA: 20/20-2

## 2025-04-14 ASSESSMENT — REFRACTION_CURRENTRX
OD_AXIS: 000
OD_CYLINDER: 0.00
OS_OVR_VA: 20/
OD_VPRISM_DIRECTION: SV
OD_SPHERE: -2.75
OS_AXIS: 009
OS_VPRISM_DIRECTION: SV
OS_SPHERE: -1.50
OS_CYLINDER: -0.50
OD_OVR_VA: 20/

## 2025-04-14 ASSESSMENT — CONFRONTATIONAL VISUAL FIELD TEST (CVF)
OS_FINDINGS: FULL
OD_FINDINGS: FULL

## 2025-04-14 ASSESSMENT — REFRACTION_AUTOREFRACTION
OD_AXIS: 077
OS_AXIS: 073
OS_SPHERE: -1.00
OD_SPHERE: -1.25
OS_CYLINDER: -0.75
OD_CYLINDER: -1.00

## 2025-04-14 ASSESSMENT — VISUAL ACUITY
OD_BCVA: 20/40-2
OS_BCVA: 20/30-2

## 2025-04-14 ASSESSMENT — SUPERFICIAL PUNCTATE KERATITIS (SPK)
OS_SPK: T
OD_SPK: T

## 2025-04-14 ASSESSMENT — TONOMETRY
OS_IOP_MMHG: 12
OD_IOP_MMHG: 11

## 2025-04-14 ASSESSMENT — LID EXAM ASSESSMENTS
OS_BLEPHARITIS: LUL 1+ 2+
OD_BLEPHARITIS: RUL 1+ 2+

## 2025-04-15 ENCOUNTER — APPOINTMENT (OUTPATIENT)
Dept: PODIATRY | Facility: CLINIC | Age: 75
End: 2025-04-15

## 2025-06-09 ENCOUNTER — RX ONLY (RX ONLY)
Age: 75
End: 2025-06-09

## 2025-06-09 ENCOUNTER — OFFICE (OUTPATIENT)
Dept: URBAN - METROPOLITAN AREA CLINIC 12 | Facility: CLINIC | Age: 75
Setting detail: OPHTHALMOLOGY
End: 2025-06-09
Payer: COMMERCIAL

## 2025-06-09 DIAGNOSIS — H25.11: ICD-10-CM

## 2025-06-09 DIAGNOSIS — H25.13: ICD-10-CM

## 2025-06-09 PROCEDURE — 99213 OFFICE O/P EST LOW 20 MIN: CPT | Performed by: STUDENT IN AN ORGANIZED HEALTH CARE EDUCATION/TRAINING PROGRAM

## 2025-06-09 PROCEDURE — 92136 OPHTHALMIC BIOMETRY: CPT | Mod: RT | Performed by: STUDENT IN AN ORGANIZED HEALTH CARE EDUCATION/TRAINING PROGRAM

## 2025-06-09 ASSESSMENT — KERATOMETRY
METHOD_AUTO_MANUAL: AUTO
OD_K1POWER_DIOPTERS: 44.75
OS_K1POWER_DIOPTERS: 44.00
OS_K2POWER_DIOPTERS: 44.25
OD_K2POWER_DIOPTERS: 45.75
OS_AXISANGLE_DEGREES: 146
OD_AXISANGLE_DEGREES: 088

## 2025-06-09 ASSESSMENT — REFRACTION_CURRENTRX
OS_OVR_VA: 20/
OS_VPRISM_DIRECTION: SV
OD_SPHERE: -2.75
OD_CYLINDER: 0.00
OS_SPHERE: -1.50
OD_VPRISM_DIRECTION: SV
OS_AXIS: 009
OD_AXIS: 000
OD_OVR_VA: 20/
OS_CYLINDER: -0.50

## 2025-06-09 ASSESSMENT — CONFRONTATIONAL VISUAL FIELD TEST (CVF)
OD_FINDINGS: FULL
OS_FINDINGS: FULL

## 2025-06-09 ASSESSMENT — REFRACTION_MANIFEST
OS_SPHERE: -1.00
OD_ADD: +2.50
OD_CYLINDER: -1.00
OS_CYLINDER: SPHERE
OU_VA: 20/20-2
OD_CYLINDER: -1.00
OU_VA: 20/20-2
OS_ADD: +2.50
OS_SPHERE: -1.00
OS_CYLINDER: SPHERE
OS_VA1: 20/20-2
OS_VA1: 20/20-2
OD_VA1: 20/20-2
OD_AXIS: 090
OD_VA1: 20/20-2
OD_AXIS: 090
OD_SPHERE: -1.25
OD_SPHERE: -1.25

## 2025-06-09 ASSESSMENT — LID EXAM ASSESSMENTS
OS_BLEPHARITIS: LUL 1+ 2+
OD_BLEPHARITIS: RUL 1+ 2+

## 2025-06-09 ASSESSMENT — REFRACTION_AUTOREFRACTION
OS_SPHERE: -1.25
OS_AXIS: 078
OD_AXIS: 068
OD_CYLINDER: -0.50
OD_SPHERE: -1.75
OS_CYLINDER: -0.25

## 2025-06-09 ASSESSMENT — SUPERFICIAL PUNCTATE KERATITIS (SPK)
OS_SPK: T
OD_SPK: T

## 2025-06-09 ASSESSMENT — VISUAL ACUITY
OD_BCVA: 20/50-2
OS_BCVA: 20/30-2

## 2025-06-24 ENCOUNTER — ASC (OUTPATIENT)
Dept: URBAN - METROPOLITAN AREA SURGERY 8 | Facility: SURGERY | Age: 75
Setting detail: OPHTHALMOLOGY
End: 2025-06-24
Payer: COMMERCIAL

## 2025-06-24 DIAGNOSIS — H25.12: ICD-10-CM

## 2025-06-24 PROCEDURE — 66984 XCAPSL CTRC RMVL W/O ECP: CPT | Mod: LT | Performed by: STUDENT IN AN ORGANIZED HEALTH CARE EDUCATION/TRAINING PROGRAM

## 2025-06-25 ENCOUNTER — OFFICE (OUTPATIENT)
Dept: URBAN - METROPOLITAN AREA CLINIC 12 | Facility: CLINIC | Age: 75
Setting detail: OPHTHALMOLOGY
End: 2025-06-25
Payer: COMMERCIAL

## 2025-06-25 ENCOUNTER — RX ONLY (RX ONLY)
Age: 75
End: 2025-06-25

## 2025-06-25 DIAGNOSIS — H25.11: ICD-10-CM

## 2025-06-25 PROCEDURE — 92136 OPHTHALMIC BIOMETRY: CPT | Mod: RT | Performed by: STUDENT IN AN ORGANIZED HEALTH CARE EDUCATION/TRAINING PROGRAM

## 2025-06-25 ASSESSMENT — REFRACTION_CURRENTRX
OS_CYLINDER: -0.50
OD_OVR_VA: 20/
OS_VPRISM_DIRECTION: SV
OS_AXIS: 009
OD_CYLINDER: 0.00
OS_SPHERE: -1.50
OS_OVR_VA: 20/
OD_SPHERE: -2.75
OD_VPRISM_DIRECTION: SV
OD_AXIS: 000

## 2025-06-25 ASSESSMENT — KERATOMETRY
OD_AXISANGLE_DEGREES: 073
OS_AXISANGLE_DEGREES: 093
OS_K2POWER_DIOPTERS: 46.00
OD_K1POWER_DIOPTERS: 45.25
OD_K2POWER_DIOPTERS: 45.50
OS_K1POWER_DIOPTERS: 44.00
METHOD_AUTO_MANUAL: AUTO

## 2025-06-25 ASSESSMENT — REFRACTION_MANIFEST
OS_SPHERE: -1.00
OS_ADD: +2.50
OD_VA1: 20/20-2
OS_VA1: 20/20-2
OS_CYLINDER: SPHERE
OS_SPHERE: -1.00
OD_SPHERE: -1.25
OS_VA1: 20/20-2
OU_VA: 20/20-2
OD_AXIS: 090
OS_CYLINDER: SPHERE
OD_SPHERE: -1.25
OD_CYLINDER: -1.00
OD_VA1: 20/20-2
OD_CYLINDER: -1.00
OD_ADD: +2.50
OU_VA: 20/20-2
OD_AXIS: 090

## 2025-06-25 ASSESSMENT — REFRACTION_AUTOREFRACTION
OS_SPHERE: -3.75
OD_SPHERE: -1.50
OS_AXIS: 008
OD_CYLINDER: -0.75
OS_CYLINDER: -0.75
OD_AXIS: 070

## 2025-06-25 ASSESSMENT — CONFRONTATIONAL VISUAL FIELD TEST (CVF)
OS_FINDINGS: FULL
OD_FINDINGS: FULL

## 2025-06-25 ASSESSMENT — SUPERFICIAL PUNCTATE KERATITIS (SPK)
OD_SPK: T
OS_SPK: T

## 2025-06-25 ASSESSMENT — VISUAL ACUITY
OD_BCVA: 20/400
OS_BCVA: 20/40-2

## 2025-06-25 ASSESSMENT — LID EXAM ASSESSMENTS
OD_BLEPHARITIS: RUL 1+ 2+
OS_BLEPHARITIS: LUL 1+ 2+

## 2025-06-25 ASSESSMENT — TONOMETRY
OD_IOP_MMHG: 11
OS_IOP_MMHG: 12

## 2025-06-30 ENCOUNTER — OFFICE (OUTPATIENT)
Dept: URBAN - METROPOLITAN AREA CLINIC 12 | Facility: CLINIC | Age: 75
Setting detail: OPHTHALMOLOGY
End: 2025-06-30
Payer: COMMERCIAL

## 2025-06-30 DIAGNOSIS — Z96.1: ICD-10-CM

## 2025-06-30 DIAGNOSIS — H43.813: ICD-10-CM

## 2025-06-30 PROBLEM — H25.11 CATARACT NUCLEAR SCLEROSIS AGE RELATED; RIGHT EYE: Status: ACTIVE | Noted: 2025-06-25

## 2025-06-30 PROCEDURE — 99024 POSTOP FOLLOW-UP VISIT: CPT | Performed by: STUDENT IN AN ORGANIZED HEALTH CARE EDUCATION/TRAINING PROGRAM

## 2025-06-30 ASSESSMENT — REFRACTION_MANIFEST
OU_VA: 20/20-2
OS_CYLINDER: SPHERE
OD_ADD: +2.50
OS_CYLINDER: -0.75
OS_ADD: +2.50
OD_VA1: 20/20-2
OD_AXIS: 090
OD_CYLINDER: -1.00
OU_VA: 20/20-2
OD_SPHERE: -1.25
OS_VA1: 20/20-2
OS_SPHERE: -1.00
OD_VA1: 20/20-2
OD_CYLINDER: -1.00
OS_SPHERE: -3.00
OD_SPHERE: -1.25
OD_AXIS: 090
OS_AXIS: 020
OS_VA1: 20/30+1

## 2025-06-30 ASSESSMENT — REFRACTION_CURRENTRX
OD_CYLINDER: 0.00
OD_VPRISM_DIRECTION: SV
OS_OVR_VA: 20/
OD_SPHERE: -2.75
OS_SPHERE: -1.50
OD_AXIS: 000
OD_OVR_VA: 20/
OS_VPRISM_DIRECTION: SV
OS_AXIS: 009
OS_CYLINDER: -0.50

## 2025-06-30 ASSESSMENT — TONOMETRY: OS_IOP_MMHG: 12

## 2025-06-30 ASSESSMENT — VISUAL ACUITY
OS_BCVA: 20/40-
OD_BCVA: 20/150

## 2025-06-30 ASSESSMENT — CONFRONTATIONAL VISUAL FIELD TEST (CVF)
OD_FINDINGS: FULL
OS_FINDINGS: FULL

## 2025-07-09 ENCOUNTER — OFFICE (OUTPATIENT)
Dept: URBAN - METROPOLITAN AREA CLINIC 12 | Facility: CLINIC | Age: 75
Setting detail: OPHTHALMOLOGY
End: 2025-07-09
Payer: COMMERCIAL

## 2025-07-09 DIAGNOSIS — Z96.1: ICD-10-CM

## 2025-07-09 PROCEDURE — 99024 POSTOP FOLLOW-UP VISIT: CPT | Performed by: STUDENT IN AN ORGANIZED HEALTH CARE EDUCATION/TRAINING PROGRAM

## 2025-07-09 ASSESSMENT — REFRACTION_MANIFEST
OS_CYLINDER: SPHERE
OS_VA1: 20/20-2
OD_VA1: 20/20-2
OS_SPHERE: -1.00
OS_VA1: 20/25-
OU_VA: 20/20-2
OS_CYLINDER: -1.25
OS_SPHERE: -3.00
OS_AXIS: 010
OD_AXIS: 090
OD_AXIS: 090
OD_ADD: +2.50
OD_SPHERE: -1.25
OD_VA1: 20/20-2
OS_ADD: +2.50
OD_SPHERE: -1.25
OD_CYLINDER: -1.00
OD_CYLINDER: -1.00
OU_VA: 20/20-2

## 2025-07-09 ASSESSMENT — REFRACTION_CURRENTRX
OD_CYLINDER: 0.00
OS_VPRISM_DIRECTION: SV
OD_OVR_VA: 20/
OD_AXIS: 000
OS_AXIS: 009
OD_SPHERE: -2.75
OS_SPHERE: -1.50
OS_CYLINDER: -0.50
OS_OVR_VA: 20/
OD_VPRISM_DIRECTION: SV

## 2025-07-09 ASSESSMENT — KERATOMETRY
OD_K1POWER_DIOPTERS: 45.25
OS_K1POWER_DIOPTERS: 44.25
METHOD_AUTO_MANUAL: AUTO
OS_AXISANGLE_DEGREES: 089
OD_K2POWER_DIOPTERS: 45.75
OS_K2POWER_DIOPTERS: 45.25
OD_AXISANGLE_DEGREES: 018

## 2025-07-09 ASSESSMENT — VISUAL ACUITY
OD_BCVA: 20/125
OS_BCVA: 20/70

## 2025-07-09 ASSESSMENT — LID EXAM ASSESSMENTS
OD_BLEPHARITIS: RUL 1+ 2+
OS_BLEPHARITIS: LUL 1+ 2+

## 2025-07-09 ASSESSMENT — REFRACTION_AUTOREFRACTION
OD_SPHERE: -0.50
OS_SPHERE: -3.00
OD_CYLINDER: -1.75
OD_AXIS: 094
OS_AXIS: 008
OS_CYLINDER: -1.25

## 2025-07-09 ASSESSMENT — TONOMETRY
OD_IOP_MMHG: 11
OS_IOP_MMHG: 11

## 2025-07-09 ASSESSMENT — CONFRONTATIONAL VISUAL FIELD TEST (CVF)
OD_FINDINGS: FULL
OS_FINDINGS: FULL

## 2025-07-09 ASSESSMENT — SUPERFICIAL PUNCTATE KERATITIS (SPK)
OS_SPK: T
OD_SPK: T

## 2025-07-15 ENCOUNTER — APPOINTMENT (OUTPATIENT)
Dept: PODIATRY | Facility: CLINIC | Age: 75
End: 2025-07-15
Payer: MEDICARE

## 2025-07-15 PROCEDURE — 99213 OFFICE O/P EST LOW 20 MIN: CPT | Mod: 25

## 2025-07-15 PROCEDURE — 29540 STRAPPING ANKLE &/FOOT: CPT | Mod: 59,LT

## 2025-07-15 PROCEDURE — 20600 DRAIN/INJ JOINT/BURSA W/O US: CPT

## 2025-07-31 ENCOUNTER — APPOINTMENT (OUTPATIENT)
Dept: PODIATRY | Facility: CLINIC | Age: 75
End: 2025-07-31

## 2025-08-18 ENCOUNTER — OFFICE (OUTPATIENT)
Dept: URBAN - METROPOLITAN AREA CLINIC 12 | Facility: CLINIC | Age: 75
Setting detail: OPHTHALMOLOGY
End: 2025-08-18
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
## 2025-08-18 PROCEDURE — NO SHOW FE NO SHOW FEE: Performed by: STUDENT IN AN ORGANIZED HEALTH CARE EDUCATION/TRAINING PROGRAM

## 2025-08-19 ENCOUNTER — APPOINTMENT (OUTPATIENT)
Dept: PODIATRY | Facility: CLINIC | Age: 75
End: 2025-08-19

## 2025-08-19 PROCEDURE — 99213 OFFICE O/P EST LOW 20 MIN: CPT | Mod: 25

## 2025-08-19 PROCEDURE — 64455 NJX AA&/STRD PLTR COM DG NRV: CPT | Mod: RT

## 2025-08-19 PROCEDURE — 11721 DEBRIDE NAIL 6 OR MORE: CPT

## 2025-08-28 ENCOUNTER — APPOINTMENT (OUTPATIENT)
Dept: PODIATRY | Facility: CLINIC | Age: 75
End: 2025-08-28

## 2025-08-28 PROCEDURE — 64450 NJX AA&/STRD OTHER PN/BRANCH: CPT | Mod: LT

## 2025-09-09 ENCOUNTER — APPOINTMENT (OUTPATIENT)
Dept: PODIATRY | Facility: CLINIC | Age: 75
End: 2025-09-09

## 2025-09-09 PROCEDURE — 64450 NJX AA&/STRD OTHER PN/BRANCH: CPT | Mod: RT

## 2025-09-18 ENCOUNTER — APPOINTMENT (OUTPATIENT)
Dept: PODIATRY | Facility: CLINIC | Age: 75
End: 2025-09-18